# Patient Record
Sex: FEMALE | Race: BLACK OR AFRICAN AMERICAN | NOT HISPANIC OR LATINO | Employment: FULL TIME | URBAN - METROPOLITAN AREA
[De-identification: names, ages, dates, MRNs, and addresses within clinical notes are randomized per-mention and may not be internally consistent; named-entity substitution may affect disease eponyms.]

---

## 2018-03-15 ENCOUNTER — OFFICE VISIT (OUTPATIENT)
Dept: UROLOGY | Facility: CLINIC | Age: 38
End: 2018-03-15
Payer: COMMERCIAL

## 2018-03-15 VITALS
WEIGHT: 210 LBS | DIASTOLIC BLOOD PRESSURE: 76 MMHG | BODY MASS INDEX: 31.83 KG/M2 | SYSTOLIC BLOOD PRESSURE: 117 MMHG | HEART RATE: 69 BPM | HEIGHT: 68 IN

## 2018-03-15 DIAGNOSIS — N39.0 URINARY TRACT INFECTION WITHOUT HEMATURIA, SITE UNSPECIFIED: Primary | ICD-10-CM

## 2018-03-15 LAB
BACTERIA #/AREA URNS AUTO: ABNORMAL /HPF
BILIRUB UR QL STRIP: NEGATIVE
CLARITY UR REFRACT.AUTO: ABNORMAL
COLOR UR AUTO: YELLOW
GLUCOSE UR QL STRIP: NEGATIVE
HGB UR QL STRIP: ABNORMAL
KETONES UR QL STRIP: NEGATIVE
LEUKOCYTE ESTERASE UR QL STRIP: ABNORMAL
MICROSCOPIC COMMENT: ABNORMAL
NITRITE UR QL STRIP: POSITIVE
PH UR STRIP: 5 [PH] (ref 5–8)
PROT UR QL STRIP: NEGATIVE
RBC #/AREA URNS AUTO: 8 /HPF (ref 0–4)
SP GR UR STRIP: 1.02 (ref 1–1.03)
SQUAMOUS #/AREA URNS AUTO: 7 /HPF
URN SPEC COLLECT METH UR: ABNORMAL
UROBILINOGEN UR STRIP-ACNC: NEGATIVE EU/DL
WBC #/AREA URNS AUTO: 25 /HPF (ref 0–5)

## 2018-03-15 PROCEDURE — 81001 URINALYSIS AUTO W/SCOPE: CPT

## 2018-03-15 PROCEDURE — 99999 PR PBB SHADOW E&M-EST. PATIENT-LVL III: CPT | Mod: PBBFAC,,, | Performed by: STUDENT IN AN ORGANIZED HEALTH CARE EDUCATION/TRAINING PROGRAM

## 2018-03-15 PROCEDURE — 87088 URINE BACTERIA CULTURE: CPT

## 2018-03-15 PROCEDURE — 87186 SC STD MICRODIL/AGAR DIL: CPT

## 2018-03-15 PROCEDURE — 87086 URINE CULTURE/COLONY COUNT: CPT

## 2018-03-15 PROCEDURE — 99204 OFFICE O/P NEW MOD 45 MIN: CPT | Mod: S$GLB,,, | Performed by: STUDENT IN AN ORGANIZED HEALTH CARE EDUCATION/TRAINING PROGRAM

## 2018-03-15 PROCEDURE — 87077 CULTURE AEROBIC IDENTIFY: CPT

## 2018-03-15 RX ORDER — NITROFURANTOIN 25; 75 MG/1; MG/1
100 CAPSULE ORAL 2 TIMES DAILY
Qty: 14 CAPSULE | Refills: 0 | Status: SHIPPED | OUTPATIENT
Start: 2018-03-15 | End: 2018-03-22

## 2018-03-15 NOTE — PROGRESS NOTES
"Subjective:       Patient ID: Desire Kilpatrick is a 37 y.o. female.    Chief Complaint: Other (Urine has strong odor.)  This is a 37 y.o.  female patient that is new to me.  The patient is self referred to me for a UTI. She has UTIs about 2x/year since she was about 16 year old per her history. She stated she was given antibiotics twice once in 3/2017 and 8/2017 with her ob/gyn and her urgent care. She is again developing symptoms that make her believe she has another UTI. She is feeling urgency with false alarms, she is going to the bathroom and having small voided volumes only despite strong a urge, she feels like the odor of her urine is "strong". She feels discomfort in her vaginal area and does feel discomfort during sexual activity currently. Denies suprapubic pain, flank pain bilaterally.  She is  to her  for 10 years. She had 3 children, 16, 9, and 12 years old.   Works as an [employer] .      No results found for: CREATININE    ---  Past Medical History:   Diagnosis Date    Migraine headache     STD (sexually transmitted disease)     Urinary tract infection        Past Surgical History:   Procedure Laterality Date    Breast Reduction      TUBAL LIGATION      tummy tuck         No family history on file.    Social History   Substance Use Topics    Smoking status: Never Smoker    Smokeless tobacco: Not on file    Alcohol use No       Current Outpatient Prescriptions on File Prior to Visit   Medication Sig Dispense Refill    [DISCONTINUED] propranolol (INDERAL) 10 MG tablet Take 10 mg by mouth 2 (two) times daily.       No current facility-administered medications on file prior to visit.        Review of patient's allergies indicates:  No Known Allergies    Review of Systems   Constitutional: Negative for activity change.   HENT: Negative for congestion.    Eyes: Negative for visual disturbance.   Respiratory: Negative for shortness of breath.    Cardiovascular: Negative for chest pain. "   Gastrointestinal: Negative for abdominal distention.   Musculoskeletal: Negative for gait problem.   Skin: Negative for color change.   Neurological: Negative for dizziness.   Psychiatric/Behavioral: Negative for agitation.       Objective:      Physical Exam   Constitutional: She is oriented to person, place, and time. She appears well-developed.   HENT:   Head: Normocephalic and atraumatic.   Eyes: EOM are normal.   Neck: Normal range of motion.   Cardiovascular: Intact distal pulses.    Pulmonary/Chest: Effort normal.   Abdominal: Soft. She exhibits no distension. There is no tenderness (no flank pain bilaterally).   Musculoskeletal: Normal range of motion.   Neurological: She is alert and oriented to person, place, and time.   Skin: Skin is warm and dry.   Psychiatric: She has a normal mood and affect.       Assessment:       1. Urinary tract infection without hematuria, site unspecified        Plan:       1. For her UTI will preemptively treat with Macrobid x 7 days while we wait for culture results.  2. Will order urine culture and urinalysis.  3. Since she has been having UTIs twice a year since she was 16, I believe an ultrasound of the kidneys is reasonable to rule out a nidus of infection. I will call her with these results.  4. In the future if she develops symptoms concerning for a UTI, she can call our office and I can order a urine culture and call in antibiotics for her.    Urinary tract infection without hematuria, site unspecified  -     Urine culture  -     Urinalysis  -     Urinalysis Microscopic  -     US Retroperitoneal Complete (Kidney and; Future; Expected date: 03/15/2018    Other orders  -     nitrofurantoin, macrocrystal-monohydrate, (MACROBID) 100 MG capsule; Take 1 capsule (100 mg total) by mouth 2 (two) times daily.  Dispense: 14 capsule; Refill: 0

## 2018-03-17 LAB — BACTERIA UR CULT: NORMAL

## 2018-03-19 ENCOUNTER — TELEPHONE (OUTPATIENT)
Dept: UROLOGY | Facility: CLINIC | Age: 38
End: 2018-03-19

## 2018-03-19 NOTE — TELEPHONE ENCOUNTER
----- Message from Ivory Pinto MD sent at 3/19/2018  7:53 AM CDT -----  Please call patient and notify of positive culture and antibiotics. The urine culture grew out bacteria concerning for a urinary tract infection. The bacteria is sensitive to the Macrobid that I prescribed to her when she came to clinic. She should finish these antibiotics.

## 2018-08-22 ENCOUNTER — ANESTHESIA EVENT (OUTPATIENT)
Dept: SURGERY | Facility: OTHER | Age: 38
End: 2018-08-22
Payer: COMMERCIAL

## 2018-08-22 ENCOUNTER — HOSPITAL ENCOUNTER (OUTPATIENT)
Dept: PREADMISSION TESTING | Facility: OTHER | Age: 38
Discharge: HOME | End: 2018-08-22
Attending: ORTHOPAEDIC SURGERY
Payer: COMMERCIAL

## 2018-08-22 VITALS
HEART RATE: 77 BPM | WEIGHT: 210 LBS | SYSTOLIC BLOOD PRESSURE: 138 MMHG | HEIGHT: 69 IN | OXYGEN SATURATION: 98 % | BODY MASS INDEX: 31.1 KG/M2 | DIASTOLIC BLOOD PRESSURE: 82 MMHG | TEMPERATURE: 98 F

## 2018-08-22 DIAGNOSIS — M22.42 CHONDROMALACIA OF LEFT PATELLA: Primary | ICD-10-CM

## 2018-08-22 RX ORDER — PREGABALIN 75 MG/1
150 CAPSULE ORAL ONCE
Status: DISCONTINUED | OUTPATIENT
Start: 2018-08-22 | End: 2018-08-23 | Stop reason: HOSPADM

## 2018-08-22 RX ORDER — SCOLOPAMINE TRANSDERMAL SYSTEM 1 MG/1
1 PATCH, EXTENDED RELEASE TRANSDERMAL
Status: CANCELLED | OUTPATIENT
Start: 2018-08-22

## 2018-08-22 RX ORDER — ONDANSETRON 2 MG/ML
4 INJECTION INTRAMUSCULAR; INTRAVENOUS DAILY PRN
Status: CANCELLED | OUTPATIENT
Start: 2018-08-22

## 2018-08-22 RX ORDER — MEPERIDINE HYDROCHLORIDE 50 MG/ML
12.5 INJECTION INTRAMUSCULAR; INTRAVENOUS; SUBCUTANEOUS ONCE AS NEEDED
Status: CANCELLED | OUTPATIENT
Start: 2018-08-22 | End: 2018-08-22

## 2018-08-22 RX ORDER — OXYCODONE HYDROCHLORIDE 5 MG/1
5 TABLET ORAL
Status: CANCELLED | OUTPATIENT
Start: 2018-08-22

## 2018-08-22 RX ORDER — SODIUM CHLORIDE 0.9 % (FLUSH) 0.9 %
3 SYRINGE (ML) INJECTION
Status: DISCONTINUED | OUTPATIENT
Start: 2018-08-22 | End: 2018-08-23 | Stop reason: HOSPADM

## 2018-08-22 RX ORDER — FENTANYL CITRATE 50 UG/ML
25 INJECTION, SOLUTION INTRAMUSCULAR; INTRAVENOUS EVERY 5 MIN PRN
Status: CANCELLED | OUTPATIENT
Start: 2018-08-22

## 2018-08-22 RX ORDER — HYDROMORPHONE HYDROCHLORIDE 2 MG/ML
0.4 INJECTION, SOLUTION INTRAMUSCULAR; INTRAVENOUS; SUBCUTANEOUS EVERY 5 MIN PRN
Status: CANCELLED | OUTPATIENT
Start: 2018-08-22

## 2018-08-22 RX ORDER — SODIUM CHLORIDE, SODIUM LACTATE, POTASSIUM CHLORIDE, CALCIUM CHLORIDE 600; 310; 30; 20 MG/100ML; MG/100ML; MG/100ML; MG/100ML
INJECTION, SOLUTION INTRAVENOUS CONTINUOUS
Status: CANCELLED | OUTPATIENT
Start: 2018-08-22

## 2018-08-22 NOTE — ANESTHESIA PREPROCEDURE EVALUATION
08/22/2018  Desire Kilpatrick is a 38 y.o., female.    Anesthesia Evaluation    I have reviewed the Patient Summary Reports.    I have reviewed the Nursing Notes.   I have reviewed the Medications.     Review of Systems  Anesthesia Hx:  No problems with previous Anesthesia  Denies Family Hx of Anesthesia complications.  Personal Hx of Anesthesia complications, Post-Operative Nausea/Vomiting   Social:  Non-Smoker    Hematology/Oncology:  Hematology Normal   Oncology Normal     EENT/Dental:EENT/Dental Normal   Cardiovascular:  Cardiovascular Normal Exercise tolerance: good     Pulmonary:  Pulmonary Normal    Renal/:  Renal/ Normal     Musculoskeletal:  Musculoskeletal Normal    Neurological:   Headaches    Endocrine:  Endocrine Normal    Dermatological:  Skin Normal    Psych:  Psychiatric Normal           Physical Exam  General:  Well nourished    Airway/Jaw/Neck:  Airway Findings: Mouth Opening: Normal Tongue: Normal  General Airway Assessment: Adult  Mallampati: I  TM Distance: Normal, at least 6 cm  Jaw/Neck Findings:  Neck ROM: Normal ROM      Dental:  Dental Findings: In tact             Anesthesia Plan  Type of Anesthesia, risks & benefits discussed:  Anesthesia Type:  general  Patient's Preference:   Intra-op Monitoring Plan:   Intra-op Monitoring Plan Comments:   Post Op Pain Control Plan: per primary service following discharge from PACU  Post Op Pain Control Plan Comments:   Induction:   IV  Beta Blocker:         Informed Consent: Patient understands risks and agrees with Anesthesia plan.  Questions answered.   ASA Score: 2     Day of Surgery Review of History & Physical:    H&P update referred to the surgeon.         Ready For Surgery From Anesthesia Perspective.

## 2018-08-22 NOTE — DISCHARGE INSTRUCTIONS
PRE-ADMIT TESTING -  [phone]    [address]  [place] [facility]          Your surgery has been scheduled at [facility]. We are pleased to have the opportunity to serve you. For Further Information please call [phone].    On the day of surgery please report to the Information Desk on the 1st floor.    · CONTACT YOUR PHYSICIAN'S OFFICE THE DAY PRIOR TO YOUR SURGERY TO OBTAIN YOUR ARRIVAL TIME.     · The evening before surgery do not eat anything after 9 p.m. ( this includes hard candy, chewing gum and mints).  You may only have GATORADE, POWERADE AND WATER  from 9 p.m. until you leave your home.   DO NOT DRINK ANY LIQUIDS ON THE WAY TO THE HOSPITAL.      SPECIAL MEDICATION INSTRUCTIONS: TAKE medications checked off by the Anesthesiologist on your Medication List.    Angiogram Patients: Take medications as instructed by your physician, including aspirin.     Surgery Patients:    If you take ASPIRIN - Your PHYSICIAN/SURGEON will need to inform you IF/OR when you need to stop taking aspirin prior to your surgery.     Do Not take any medications containing IBUPROFEN.  Do Not Wear any make-up or dark nail polish   (especially eye make-up) to surgery. If you come to surgery with makeup on you will be required to remove the makeup or nail polish.    Do not shave your surgical area at least 5 days prior to your surgery. The surgical prep will be performed at the hospital according to Infection Control regulations.    Leave all valuables at home.   Do Not wear any jewelry or watches, including any metal in body piercings.  Contact Lens must be removed before surgery. Either do not wear the contact lens or bring a case and solution for storage.  Please bring a container for eyeglasses or dentures as required.  Bring any paperwork your physician has provided, such as consent forms,  history and physicals, doctor's orders, etc.   Bring comfortable clothes that are loose fitting to wear upon  discharge. Take into consideration the type of surgery being performed.  Maintain your diet as advised per your physician the day prior to surgery.      Adequate rest the night before surgery is advised.   Park in the Parking lot behind the hospital or in the [facility] across the street from the parking lot. Parking is complimentary.  If you will be discharged the same day as your procedure, please arrange for a responsible adult to drive you home or to accompany you if traveling by taxi.   YOU WILL NOT BE PERMITTED TO DRIVE OR TO LEAVE THE HOSPITAL ALONE AFTER SURGERY.   It is strongly recommended that you arrange for someone to remain with you for the first 24 hrs following your surgery.       Thank you for your cooperation.  The Staff of [facility].        Bathing Instructions                                                                 Please shower the evening before and morning of your procedure with    ANTIBACTERIAL SOAP. ( DIAL, etc )  Concentrate on the surgical area   for at least 3 minutes and rinse completely. Dry off as usual.   Do not use any deodorant, powder, body lotions, perfume, after shave or    cologne.

## 2018-08-24 ENCOUNTER — HOSPITAL ENCOUNTER (OUTPATIENT)
Facility: OTHER | Age: 38
Discharge: HOME | End: 2018-08-24
Attending: ORTHOPAEDIC SURGERY | Admitting: ORTHOPAEDIC SURGERY
Payer: COMMERCIAL

## 2018-08-24 ENCOUNTER — ANESTHESIA (OUTPATIENT)
Dept: SURGERY | Facility: OTHER | Age: 38
End: 2018-08-24
Payer: COMMERCIAL

## 2018-08-24 VITALS
SYSTOLIC BLOOD PRESSURE: 136 MMHG | OXYGEN SATURATION: 99 % | WEIGHT: 210 LBS | HEART RATE: 94 BPM | RESPIRATION RATE: 16 BRPM | HEIGHT: 69 IN | TEMPERATURE: 98 F | BODY MASS INDEX: 31.1 KG/M2 | DIASTOLIC BLOOD PRESSURE: 82 MMHG

## 2018-08-24 DIAGNOSIS — M22.42 CHONDROMALACIA OF LEFT PATELLA: ICD-10-CM

## 2018-08-24 LAB
B-HCG UR QL: NEGATIVE
CTP QC/QA: YES

## 2018-08-24 PROCEDURE — C1729 CATH, DRAINAGE: HCPCS | Performed by: ORTHOPAEDIC SURGERY

## 2018-08-24 PROCEDURE — 25000003 PHARM REV CODE 250: Performed by: ANESTHESIOLOGY

## 2018-08-24 PROCEDURE — 71000015 HC POSTOP RECOV 1ST HR: Performed by: ORTHOPAEDIC SURGERY

## 2018-08-24 PROCEDURE — 63600175 PHARM REV CODE 636 W HCPCS: Performed by: NURSE ANESTHETIST, CERTIFIED REGISTERED

## 2018-08-24 PROCEDURE — 37000009 HC ANESTHESIA EA ADD 15 MINS: Performed by: ORTHOPAEDIC SURGERY

## 2018-08-24 PROCEDURE — 27201423 OPTIME MED/SURG SUP & DEVICES STERILE SUPPLY: Performed by: ORTHOPAEDIC SURGERY

## 2018-08-24 PROCEDURE — 71000016 HC POSTOP RECOV ADDL HR: Performed by: ORTHOPAEDIC SURGERY

## 2018-08-24 PROCEDURE — 36000710: Performed by: ORTHOPAEDIC SURGERY

## 2018-08-24 PROCEDURE — 25000003 PHARM REV CODE 250: Performed by: NURSE ANESTHETIST, CERTIFIED REGISTERED

## 2018-08-24 PROCEDURE — 63600175 PHARM REV CODE 636 W HCPCS: Performed by: ORTHOPAEDIC SURGERY

## 2018-08-24 PROCEDURE — 25000003 PHARM REV CODE 250: Performed by: ORTHOPAEDIC SURGERY

## 2018-08-24 PROCEDURE — 81025 URINE PREGNANCY TEST: CPT | Performed by: ANESTHESIOLOGY

## 2018-08-24 PROCEDURE — 25000003 PHARM REV CODE 250: Performed by: SPECIALIST

## 2018-08-24 PROCEDURE — 37000008 HC ANESTHESIA 1ST 15 MINUTES: Performed by: ORTHOPAEDIC SURGERY

## 2018-08-24 PROCEDURE — 36000711: Performed by: ORTHOPAEDIC SURGERY

## 2018-08-24 PROCEDURE — 63600175 PHARM REV CODE 636 W HCPCS: Performed by: SPECIALIST

## 2018-08-24 PROCEDURE — 71000033 HC RECOVERY, INTIAL HOUR: Performed by: ORTHOPAEDIC SURGERY

## 2018-08-24 RX ORDER — MEPERIDINE HYDROCHLORIDE 50 MG/ML
12.5 INJECTION INTRAMUSCULAR; INTRAVENOUS; SUBCUTANEOUS ONCE AS NEEDED
Status: COMPLETED | OUTPATIENT
Start: 2018-08-24 | End: 2018-08-24

## 2018-08-24 RX ORDER — ONDANSETRON 4 MG/1
8 TABLET, ORALLY DISINTEGRATING ORAL EVERY 8 HOURS PRN
Qty: 10 TABLET | Refills: 1 | Status: SHIPPED | OUTPATIENT
Start: 2018-08-24 | End: 2019-03-22

## 2018-08-24 RX ORDER — FENTANYL CITRATE 50 UG/ML
25 INJECTION, SOLUTION INTRAMUSCULAR; INTRAVENOUS EVERY 5 MIN PRN
Status: DISCONTINUED | OUTPATIENT
Start: 2018-08-24 | End: 2018-08-24 | Stop reason: HOSPADM

## 2018-08-24 RX ORDER — OXYCODONE HYDROCHLORIDE 5 MG/1
5 TABLET ORAL ONCE
Status: COMPLETED | OUTPATIENT
Start: 2018-08-24 | End: 2018-08-24

## 2018-08-24 RX ORDER — HYDROCODONE BITARTRATE AND ACETAMINOPHEN 5; 325 MG/1; MG/1
1 TABLET ORAL EVERY 4 HOURS PRN
Status: DISCONTINUED | OUTPATIENT
Start: 2018-08-24 | End: 2018-08-24 | Stop reason: HOSPADM

## 2018-08-24 RX ORDER — MEPERIDINE HYDROCHLORIDE 50 MG/ML
12.5 INJECTION INTRAMUSCULAR; INTRAVENOUS; SUBCUTANEOUS ONCE AS NEEDED
Status: DISCONTINUED | OUTPATIENT
Start: 2018-08-24 | End: 2018-08-24 | Stop reason: HOSPADM

## 2018-08-24 RX ORDER — HYDROMORPHONE HYDROCHLORIDE 2 MG/ML
0.4 INJECTION, SOLUTION INTRAMUSCULAR; INTRAVENOUS; SUBCUTANEOUS EVERY 5 MIN PRN
Status: DISCONTINUED | OUTPATIENT
Start: 2018-08-24 | End: 2018-08-24 | Stop reason: HOSPADM

## 2018-08-24 RX ORDER — ACETAMINOPHEN 10 MG/ML
INJECTION, SOLUTION INTRAVENOUS
Status: DISCONTINUED | OUTPATIENT
Start: 2018-08-24 | End: 2018-08-24

## 2018-08-24 RX ORDER — GLYCOPYRROLATE 0.2 MG/ML
INJECTION INTRAMUSCULAR; INTRAVENOUS
Status: DISCONTINUED | OUTPATIENT
Start: 2018-08-24 | End: 2018-08-24

## 2018-08-24 RX ORDER — SODIUM CHLORIDE, SODIUM LACTATE, POTASSIUM CHLORIDE, CALCIUM CHLORIDE 600; 310; 30; 20 MG/100ML; MG/100ML; MG/100ML; MG/100ML
INJECTION, SOLUTION INTRAVENOUS CONTINUOUS
Status: DISCONTINUED | OUTPATIENT
Start: 2018-08-24 | End: 2018-08-24 | Stop reason: HOSPADM

## 2018-08-24 RX ORDER — OXYCODONE HYDROCHLORIDE 5 MG/1
10 TABLET ORAL EVERY 4 HOURS PRN
Status: DISCONTINUED | OUTPATIENT
Start: 2018-08-24 | End: 2018-08-24 | Stop reason: HOSPADM

## 2018-08-24 RX ORDER — DIPHENHYDRAMINE HYDROCHLORIDE 50 MG/ML
25 INJECTION INTRAMUSCULAR; INTRAVENOUS EVERY 6 HOURS PRN
Status: DISCONTINUED | OUTPATIENT
Start: 2018-08-24 | End: 2018-08-24 | Stop reason: HOSPADM

## 2018-08-24 RX ORDER — BUPIVACAINE HYDROCHLORIDE 2.5 MG/ML
INJECTION, SOLUTION EPIDURAL; INFILTRATION; INTRACAUDAL
Status: DISCONTINUED | OUTPATIENT
Start: 2018-08-24 | End: 2018-08-24 | Stop reason: HOSPADM

## 2018-08-24 RX ORDER — HYDROCODONE BITARTRATE AND ACETAMINOPHEN 10; 325 MG/1; MG/1
1 TABLET ORAL
Qty: 20 TABLET | Refills: 0 | Status: SHIPPED | OUTPATIENT
Start: 2018-08-24 | End: 2019-03-22

## 2018-08-24 RX ORDER — ONDANSETRON 2 MG/ML
4 INJECTION INTRAMUSCULAR; INTRAVENOUS DAILY PRN
Status: DISCONTINUED | OUTPATIENT
Start: 2018-08-24 | End: 2018-08-24 | Stop reason: HOSPADM

## 2018-08-24 RX ORDER — FENTANYL CITRATE 50 UG/ML
25 INJECTION, SOLUTION INTRAMUSCULAR; INTRAVENOUS EVERY 5 MIN PRN
Status: COMPLETED | OUTPATIENT
Start: 2018-08-24 | End: 2018-08-24

## 2018-08-24 RX ORDER — EPINEPHRINE 1 MG/ML
INJECTION, SOLUTION INTRACARDIAC; INTRAMUSCULAR; INTRAVENOUS; SUBCUTANEOUS
Status: DISCONTINUED | OUTPATIENT
Start: 2018-08-24 | End: 2018-08-24 | Stop reason: HOSPADM

## 2018-08-24 RX ORDER — SODIUM CHLORIDE 0.9 % (FLUSH) 0.9 %
3 SYRINGE (ML) INJECTION
Status: DISCONTINUED | OUTPATIENT
Start: 2018-08-24 | End: 2018-08-24 | Stop reason: HOSPADM

## 2018-08-24 RX ORDER — OXYCODONE HYDROCHLORIDE 5 MG/1
5 TABLET ORAL
Status: DISCONTINUED | OUTPATIENT
Start: 2018-08-24 | End: 2018-08-24 | Stop reason: HOSPADM

## 2018-08-24 RX ORDER — SCOLOPAMINE TRANSDERMAL SYSTEM 1 MG/1
1 PATCH, EXTENDED RELEASE TRANSDERMAL
Status: DISCONTINUED | OUTPATIENT
Start: 2018-08-24 | End: 2018-08-24 | Stop reason: HOSPADM

## 2018-08-24 RX ORDER — PROPOFOL 10 MG/ML
VIAL (ML) INTRAVENOUS
Status: DISCONTINUED | OUTPATIENT
Start: 2018-08-24 | End: 2018-08-24

## 2018-08-24 RX ORDER — DEXAMETHASONE SODIUM PHOSPHATE 4 MG/ML
INJECTION, SOLUTION INTRA-ARTICULAR; INTRALESIONAL; INTRAMUSCULAR; INTRAVENOUS; SOFT TISSUE
Status: DISCONTINUED | OUTPATIENT
Start: 2018-08-24 | End: 2018-08-24

## 2018-08-24 RX ORDER — ONDANSETRON 2 MG/ML
4 INJECTION INTRAMUSCULAR; INTRAVENOUS EVERY 12 HOURS PRN
Status: DISCONTINUED | OUTPATIENT
Start: 2018-08-24 | End: 2018-08-24 | Stop reason: HOSPADM

## 2018-08-24 RX ORDER — LIDOCAINE HCL/PF 100 MG/5ML
SYRINGE (ML) INTRAVENOUS
Status: DISCONTINUED | OUTPATIENT
Start: 2018-08-24 | End: 2018-08-24

## 2018-08-24 RX ORDER — FENTANYL CITRATE 50 UG/ML
INJECTION, SOLUTION INTRAMUSCULAR; INTRAVENOUS
Status: DISCONTINUED | OUTPATIENT
Start: 2018-08-24 | End: 2018-08-24

## 2018-08-24 RX ORDER — CEFAZOLIN SODIUM 2 G/50ML
2 SOLUTION INTRAVENOUS
Status: COMPLETED | OUTPATIENT
Start: 2018-08-24 | End: 2018-08-24

## 2018-08-24 RX ADMIN — PROPOFOL 200 MG: 10 INJECTION, EMULSION INTRAVENOUS at 06:08

## 2018-08-24 RX ADMIN — OXYCODONE HYDROCHLORIDE 5 MG: 5 TABLET ORAL at 09:08

## 2018-08-24 RX ADMIN — ACETAMINOPHEN 1000 MG: 10 INJECTION, SOLUTION INTRAVENOUS at 07:08

## 2018-08-24 RX ADMIN — DEXAMETHASONE SODIUM PHOSPHATE 8 MG: 4 INJECTION, SOLUTION INTRAMUSCULAR; INTRAVENOUS at 07:08

## 2018-08-24 RX ADMIN — OXYCODONE HYDROCHLORIDE 5 MG: 5 TABLET ORAL at 08:08

## 2018-08-24 RX ADMIN — CARBOXYMETHYLCELLULOSE SODIUM 2 DROP: 2.5 SOLUTION/ DROPS OPHTHALMIC at 07:08

## 2018-08-24 RX ADMIN — FENTANYL CITRATE 100 MCG: 50 INJECTION, SOLUTION INTRAMUSCULAR; INTRAVENOUS at 06:08

## 2018-08-24 RX ADMIN — GLYCOPYRROLATE 0.4 MG: 0.2 INJECTION, SOLUTION INTRAMUSCULAR; INTRAVENOUS at 07:08

## 2018-08-24 RX ADMIN — FENTANYL CITRATE 25 MCG: 50 INJECTION, SOLUTION INTRAMUSCULAR; INTRAVENOUS at 08:08

## 2018-08-24 RX ADMIN — FENTANYL CITRATE 25 MCG: 50 INJECTION, SOLUTION INTRAMUSCULAR; INTRAVENOUS at 07:08

## 2018-08-24 RX ADMIN — SODIUM CHLORIDE, SODIUM LACTATE, POTASSIUM CHLORIDE, AND CALCIUM CHLORIDE: 600; 310; 30; 20 INJECTION, SOLUTION INTRAVENOUS at 06:08

## 2018-08-24 RX ADMIN — MEPERIDINE HYDROCHLORIDE 12.5 MG: 50 INJECTION INTRAMUSCULAR; INTRAVENOUS; SUBCUTANEOUS at 07:08

## 2018-08-24 RX ADMIN — LIDOCAINE HYDROCHLORIDE 50 MG: 20 INJECTION, SOLUTION INTRAVENOUS at 06:08

## 2018-08-24 RX ADMIN — CEFAZOLIN SODIUM 2 G: 2 SOLUTION INTRAVENOUS at 07:08

## 2018-08-24 RX ADMIN — SCOPOLAMINE 1 PATCH: 1 PATCH, EXTENDED RELEASE TRANSDERMAL at 06:08

## 2018-08-24 NOTE — OP NOTE
DATE OF PROCEDURE:  08/24/2018.    PREOPERATIVE DIAGNOSES:  1.  Left knee patellofemoral chondromalacia.  2.  Left knee patellofemoral maltracking.    POSTOPERATIVE DIAGNOSES:  1.  Left knee patellofemoral chondromalacia.  2.  Left knee patellofemoral maltracking.    PROCEDURES PERFORMED:  1.  Left knee arthroscopic lateral release.  2.  Left knee arthroscopic patellofemoral chondroplasty.  3.  Left knee arthroscopic partial synovectomy/hypertrophic fat pad recession.    SURGEON:  Konrad Mckeon M.D.    ASSISTANT:  Myriam Dove CST.    COMPLICATIONS:  None.    SPECIMENS:  None.    ESTIMATED BLOOD LOSS:  3 mL    TOURNIQUET TIME:  Approximately 25 minutes at 300 mmHg.    POSTOPERATIVE PLAN:  The patient will follow up arthroscopic lateral release   protocol.    ANESTHESIA:  General endotracheal anesthesia plus local.    HISTORY:  Desire Kilpatrick is a 38-year-old female with chronic bilateral anterior   knee/patellofemoral pain.  Preoperative workup revealed lateral tracking with   lateral tilt as well as some degenerative changes predominantly involving the   lateral facet of the patella as well as the trochlear groove.  I had long   discussion with her about options including osteotomy with distal realignment   etc.  After a long discussion, she opted for the above treatment.  All risks and   benefits were discussed at length and informed consent was obtained for the   above procedure.    PROCEDURE IN DETAIL:  Desire Kilpatrick was taken to the Operating Room on   08/24/2018 for planned left knee arthroscopy.  She was given 2 g of Ancef   preoperatively and taken to the Operating Room and placed in the supine   position.  General endotracheal anesthesia was administered.  A nonsterile   tourniquet was placed and the foot of the bed was dropped.  The left knee was   prepped and draped in the usual sterile fashion.  A time-out procedure was   performed identifying the left knee as the surgical site.  An Esmarch  was then   used to exsanguinate the limb and after the timeout was performed, a standard   vertical anterolateral portal was established.  This was then followed by an   anteromedial portal established under direct visualization with spinal needle   localization.  Diagnostic arthroscopy then proceeded.  The medial compartment   was unremarkable for meniscal or cartilage injury.  Lateral compartment was also   unremarkable for meniscal or chondral injury.  Notch view revealed intact ACL   and PCL.  The ligamentum mucosum was excised.  The medial and lateral gutters   and the suprapatellar pouch were also unremarkable.  Patellofemoral articulation   revealed lateral tilt of the patella with a mild lateral patellar subluxation.    There was also hypertrophic fat pad noted and there was diffuse grade III   chondral damage to the lateral facet with lesser grade II changes to the median   ridge.  The medial facet was unremarkable.  The trochlear groove had diffuse   chondral damage to the groove as well as the lateral and medial aspects.  This   was a grade II/III.  There were some unstable cartilage edges to the patella as   well as the trochlear groove and a chondroplasty was performed just to remove   any unstable edges, but no significant or aggressive cartilage debridement was   performed.  Next viewing from the anteromedial portal, a lateral release was   performed with the hook-shaped Bovie from the tip of the vastus lateralis down   along the lateral retinaculum.  Dynamic examination was performed throughout   confirming central tracking, which after the release was performed.  The excess   fluid was then removed from the knee and the tracking was assessed and was found   to be much more central.  There was a significantly hypertrophic fat pad, which   was impinging within the patellofemoral articulation, so a combination of the   oscillating shaver as well as electrocautery device were used to recess this   back,  so there was no visible impingement.  Next, the fluid pressure was reduced   and any bleeding was controlled with electrocautery device.  A drain was then   placed through the anterolateral portal out the lateral thigh.  The portals were   then closed with 3-0 Prolene suture.  The portal sites were injected with a   total of 10 mL of 0.25% Marcaine and a soft dressing was applied followed by a   PolarCare unit.  The patient was then extubated in the Operating Room and taken   to the PACU without complication.      SCOTT  dd: 08/24/2018 07:52:05 (CDT)  td: 08/24/2018 08:08:09 (CDT)  Doc ID   #[id number]  Job ID #[id number]    CC:

## 2018-08-24 NOTE — DISCHARGE INSTRUCTIONS
Anesthesia: After Your Surgery  Youve just had surgery. During surgery, you received medication called anesthesia to keep you comfortable and pain-free. After surgery, you may experience some pain or nausea. This is common. Here are some tips for feeling better and recovering after surgery.    Going home  Your doctor or nurse will show you how to take care of yourself when you go home. He or she will also answer your questions. Have an adult family member or friend drive you home. For the first 24 hours after your surgery:  · Do not drive or use heavy equipment.  · Do not make important decisions or sign legal documents.  · Avoid alcohol.  · Have someone stay with you, if needed. He or she can watch for problems and help keep you safe.  Be sure to keep all follow-up appointments with your doctor. And rest after your procedure for as long as your doctor tells you to.    Please follow any additional instructions given by Dr. Mckeon.  Coping with pain  If you have pain after surgery, pain medication will help you feel better. Take it as directed, before pain becomes severe. Also, ask your doctor or pharmacist about other ways to control pain, such as with heat, ice, and relaxation. And follow any other instructions your surgeon or nurse gives you.    Tips for taking pain medication  To get the best relief possible, remember these points:  · Pain medications can upset your stomach. Taking them with a little food may help.  · Most pain relievers taken by mouth need at least 20 to 30 minutes to take effect.  · Taking medication on a schedule can help you remember to take it. Try to time your medication so that you can take it before beginning an activity, such as dressing, walking, or sitting down for dinner.  · Constipation is a common side effect of pain medications. Contact your doctor before taking any medications like laxatives or stool softeners to help relieve constipation. Also ask about any dietary  restrictions, because drinking lots of fluids and eating foods like fruits and vegetables that are high in fiber can also help. Remember, dont take laxatives unless your surgeon has prescribed them.  · Mixing alcohol and pain medication can cause dizziness and slow your breathing. It can even be fatal. Dont drink alcohol while taking pain medication.  · Pain medication can slow your reflexes. Dont drive or operate machinery while taking pain medication.  If your health care provider tells you to take acetaminophen to help relieve your pain, ask him or her how much you are supposed to take each day. (Acetaminophen is the generic name for Tylenol and other brand-name pain relievers.) Acetaminophen or other pain relievers may interact with your prescription medicines or other over-the-counter (OTC) drugs. Some prescription medications contain acetaminophen along with other active ingredients. Using both prescription and OTC acetaminophen for pain can cause you to overdose. The FDA recommends that you read the labels on your OTC medications carefully. This will help you to clearly understand the list of active ingredients, dosing instructions, and any warnings. It may also help you avoid taking too much acetaminophen. If you have questions or don't understand the information, ask your pharmacist or health care provider to explain it to you before you take the OTC medication.    Managing nausea  Some people have an upset stomach after surgery. This is often due to anesthesia, pain, pain medications, or the stress of surgery. The following tips will help you manage nausea and get good nutrition as you recover. If you were on a special diet before surgery, ask your doctor if you should follow it during recovery. These tips may help:  · Dont push yourself to eat. Your body will tell you when to eat and how much.  · Start off with clear liquids and soup. They are easier to digest.  · Progress to semi-solid foods (mashed  potatoes, applesauce, and gelatin) as you feel ready.  · Slowly move to solid foods. Dont eat fatty, rich, or spicy foods at first.  · Dont force yourself to have three large meals a day. Instead, eat smaller amounts more often.  · Take pain medications with a small amount of solid food, such as crackers or toast to avoid nausea.      Call your surgeon if  · You still have pain an hour after taking medication (it may not be strong enough).  · You feel too sleepy, dizzy, or groggy (medication may be too strong).  · You have side effects like nausea, vomiting, or skin changes (rash, itching, or hives).   © 4078-1760 [employer]. [address] [place] [address], [place] [address]. All rights reserved. This information is not intended as a substitute for professional medical care. Always follow your healthcare professional's instructions.                      KNEE ARTHROSCOPY       POST OPERATIVE INSTRUCTIONS        DR. Paulino    1. ICE - apply ice to the affected knee for thirty minutes, 4 or 5 times for the first few days, and then as needed to control swelling.     2.  EXERCISES -  Perform straight leg raise exercises to strengthen the quad by holding the knee out straight and lifting the leg up to a 45 degree angle and holding for a count of five. Do forty straight leg raise exercises twice a day, starting as soon as possible after surgery. It is ok and advisable to start moving the knee through range of motion as soon as possible.      3.  CRUTCHES - walk with crutches, weight bearing as tolerated. The crutches can be discontinued when pain allows full weight bearing. Normally, this takes 2-5 days.     4.  DRESSING - remove the ace bandage, padding and Band-aids 3-4 days following surgery. Reapply new Band-aids and re-wrap with a new ace wrap.      5.  BATHING - Do not get the knee wet until seen at your post-op visit.     6.  APPOINTMENT - call [phone] to make an appointment for suture removal 10-14 days  after surgery.

## 2018-08-24 NOTE — PLAN OF CARE
Desire Kilpatrick has met all discharge criteria from Phase II. Vital Signs are stable, ambulating  without difficulty.Pain is now under control and tolerable for the pt. Pain score 4 at this time.  Discharge instructions given, patient verbalized understanding. Discharged from facility via wheelchair in stable condition.

## 2018-08-24 NOTE — OR NURSING
Pt measured and given crutches. Pt education on the use of crutches for relief of weight bearing mgmt, acknowledged and return demonstrated all education received.

## 2018-08-24 NOTE — TRANSFER OF CARE
"Anesthesia Transfer of Care Note    Patient: Desire Kilpatrick    Procedure(s) Performed: Procedure(s) (LRB):  ARTHROSCOPY, KNEE (Left)  ARTHROSCOPY, KNEE, WITH CHONDROPLASTY (Left)  RELEASE, KNEE, LATERAL RETINACULA (Left)    Patient location: PACU    Anesthesia Type: general    Transport from OR: Transported from OR on 2-3 L/min O2 by NC with adequate spontaneous ventilation    Post pain: adequate analgesia    Post assessment: no apparent anesthetic complications    Post vital signs: stable    Level of consciousness: awake, alert and oriented    Nausea/Vomiting: no nausea/vomiting    Complications: none    Transfer of care protocol was followed      Last vitals:   Visit Vitals  /81 (BP Location: Left arm, Patient Position: Sitting)   Pulse 79   Temp 37.1 °C (98.7 °F) (Oral)   Resp 16   Ht 5' 8.5" (1.74 m)   Wt 95.3 kg (209 lb 16 oz)   LMP 07/24/2018   SpO2 97%   Breastfeeding? No   BMI 31.47 kg/m²     "

## 2018-08-24 NOTE — ANESTHESIA POSTPROCEDURE EVALUATION
"Anesthesia Post Evaluation    Patient: Desire Kilpatrick    Procedure(s) Performed: Procedure(s) (LRB):  ARTHROSCOPY, KNEE (Left)  ARTHROSCOPY, KNEE, WITH CHONDROPLASTY (Left)  RELEASE, KNEE, LATERAL RETINACULA (Left)    Final Anesthesia Type: general  Patient location during evaluation: PACU  Patient participation: Yes- Able to Participate  Level of consciousness: awake and alert  Post-procedure vital signs: reviewed and stable  Pain management: adequate  Airway patency: patent  PONV status at discharge: No PONV  Anesthetic complications: no      Cardiovascular status: blood pressure returned to baseline  Respiratory status: unassisted, spontaneous ventilation and room air  Hydration status: euvolemic  Follow-up not needed.        Visit Vitals  /83 (BP Location: Left arm, Patient Position: Lying)   Pulse 78   Temp 36.7 °C (98 °F) (Oral)   Resp 16   Ht 5' 8.5" (1.74 m)   Wt 95.3 kg (209 lb 16 oz)   LMP 07/24/2018   SpO2 98%   Breastfeeding? No   BMI 31.47 kg/m²       Pain/Kinjal Score: Pain Assessment Performed: Yes (8/24/2018  9:15 AM)  Presence of Pain: complains of pain/discomfort (8/24/2018  9:15 AM)  Pain Rating Prior to Med Admin: 6 (8/24/2018  9:37 AM)  Pain Rating Post Med Admin: 3 (8/24/2018  8:29 AM)  Kinjal Score: 10 (8/24/2018  9:15 AM)        "

## 2018-08-24 NOTE — BRIEF OP NOTE
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]     * Closed traumatic lateral subluxation of left patellofemoral joint, initial encounter [S83.012A]    Procedure(s) (LRB):  ARTHROSCOPY, KNEE (Left)  ARTHROSCOPY, KNEE, WITH CHONDROPLASTY (Left)  RELEASE, KNEE, LATERAL RETINACULA (Left)    Anesthesia: General + Local    Description of the findings of the procedure: PF OA, lateral tilt/tracking    Findings/Key Components: see above    Estimated Blood Loss: 3cc         Specimens:   Specimen (12h ago, onward)    None          Discharge Note    SUMMARY     Admit Date: 8/24/2018    Discharge Date and Time: 8/24/18    Hospital Course (synopsis of major diagnoses, care, treatment, and services provided during the course of the hospital stay): outpt    Final Diagnosis: Post-Op Diagnosis Codes:     * 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     ondansetron 4 MG Tbdl  Commonly known as:  ZOFRAN-ODT  Take 2 tablets (8 mg total) by mouth every 8 (eight) hours as needed.          Discharge Procedure Orders   CRUTCHES FOR HOME USE     Order Specific Question Answer Comments   Type: Axillary    Height: 5' 8.5" (1.74 m)    Weight: 95.3 kg (209 lb 16 oz)  "   Length of need (1-99 months): 2      Diet general     Call MD for:  temperature >100.4     Call MD for:  persistent nausea and vomiting     Call MD for:  severe uncontrolled pain     Call MD for:  difficulty breathing, headache or visual disturbances     Call MD for:  redness, tenderness, or signs of infection (pain, swelling, redness, odor or green/yellow discharge around incision site)     Call MD for:  hives     Call MD for:  persistent dizziness or light-headedness     Call MD for:  extreme fatigue     Ice to affected area     Remove dressing in 48 hours   Order Comments: Then change daily. Keep clean, dry and covered     Weight bearing restrictions (specify)   Order Comments: Partial WB with crutches until pain subsides.     Follow-up Information     Konrad Kumar MD. Schedule an appointment as soon as possible for a visit in 10 days.    Specialty:  Orthopedic Surgery  Why:  For suture removal, For wound re-check  Contact information:  [address] [facility] [address] [phone]

## 2019-03-22 ENCOUNTER — OFFICE VISIT (OUTPATIENT)
Dept: OBSTETRICS AND GYNECOLOGY | Facility: CLINIC | Age: 39
End: 2019-03-22
Payer: COMMERCIAL

## 2019-03-22 VITALS
DIASTOLIC BLOOD PRESSURE: 80 MMHG | WEIGHT: 225.5 LBS | HEIGHT: 69 IN | BODY MASS INDEX: 33.4 KG/M2 | SYSTOLIC BLOOD PRESSURE: 136 MMHG

## 2019-03-22 DIAGNOSIS — Z12.4 SCREENING FOR CERVICAL CANCER: ICD-10-CM

## 2019-03-22 DIAGNOSIS — Z11.3 SCREENING FOR STD (SEXUALLY TRANSMITTED DISEASE): ICD-10-CM

## 2019-03-22 DIAGNOSIS — R82.90 ABNORMAL URINE ODOR: ICD-10-CM

## 2019-03-22 DIAGNOSIS — R10.2 PELVIC PAIN IN FEMALE: ICD-10-CM

## 2019-03-22 DIAGNOSIS — N89.8 VAGINAL DISCHARGE: ICD-10-CM

## 2019-03-22 DIAGNOSIS — Z01.419 WELL WOMAN EXAM WITH ROUTINE GYNECOLOGICAL EXAM: Primary | ICD-10-CM

## 2019-03-22 PROCEDURE — 99999 PR PBB SHADOW E&M-EST. PATIENT-LVL III: ICD-10-PCS | Mod: PBBFAC,,, | Performed by: OBSTETRICS & GYNECOLOGY

## 2019-03-22 PROCEDURE — 87088 URINE BACTERIA CULTURE: CPT

## 2019-03-22 PROCEDURE — 99999 PR PBB SHADOW E&M-EST. PATIENT-LVL III: CPT | Mod: PBBFAC,,, | Performed by: OBSTETRICS & GYNECOLOGY

## 2019-03-22 PROCEDURE — 87186 SC STD MICRODIL/AGAR DIL: CPT

## 2019-03-22 PROCEDURE — 87480 CANDIDA DNA DIR PROBE: CPT

## 2019-03-22 PROCEDURE — 87077 CULTURE AEROBIC IDENTIFY: CPT

## 2019-03-22 PROCEDURE — 88175 CYTOPATH C/V AUTO FLUID REDO: CPT

## 2019-03-22 PROCEDURE — 99385 PR PREVENTIVE VISIT,NEW,18-39: ICD-10-PCS | Mod: S$GLB,,, | Performed by: OBSTETRICS & GYNECOLOGY

## 2019-03-22 PROCEDURE — 87624 HPV HI-RISK TYP POOLED RSLT: CPT

## 2019-03-22 PROCEDURE — 99385 PREV VISIT NEW AGE 18-39: CPT | Mod: S$GLB,,, | Performed by: OBSTETRICS & GYNECOLOGY

## 2019-03-22 PROCEDURE — 87086 URINE CULTURE/COLONY COUNT: CPT

## 2019-03-22 PROCEDURE — 87491 CHLMYD TRACH DNA AMP PROBE: CPT

## 2019-03-22 RX ORDER — NITROFURANTOIN 25; 75 MG/1; MG/1
100 CAPSULE ORAL 2 TIMES DAILY
Qty: 7 CAPSULE | Refills: 14 | Status: SHIPPED | OUTPATIENT
Start: 2019-03-22 | End: 2019-11-25 | Stop reason: CLARIF

## 2019-03-22 NOTE — PROGRESS NOTES
GYNECOLOGY OFFICE NOTE    Reason for visit: annual    HPI: Pt is a 38 y.o.  female  who presents for  Annual and evaluation of pelvic pain and pain with intercourse x 6 months. Cycle: menarche- 14, Interval-  Q 1.5 month (5-6 weeks), Duration- 3 days, Flow- normal, denies dysmenorrhea. More regular than prior to tubal in 2016. She is sexually active- discomfort with any position.  She uses bilateral tubal ligation for contraception- since 2016.  She does desire STI screening. She reports vaginal discharge x 6 month- no odor.  Last pap: unsure.    Past Medical History:   Diagnosis Date    Migraine headache     PONV (postoperative nausea and vomiting)     STD (sexually transmitted disease)     Urinary tract infection        Past Surgical History:   Procedure Laterality Date    ARTHROSCOPY, KNEE Left 2018    [facility]    ARTHROSCOPY, KNEE, WITH CHONDROPLASTY Left 2018    Performed [facility] at [facility]    Breast Reduction      RELEASE, KNEE, LATERAL RETINACULA Left 2018    Per[facility] by [facility] at [facility] OR    TUBAL LIGATION      tummy aurora         Family History   Problem Relation Age of Onset    Cancer Paternal Grandmother     Lupus Maternal Grandmother     Hypertension Mother        Social History     Tobacco Use    Smoking status: Never Smoker    Smokeless tobacco: Never Used   Substance Use Topics    Alcohol use: Yes     Comment: occasional    Drug use: No       OB History    Para Term  AB Living   4 3     1     SAB TAB Ectopic Multiple Live Births     1            # Outcome Date GA Lbr Vito/2nd Weight Sex Delivery Anes PTL Lv   4 TAB            3 Para            2 Para            1 Para                   Current Outpatient Medications   Medication Sig    nitrofurantoin, macrocrystal-monohydrate, (MACROBID) 100 MG capsule Take 1 capsule (100 mg total) by mouth 2 (two) times daily.     No current  "facility-administered medications for this visit.        Allergies: Patient has no known allergies.     /80   Ht 5' 8.5" (1.74 m)   Wt 102.3 kg (225 lb 8.5 oz)   LMP 02/07/2019   BMI 33.79 kg/m²     ROS:  GENERAL: Denies fever or chills.   SKIN: Denies rash or lesions.   HEAD: Denies head injury or headache.   CHEST: Denies chest pain or shortness of breath.   CARDIOVASCULAR: Denies palpitations or chest pain.   ABDOMEN: No abdominal pain, constipation, diarrhea, nausea, vomiting or rectal bleeding.   URINARY: No dysuria, hematuria, or burning on urination.  REPRODUCTIVE: See HPI.   BREASTS: see HPI  NEUROLOGIC: Denies syncope or weakness.     Physical Exam:  GENERAL: alert, appears stated age and cooperative  NEUROLOGIC: orientated to person, place and time, normal mood and affect   CHEST: Normal respiratory effort  NECK: normal appearance, no thyromegaly masses or tenderness  SKIN: no acne, striae, hirsutism  BREAST EXAM: breasts appear normal, no suspicious masses, no skin or nipple changes or axillary nodes  ABDOMEN: abdomen is soft without significant tenderness, masses, organomegaly or guarding, no hernias noted  EXTERNAL GENITALIA:  normal general appearance  URETHRA: normal urethra, normal urethral meatus  VAGINA:  normal mucosa without prolapse or lesions  CERVIX:  Normal  UTERUS:  mobile, non-tender  ADNEXA:  Right adnexal fullness    Diagnosis:  1. Well woman exam with routine gynecological exam    2. Pelvic pain in female    3. Vaginal discharge    4. Screening for cervical cancer    5. Screening for STD (sexually transmitted disease)    6. Abnormal urine odor        Plan:  1. Annual  2. U/S ordered and f/u afterwards to discuss results  3. affirm  4. Pap/hpv today  5. Gc/ct  6. udip + nitrate - rx macrobid sent while awaiting urine culture results    Orders Placed This Encounter    Vaginosis Screen by DNA Probe    C. trachomatis/N. gonorrhoeae by AMP DNA    HPV High Risk Genotypes, PCR    " Urine culture    US Pelvis Comp with Transvag NON-OB (xpd    POCT URINE DIPSTICK WITHOUT MICROSCOPE    Liquid-based pap smear, screening    nitrofurantoin, macrocrystal-monohydrate, (MACROBID) 100 MG capsule    US OB/GYN Procedure ([facility])       Patient was counseled today on the new ACS guidelines for cervical cytology screening as well as the current recommendations for breast cancer screening. She was counseled to follow up with her PCP for other routine health maintenance.     No Follow-up on file.      Puja Hearn MD  OB/GYN  Pager: [phone]

## 2019-03-25 ENCOUNTER — PATIENT MESSAGE (OUTPATIENT)
Dept: OBSTETRICS AND GYNECOLOGY | Facility: CLINIC | Age: 39
End: 2019-03-25

## 2019-03-25 LAB
BACTERIA UR CULT: NORMAL
C TRACH DNA SPEC QL NAA+PROBE: NOT DETECTED
N GONORRHOEA DNA SPEC QL NAA+PROBE: NOT DETECTED

## 2019-03-26 LAB
BACTERIAL VAGINOSIS DNA: NEGATIVE
CANDIDA GLABRATA DNA: NEGATIVE
CANDIDA KRUSEI DNA: NEGATIVE
CANDIDA RRNA VAG QL PROBE: NEGATIVE
T VAGINALIS RRNA GENITAL QL PROBE: NEGATIVE

## 2019-03-26 NOTE — TELEPHONE ENCOUNTER
Thanks. Macrobid twice daily for 7 days- 14 pills. No refill.s     Puja Hearn MD, FACOG  OB/GYN  Pager: [phone]

## 2019-03-26 NOTE — TELEPHONE ENCOUNTER
Contacted pharmacy to correct Rx for Macrobid. Informed pharmacist the rx should be dispense 14 capsules with 0 refills. Pt already picked up the rx with 7 capsules so I told the pharmacy to process one of the refills for 7 more capsules to =14 and discard the remaining 13 refills.     --Contacted pt and explained to her she should be taking 2 capsules a day for 7 days and to  other 7 capsules from pharmacy, Patient verbalized understanding.

## 2019-03-28 LAB
HPV HR 12 DNA CVX QL NAA+PROBE: NEGATIVE
HPV16 AG SPEC QL: NEGATIVE
HPV18 DNA SPEC QL NAA+PROBE: NEGATIVE

## 2019-11-25 ENCOUNTER — HOSPITAL ENCOUNTER (EMERGENCY)
Facility: HOSPITAL | Age: 39
Discharge: HOME | End: 2019-11-25
Attending: EMERGENCY MEDICINE
Payer: COMMERCIAL

## 2019-11-25 VITALS
TEMPERATURE: 98 F | DIASTOLIC BLOOD PRESSURE: 89 MMHG | BODY MASS INDEX: 32.58 KG/M2 | SYSTOLIC BLOOD PRESSURE: 141 MMHG | OXYGEN SATURATION: 97 % | HEIGHT: 69 IN | WEIGHT: 220 LBS | RESPIRATION RATE: 20 BRPM | HEART RATE: 80 BPM

## 2019-11-25 DIAGNOSIS — M79.602 LEFT ARM PAIN: Primary | ICD-10-CM

## 2019-11-25 LAB
B-HCG UR QL: NEGATIVE
CTP QC/QA: YES

## 2019-11-25 PROCEDURE — 99284 EMERGENCY DEPT VISIT MOD MDM: CPT | Mod: 25

## 2019-11-25 PROCEDURE — 96372 THER/PROPH/DIAG INJ SC/IM: CPT

## 2019-11-25 PROCEDURE — 81025 URINE PREGNANCY TEST: CPT | Performed by: NURSE PRACTITIONER

## 2019-11-25 PROCEDURE — 63600175 PHARM REV CODE 636 W HCPCS: Performed by: NURSE PRACTITIONER

## 2019-11-25 RX ORDER — METHOCARBAMOL 500 MG/1
1000 TABLET, FILM COATED ORAL 3 TIMES DAILY
Qty: 30 TABLET | Refills: 0 | Status: SHIPPED | OUTPATIENT
Start: 2019-11-25 | End: 2019-11-30

## 2019-11-25 RX ORDER — KETOROLAC TROMETHAMINE 30 MG/ML
30 INJECTION, SOLUTION INTRAMUSCULAR; INTRAVENOUS
Status: COMPLETED | OUTPATIENT
Start: 2019-11-25 | End: 2019-11-25

## 2019-11-25 RX ORDER — KETOROLAC TROMETHAMINE 10 MG/1
10 TABLET, FILM COATED ORAL
Qty: 14 TABLET | Refills: 0 | Status: SHIPPED | OUTPATIENT
Start: 2019-11-25 | End: 2020-01-31

## 2019-11-25 RX ADMIN — KETOROLAC TROMETHAMINE 30 MG: 30 INJECTION, SOLUTION INTRAMUSCULAR at 08:11

## 2019-11-26 NOTE — ED NOTES
Pt. Reports pain to lt. Bicep area, onset several hours ago. Initial pain started after she was reaching back to  something. Shortly after she was unable to lift her arm up to chest level without severe pain. She took ibuprofen at 1200 with minimal relief. The pain does not radiate or extend past her elbow. Denies trauma or injury. Pain is 0 at rest, 8 with rom.

## 2019-11-26 NOTE — ED PROVIDER NOTES
Encounter Date: 11/25/2019    SCRIBE #1 NOTE: I, Laverne Hernandez , am scribing for, and in the presence of,  Raman Sneed. I have scribed the entire note.       History     Chief Complaint   Patient presents with    Arm Pain     Complaining of left upper arm since this morning when she tries to raise arm.     Desire Kilpatrick is a 39 y.o. female who  has a past medical history of Migraine headache, PONV (postoperative nausea and vomiting), STD (sexually transmitted disease), and Urinary tract infection.    The patient presents to the ED due to pain in left upper arm pain since this morning. Patient reports she was reaching to the back of her truck when pain began at approximately 11 AM. Pain is located to bicep area of arm that worsens when lifting arm upward. Patient denies any heavy lifting or injury to arm. Over the counter medications include Tylenol with no relief.  Patient states that she was initially concern for DVT due to her  having similar symptoms and diagnosed with DVT.  Patient denies any chest pain, shortness of breath, weakness, numbness/tingling or recent surgeries. Patient denies any cardiac history.  Denies history of blood clots.  Patient has no other complaints at this time.     The history is provided by the patient.     Review of patient's allergies indicates:  No Known Allergies  Past Medical History:   Diagnosis Date    Migraine headache     PONV (postoperative nausea and vomiting)     STD (sexually transmitted disease)     Urinary tract infection      Past Surgical History:   Procedure Laterality Date    ARTHROSCOPIC CHONDROPLASTY OF KNEE JOINT Left 8/24/2018    Procedure: ARTHROSCOPY, KNEE, WITH CHONDROPLASTY;  Surgeon: Konrad Mckeon MD;  Location: [facility] [place] OR;  Service: Orthopedics;  Laterality: Left;    ARTHROSCOPY OF KNEE Left 8/24/2018    Procedure: ARTHROSCOPY, KNEE;  Surgeon: Konrad Mckeon MD;  Location: [facility] [place] OR;  Service: Orthopedics;  Laterality: Left;     Breast Reduction      LATERAL RETINACULA RELEASE OF KNEE Left 8/24/2018    Procedure: RELEASE, KNEE, LATERAL RETINACULA;  Surgeon: Konrad Mckeon MD;  [facility];  Service: Orthopedics;  Laterality: Left;    TUBAL LIGATION      [facility]       Family History   Problem Relation Age of Onset    Cancer Paternal Grandmother     Lupus Maternal Grandmother     Hypertension Mother      Social History     Tobacco Use    Smoking status: Never Smoker    Smokeless tobacco: Never Used   Substance Use Topics    Alcohol use: Yes     Comment: occasional    Drug use: No     Review of Systems   Constitutional: Negative for fever.   Respiratory: Negative for shortness of breath.    Musculoskeletal: Positive for arthralgias. Negative for joint swelling.        + arm pain.   Neurological: Negative for weakness and numbness.   All other systems reviewed and are negative.      Physical Exam     Initial Vitals [11/25/19 1924]   BP Pulse Resp Temp SpO2   (!) 146/79 84 18 98.9 °F (37.2 °C) 99 %      MAP       --         Physical Exam    Vitals reviewed.  Constitutional: She appears well-developed and well-nourished.  Non-toxic appearance. She does not have a sickly appearance.   HENT:   Head: Atraumatic.   Mouth/Throat: Oropharynx is clear and moist.   Eyes: Pupils are equal, round, and reactive to light.   Neck: Normal range of motion and phonation normal. Neck supple.   Cardiovascular: Regular rhythm.   Pulses:       Radial pulses are 2+ on the right side, and 2+ on the left side.   Radial pulses equal.   Pulmonary/Chest: No respiratory distress.   Musculoskeletal: Normal range of motion. She exhibits tenderness. She exhibits no edema.   Sensation and strength intact in BUE.  No overlying warmth or surrounding erythema.  No signs of injury or trauma.  Reproducible left arm pain noted.   Decreased range of motion secondary to pain.   Neurological: She is alert and oriented to person, place, and time. She has normal  strength. No sensory deficit.   Skin: Skin is warm. No rash noted.   Psychiatric: She has a normal mood and affect.         ED Course   Procedures  Labs Reviewed   POCT URINE PREGNANCY             Medical Decision Making:   History:   Old Medical Records: I decided to obtain old medical records.  Initial Assessment:   Desire Kilpatrick is a 39 y.o. female who  has a past medical history of Migraine headache, PONV (postoperative nausea and vomiting), STD (sexually The patient presents to the ED due to pain in left upper arm pain since this morning. Patient reports she was reaching to the back of her truck when pain began at approximately 11 AM. Pain is located to bicep area of arm that worsens when lifting arm upward. Patient denies any heavy lifting or injury to arm. Over the counter medications include Tylenol with no relief.  Patient states that she was initially concern for DVT due to her  having similar symptoms and diagnosed with DVT.  Patient denies any chest pain, shortness of breath, weakness, numbness/tingling or recent surgeries. Patient denies any cardiac history.  Denies history of blood clots.  Patient has no other complaints at this time.         Clinical Tests:   Lab Tests: Ordered and Reviewed  ED Management:  UPT, IM toradol  UPT negative.  No imaging or labs needed at this time. PERC negative and I do not suspect DVT.  Patient reports improvement in pain after medication given in ED.  No signs of septic joint or cellulitis.  Patient's signs and symptoms most likely due to musculoskeletal pain. Patient is hemodynamically stable and will be discharged home with prescription for Toradol and Robaxin.  No contraindications to NSAIDs.  Patient instructed not to drive, drink alcohol, operate machinery while taking Robaxin.  Instructed follow up with PCP or [facility] in 2-3 days and to return to ED for any concerns or worsening symptoms.  Patient verbalized understanding,  compliance, and agreement with treatment plan.  Other:   I discussed test(s) with the performing physician.       <> Summary of the Findings: Care of this patient discussed with Dr. Choe who agrees with ED course and disposition.    Additional MDM:   PERC Rule:   Age is greater than or equal to 50 = 0.0  Heart Rate is greater than or equal to 100 = 0.0  SaO2 on room air < 95% = 0.0  Unilateral leg swelling = 0.0  Hemoptysis = 0.0  Recent surgery or trauma = 0.0  Prior PE or DVT =  0.0  Hormone use = 0.00  PERC Score = 0           Attending Attestation:     Physician Attestation Statement for NP/PA:   I have conducted a face to face encounter with this patient in addition to the NP/PA, due to NP/PA Request    Other NP/PA Attestation Additions:      Medical Decision Making: I independently examined and interviewed agree with assessment and plan.  31-year-old female, no real medical problems, presents the ER for evaluation with appears to be musculoskeletal pain.  Onset earlier today while reaching for an object, reproducible left arm pain noted. Patient reportedly was concern for DVT his family member has had that, however no risk factors, no clinical signs of DVT.  Patient reveals better after medication, patient will be discharged, muscle relaxant, anti-inflammatories, return precautions and primary care follow-up.  Patient understood agreed with plan.                               Clinical Impression:       ICD-10-CM ICD-9-CM   1. Left arm pain M79.602 729.5         I, Raman Keller, personally performed the services described in this documentation. All medical record entries made by the scribe were at my direction and in my presence.  I have reviewed the chart and agree that the record reflects my personal performance and is accurate and complete. JOHNATHAN Owens.  10:34 PM 11/25/2019                 Raman Keller NP  11/25/19 2339

## 2020-01-26 NOTE — PROGRESS NOTES
"CHIEF COMPLAINT:    Mrs. Kilpatrick is a 39 y.o. female presenting for a urinary tract infection     PRESENTING ILLNESS:    Desire Kilpatrick is a 39 y.o. female who presents stating that when she goes into the doctor and has her urine checked she is told there is bacteria and is treated for a "UTI" though she is completely asymptomatic.  Today, however, she has lower back pain, suprapubic discomfort and she had a vaginal discharge before she started her menses.  No fevers, chills or flank tenderness.  We reviewed her history in depth and normally, in the past she is in fact, asymptomatic.    , uterus in place, status post tubal ligation, sexually active, no pain, bowels are normal.    REVIEW OF SYSTEMS:    Review of Systems   Constitutional: Negative.    HENT: Negative.    Eyes: Negative.    Respiratory: Negative.    Cardiovascular: Negative.    Gastrointestinal: Negative.    Genitourinary:        Suprapubic tenderness   Musculoskeletal: Positive for back pain.   Skin: Negative.    Neurological: Negative.    Endo/Heme/Allergies: Negative.    Psychiatric/Behavioral: Negative.        PATIENT HISTORY:    Past Medical History:   Diagnosis Date    Migraine headache     PONV (postoperative nausea and vomiting)     STD (sexually transmitted disease)     Urinary tract infection        Past Surgical History:   Procedure Laterality Date    ARTHROSCOPIC CHONDROPLASTY OF KNEE JOINT Left 2018    Procedure: ARTHROSCOPY, KNEE, WITH CHONDROPLASTY;  Surgeon: Konrad Mckeon MD;  Location: [facility];  Service: Orthopedics;  Laterality: Left;    ARTHROSCOPY OF KNEE Left 2018    Procedure: ARTHROSCOPY, KNEE;  Surgeon: Konrad Mckeon MD;  Location: [facility] OR;  Service: Orthopedics;  Laterality: Left;    Breast Reduction      LATERAL RETINACULA RELEASE OF KNEE Left 2018    Procedure: RELEASE, KNEE, LATERAL RETINACULA;  Surgeon: Konrad Mckeon MD;  Location: [facility];  Service: Orthopedics;  Laterality: Left;    " TUBAL LIGATION      tummy tuck         Family History   Problem Relation Age of Onset    Cancer Paternal Grandmother     Lupus Maternal Grandmother     Hypertension Mother      Socioeconomic History    Marital status:    Tobacco Use    Smoking status: Never Smoker    Smokeless tobacco: Never Used   Substance and Sexual Activity    Alcohol use: Yes     Comment: occasional    Drug use: No    Sexual activity: Yes     Partners: Male       Allergies:  Patient has no known allergies.    Medications:  Outpatient Encounter Medications as of 1/31/2020   Medication Sig Dispense Refill    [DISCONTINUED] ketorolac (TORADOL) 10 mg tablet Take 1 tablet (10 mg total) by mouth 3 (three) times daily after meals. 14 tablet 0     No facility-administered encounter medications on file as of 1/31/2020.          PHYSICAL EXAMINATION:    The patient generally appears in good health, is appropriately interactive, and is in no apparent distress.    Skin: No lesions.    Mental: Cooperative with normal affect.    Neuro: Grossly intact.    HEENT: Normal. No evidence of lymphadenopathy.    Chest:  normal inspiratory effort.    Abdomen:  Soft, non-tender. No masses or organomegaly. Bladder is not palpable. No evidence of flank discomfort. No evidence of inguinal hernia.    Extremities: No clubbing, cyanosis, or edema    Normal external female genitalia  Urethral meatus is normal  Urethra and bladder are nontender to bimanual exam  Well supported anteriorly and posteriorly   Uterus and cervix are normal  No adnexal masses  PVR by catheterization was 5 ml  Bloody discharge from menses present    LABS:    No results found for: BUN, CREATININE    UA 1.015, pH 6, tr protein, 250 blood, otherwise, negative    IMPRESSION:    Encounter Diagnoses   Name Primary?    Acute cystitis without hematuria Yes       PLAN:    1.  BMP to have a baseline value  2.  The catheterized specimen was sent for culture  3.  Augmentin sent to preferred  pharmacy  4.  Discussed the indication for workup for recurrent UTI and she does not meet criteria.  Return as needed.        Complex Repair And Ftsg Text: The defect edges were debeveled with a #15 scalpel blade.  The primary defect was closed partially with a complex linear closure.  Given the location of the defect, shape of the defect and the proximity to free margins a full thickness skin graft was deemed most appropriate to repair the remaining defect.  The graft was trimmed to fit the size of the remaining defect.  The graft was then placed in the primary defect, oriented appropriately, and sutured into place.

## 2020-01-31 ENCOUNTER — OFFICE VISIT (OUTPATIENT)
Dept: UROLOGY | Facility: CLINIC | Age: 40
End: 2020-01-31
Payer: COMMERCIAL

## 2020-01-31 VITALS
BODY MASS INDEX: 33.54 KG/M2 | HEIGHT: 69 IN | DIASTOLIC BLOOD PRESSURE: 76 MMHG | SYSTOLIC BLOOD PRESSURE: 121 MMHG | WEIGHT: 226.44 LBS | HEART RATE: 78 BPM

## 2020-01-31 DIAGNOSIS — N30.00 ACUTE CYSTITIS WITHOUT HEMATURIA: Primary | ICD-10-CM

## 2020-01-31 PROCEDURE — 99215 PR OFFICE/OUTPT VISIT, EST, LEVL V, 40-54 MIN: ICD-10-PCS | Mod: 25,S$GLB,, | Performed by: UROLOGY

## 2020-01-31 PROCEDURE — 99215 OFFICE O/P EST HI 40 MIN: CPT | Mod: 25,S$GLB,, | Performed by: UROLOGY

## 2020-01-31 PROCEDURE — 51701 INSERT BLADDER CATHETER: CPT | Mod: S$GLB,,, | Performed by: UROLOGY

## 2020-01-31 PROCEDURE — 51701 PR INSERTION OF NON-INDWELLING BLADDER CATHETERIZATION FOR RESIDUAL UR: ICD-10-PCS | Mod: S$GLB,,, | Performed by: UROLOGY

## 2020-01-31 PROCEDURE — 3008F PR BODY MASS INDEX (BMI) DOCUMENTED: ICD-10-PCS | Mod: CPTII,S$GLB,, | Performed by: UROLOGY

## 2020-01-31 PROCEDURE — 99999 PR PBB SHADOW E&M-EST. PATIENT-LVL III: ICD-10-PCS | Mod: PBBFAC,,, | Performed by: UROLOGY

## 2020-01-31 PROCEDURE — 99999 PR PBB SHADOW E&M-EST. PATIENT-LVL III: CPT | Mod: PBBFAC,,, | Performed by: UROLOGY

## 2020-01-31 PROCEDURE — 87086 URINE CULTURE/COLONY COUNT: CPT

## 2020-01-31 PROCEDURE — 3008F BODY MASS INDEX DOCD: CPT | Mod: CPTII,S$GLB,, | Performed by: UROLOGY

## 2020-01-31 RX ORDER — AMOXICILLIN AND CLAVULANATE POTASSIUM 500; 125 MG/1; MG/1
1 TABLET, FILM COATED ORAL 3 TIMES DAILY
Qty: 21 TABLET | Refills: 0 | Status: SHIPPED | OUTPATIENT
Start: 2020-01-31 | End: 2020-02-07

## 2020-02-01 LAB — BACTERIA UR CULT: NO GROWTH

## 2020-04-24 ENCOUNTER — PATIENT MESSAGE (OUTPATIENT)
Dept: UROLOGY | Facility: CLINIC | Age: 40
End: 2020-04-24

## 2020-04-24 ENCOUNTER — OFFICE VISIT (OUTPATIENT)
Dept: UROLOGY | Facility: CLINIC | Age: 40
End: 2020-04-24
Payer: COMMERCIAL

## 2020-04-24 DIAGNOSIS — M62.89 HIGH-TONE PELVIC FLOOR DYSFUNCTION: Primary | ICD-10-CM

## 2020-04-24 PROCEDURE — 99213 OFFICE O/P EST LOW 20 MIN: CPT | Mod: 95,,, | Performed by: UROLOGY

## 2020-04-24 PROCEDURE — 99213 PR OFFICE/OUTPT VISIT, EST, LEVL III, 20-29 MIN: ICD-10-PCS | Mod: 95,,, | Performed by: UROLOGY

## 2020-04-24 NOTE — PROGRESS NOTES
The patient location is:  home  The chief complaint leading to consultation is:  Pelvic discomfort  Visit type: audiovisual  Total time spent with patient: 13 minutes  Each patient to whom he or she provides medical services by telemedicine is:  (1) informed of the relationship between the physician and patient and the respective role of any other health care provider with respect to management of the patient; and (2) notified that he or she may decline to receive medical services by telemedicine and may withdraw from such care at any time.    This patient was evaluated during the COVID-19 pandemic. Work up/disposition may be affected by resource limitations and concerns regarding nosocomial infections.     CHIEF COMPLAINT:    Mrs. Kilpatrick is a 39 y.o. female presenting for an urgent appointment for pelvic floor dysfunction     PRESENTING ILLNESS:    Desire Kilpatrick is a 39 y.o. female who sent a message through [facility] stating that she had discomfort in the vagina, feels like a tightness.  She also feels like she needs to strain to urinate. She is on iron supplementation but denies having constipation.  She has dyspareunia with deep penetration not initial. She states as she is sitting down, she has an awareness of the perineum and vagina whereas, she normally does not think about having any sensation in that area.      She had blood on the toilet paper and was not sure if it originated from the urethra, vagina or anus. Was one time.  I last saw her in January for a UTI, but her frequency of UTI was not enough to trigger a workup at that time.     REVIEW OF SYSTEMS:    Review of Systems   Constitutional: Negative.    HENT: Negative.    Eyes: Negative.    Respiratory: Negative.    Cardiovascular: Negative.    Gastrointestinal: Negative for constipation.   Genitourinary:        Vaginal discomfort, hesitancy   Musculoskeletal: Positive for myalgias.   Skin: Negative.    Neurological: Negative.     Endo/Heme/Allergies: Negative.    Psychiatric/Behavioral: Negative.        PATIENT HISTORY:    Past Medical History:   Diagnosis Date    Migraine headache     PONV (postoperative nausea and vomiting)     STD (sexually transmitted disease)     Urinary tract infection        Past Surgical History:   Procedure Laterality Date    ARTHROSCOPIC CHONDROPLASTY OF KNEE JOINT Left 8/24/2018    Procedure: ARTHROSCOPY, KNEE, WITH CHONDROPLASTY;  Surgeon: Konrad Mckeon MD;  Location: [facility];  Service: Orthopedics;  Laterality: Left;    ARTHROSCOPY OF KNEE Left 8/24/2018    Procedure: ARTHROSCOPY, KNEE;  Surgeon: Konrda Mckeon MD;  Location: [facility];  Service: Orthopedics;  Laterality: Left;    Breast Reduction      LATERAL RETINACULA RELEASE OF KNEE Left 8/24/2018    Procedure: RELEASE, KNEE, LATERAL RETINACULA;  Surgeon: Konrad Mckeon MD;  Location: [facility];  Service: Orthopedics;  Laterality: Left;    TUBAL LIGATION      tummy tuck         Family History   Problem Relation Age of Onset    Cancer Paternal Grandmother     Lupus Maternal Grandmother     Hypertension Mother      Socioeconomic History    Marital status:    Tobacco Use    Smoking status: Never Smoker    Smokeless tobacco: Never Used   Substance and Sexual Activity    Alcohol use: Yes     Comment: occasional    Drug use: No    Sexual activity: Yes     Partners: Male     Allergies:  Patient has no known allergies.    Medications:  No outpatient encounter medications on file as of 4/24/2020.     No facility-administered encounter medications on file as of 4/24/2020.          PHYSICAL EXAMINATION:    The patient generally appears in good health, is appropriately interactive, and is in no apparent distress.    Skin: No facial lesions.    Mental: Cooperative with normal affect.    Neuro: Grossly intact.    HEENT: Normal. No visual evidence of lymphadenopathy.    Chest:  normal inspiratory effort.  Breathing is unlabored, no obvious  abnormal breath sounds    LABS:    No results found for: BUN, CREATININE        IMPRESSION:    Encounter Diagnoses   Name Primary?    High-tone pelvic floor dysfunction Yes       PLAN:    1.  Discussed the diagnosis of high tone PFD.  I had given her the website information  2.  Would like to have her come in for an exam to confirm  3.  Will also check her for prolapse  4.  Will likely be able to start seeing patients the beginning of May.

## 2020-05-01 ENCOUNTER — TELEPHONE (OUTPATIENT)
Dept: UROLOGY | Facility: CLINIC | Age: 40
End: 2020-05-01

## 2020-05-22 ENCOUNTER — OFFICE VISIT (OUTPATIENT)
Dept: UROLOGY | Facility: CLINIC | Age: 40
End: 2020-05-22
Payer: COMMERCIAL

## 2020-05-22 VITALS
BODY MASS INDEX: 33.34 KG/M2 | HEIGHT: 69 IN | WEIGHT: 225.06 LBS | SYSTOLIC BLOOD PRESSURE: 128 MMHG | HEART RATE: 80 BPM | DIASTOLIC BLOOD PRESSURE: 78 MMHG

## 2020-05-22 DIAGNOSIS — M62.89 HIGH-TONE PELVIC FLOOR DYSFUNCTION: Primary | ICD-10-CM

## 2020-05-22 PROCEDURE — 81002 PR URINALYSIS NONAUTO W/O SCOPE: ICD-10-PCS | Mod: S$GLB,,, | Performed by: UROLOGY

## 2020-05-22 PROCEDURE — 99999 PR PBB SHADOW E&M-EST. PATIENT-LVL III: CPT | Mod: PBBFAC,,, | Performed by: UROLOGY

## 2020-05-22 PROCEDURE — 99213 OFFICE O/P EST LOW 20 MIN: CPT | Mod: 25,S$GLB,, | Performed by: UROLOGY

## 2020-05-22 PROCEDURE — 3008F BODY MASS INDEX DOCD: CPT | Mod: CPTII,S$GLB,, | Performed by: UROLOGY

## 2020-05-22 PROCEDURE — 99999 PR PBB SHADOW E&M-EST. PATIENT-LVL III: ICD-10-PCS | Mod: PBBFAC,,, | Performed by: UROLOGY

## 2020-05-22 PROCEDURE — 3008F PR BODY MASS INDEX (BMI) DOCUMENTED: ICD-10-PCS | Mod: CPTII,S$GLB,, | Performed by: UROLOGY

## 2020-05-22 PROCEDURE — 81002 URINALYSIS NONAUTO W/O SCOPE: CPT | Mod: S$GLB,,, | Performed by: UROLOGY

## 2020-05-22 PROCEDURE — 99213 PR OFFICE/OUTPT VISIT, EST, LEVL III, 20-29 MIN: ICD-10-PCS | Mod: 25,S$GLB,, | Performed by: UROLOGY

## 2020-05-22 NOTE — PROGRESS NOTES
CHIEF COMPLAINT:    Mrs. Kilpatrick is a 39 y.o. female presenting for a follow up for exam for pelvic pain    PRESENTING ILLNESS:    Desire Kilpatrick is a 39 y.o. female who returns for follow up.  She has a history of pelvic discomfort and vaginal tightness.  I last had a visit with her on 4/24/2020 which was a telemedicine visit so could not examine her.  She states her symptoms are the same.  She has the pelvic discomfort and dyspareunia.     REVIEW OF SYSTEMS:    Review of Systems   Constitutional: Negative.    HENT: Negative.    Eyes: Negative.    Respiratory: Negative.    Cardiovascular: Negative.    Gastrointestinal: Negative.    Genitourinary: Positive for dysuria.        Vaginal discomfort, dyspareunia   Musculoskeletal: Positive for myalgias.   Skin: Negative.    Neurological: Negative.    Endo/Heme/Allergies: Negative.    Psychiatric/Behavioral: Negative.        PATIENT HISTORY:    Past Medical History:   Diagnosis Date    Migraine headache     PONV (postoperative nausea and vomiting)     STD (sexually transmitted disease)     Urinary tract infection        Past Surgical History:   Procedure Laterality Date    ARTHROSCOPIC CHONDROPLASTY OF KNEE JOINT Left 8/24/2018    Procedure: ARTHROSCOPY, KNEE, WITH CHONDROPLASTY;  Surgeon: Konrad Mckeon MD;  Location: [facility];  Service: Orthopedics;  Laterality: Left;    ARTHROSCOPY OF KNEE Left 8/24/2018    Procedure: ARTHROSCOPY, KNEE;  Surgeon: Konrad Mckeon MD;  Location: [facility];  Service: Orthopedics;  Laterality: Left;    Breast Reduction      LATERAL RETINACULA RELEASE OF KNEE Left 8/24/2018    Procedure: RELEASE, KNEE, LATERAL RETINACULA;  Surgeon: Konrad Mckeon MD;  Location: [facility];  Service: Orthopedics;  Laterality: Left;    TUBAL LIGATION      tummy tuck         Family History   Problem Relation Age of Onset    Cancer Paternal Grandmother     Lupus Maternal Grandmother     Hypertension Mother      Socioeconomic History    Marital  status:    Tobacco Use    Smoking status: Never Smoker    Smokeless tobacco: Never Used   Substance and Sexual Activity    Alcohol use: Yes     Comment: occasional    Drug use: No    Sexual activity: Yes     Partners: Male       Allergies:  Patient has no known allergies.    Medications:  No outpatient encounter medications on file as of 5/22/2020.     No facility-administered encounter medications on file as of 5/22/2020.          PHYSICAL EXAMINATION:    The patient generally appears in good health, is appropriately interactive, and is in no apparent distress.    Skin: No lesions.    Mental: Cooperative with normal affect.    Neuro: Grossly intact.    HEENT: Normal. No evidence of lymphadenopathy.    Chest:  normal inspiratory effort.    Abdomen:  Soft, non-tender. No masses or organomegaly. Bladder is not palpable. No evidence of flank discomfort. No evidence of inguinal hernia.    Extremities: No clubbing, cyanosis, or edema    Normal external female genitalia  Urethral meatus is normal  Urethra and bladder are nontender to bimanual exam  Well supported anteriorly and posteriorly   Levator ani muscle is tender on the left moreso than the right side  Uterus and cervix are normal  No adnexal masses    LABS:    No results found for: BUN, CREATININE    UA 1.015, pH 5, otherwise, negative    IMPRESSION:    Encounter Diagnoses   Name Primary?    High-tone pelvic floor dysfunction Yes       PLAN:    1.  Physical therapy was ordered  2.  Discussed the diagnosis with the pelvic floor anatomy  3.  Follow up in 6 months  4.  BMP today

## 2020-07-31 ENCOUNTER — OFFICE VISIT (OUTPATIENT)
Dept: UROLOGY | Facility: CLINIC | Age: 40
End: 2020-07-31
Payer: COMMERCIAL

## 2020-07-31 ENCOUNTER — LAB VISIT (OUTPATIENT)
Dept: LAB | Facility: HOSPITAL | Age: 40
End: 2020-07-31
Attending: UROLOGY
Payer: COMMERCIAL

## 2020-07-31 DIAGNOSIS — R82.90 MALODOROUS URINE: ICD-10-CM

## 2020-07-31 DIAGNOSIS — R82.90 MALODOROUS URINE: Primary | ICD-10-CM

## 2020-07-31 PROCEDURE — 87088 URINE BACTERIA CULTURE: CPT

## 2020-07-31 PROCEDURE — 99212 PR OFFICE/OUTPT VISIT, EST, LEVL II, 10-19 MIN: ICD-10-PCS | Mod: 95,,, | Performed by: UROLOGY

## 2020-07-31 PROCEDURE — 87086 URINE CULTURE/COLONY COUNT: CPT

## 2020-07-31 PROCEDURE — 99212 OFFICE O/P EST SF 10 MIN: CPT | Mod: 95,,, | Performed by: UROLOGY

## 2020-07-31 PROCEDURE — 87186 SC STD MICRODIL/AGAR DIL: CPT

## 2020-07-31 PROCEDURE — 87077 CULTURE AEROBIC IDENTIFY: CPT

## 2020-07-31 RX ORDER — SULFAMETHOXAZOLE AND TRIMETHOPRIM 800; 160 MG/1; MG/1
1 TABLET ORAL 2 TIMES DAILY
Qty: 14 TABLET | Refills: 0 | Status: SHIPPED | OUTPATIENT
Start: 2020-07-31 | End: 2020-08-07

## 2020-07-31 NOTE — PROGRESS NOTES
The patient location is:  home  The chief complaint leading to consultation is:  Malodorous urine    Visit type: audiovisual    Time with patient:  10 minutes of total time spent on the encounter, which includes face to face time and non-face to face time preparing to see the patient (eg, review of tests), obtaining and/or reviewing separately obtained history, documenting clinical information in the electronic or other health record, independently interpreting results (not separately reported) and communicating results to the patient/family/caregiver, or care coordination (not separately reported).     Each patient to whom he or she provides medical services by telemedicine is:  (1) informed of the relationship between the physician and patient and the respective role of any other health care provider with respect to management of the patient; and (2) notified that he or she may decline to receive medical services by telemedicine and may withdraw from such care at any time.      CHIEF COMPLAINT:    Mrs. Kilpatrick is a 39 y.o. female presenting for malorodous urine    PRESENTING ILLNESS:    Desire Kilpatrick is a 39 y.o. female who states her  started supplements several weeks ago, they had intercourse and then she started having a malodorous urine.  She states it initially started intermittently and now it is persistent.  She has no other symptoms, specifically denied any urgency, frequency, dysuria, vaginal itching.  She has no fevers or chills.  She is quarantining at home with her family as her  and son both tested positive for coronavirus.  She has not been tested.  Her son and  have no symptoms.     REVIEW OF SYSTEMS:    Review of Systems   Constitutional: Negative.    HENT: Negative.    Eyes: Negative.    Respiratory: Negative.    Cardiovascular: Negative.    Gastrointestinal: Negative.    Genitourinary:        Malodorous urine  Has pelvic floor dysfunction   Musculoskeletal: Negative.     Skin: Negative.    Neurological: Negative.    Endo/Heme/Allergies: Negative.    Psychiatric/Behavioral: Negative.        PATIENT HISTORY:    Past Medical History:   Diagnosis Date    Migraine headache     PONV (postoperative nausea and vomiting)     STD (sexually transmitted disease)     Urinary tract infection        Past Surgical History:   Procedure Laterality Date    ARTHROSCOPIC CHONDROPLASTY OF KNEE JOINT Left 8/24/2018    Procedure: ARTHROSCOPY, KNEE, WITH CHONDROPLASTY;  Surgeon: Konrad Mckeon MD;  Location: [facility];  Service: Orthopedics;  Laterality: Left;    ARTHROSCOPY OF KNEE Left 8/24/2018    Procedure: ARTHROSCOPY, KNEE;  Surgeon: Konrad Mckeon MD;  Location: [facility];  Service: Orthopedics;  Laterality: Left;    Breast Reduction      LATERAL RETINACULA RELEASE OF KNEE Left 8/24/2018    Procedure: RELEASE, KNEE, LATERAL RETINACULA;  Surgeon: Konrad Mckeon MD;  Location: [facility];  Service: Orthopedics;  Laterality: Left;    TUBAL LIGATION      tummy davidck         Family History   Problem Relation Age of Onset    Cancer Paternal Grandmother     Lupus Maternal Grandmother     Hypertension Mother        Social History     Socioeconomic History    Marital status:      Spouse name: Not on file    Number of children: Not on file    Years of education: Not on file    Highest education level: Not on file   Occupational History    Not on file   Social Needs    Financial resource strain: Not on file    Food insecurity     Worry: Not on file     Inability: Not on file    Transportation needs     Medical: Not on file     Non-medical: Not on file   Tobacco Use    Smoking status: Never Smoker    Smokeless tobacco: Never Used   Substance and Sexual Activity    Alcohol use: Yes     Comment: occasional    Drug use: No    Sexual activity: Yes     Partners: Male   Lifestyle       Allergies:  Patient has no known allergies.    Medications:  Outpatient Encounter Medications as of  7/31/2020   Medication Sig Dispense Refill    sulfamethoxazole-trimethoprim 800-160mg (BACTRIM DS) 800-160 mg Tab Take 1 tablet by mouth 2 (two) times daily. for 7 days 14 tablet 0     No facility-administered encounter medications on file as of 7/31/2020.          PHYSICAL EXAMINATION:    The patient generally appears in good health, is appropriately interactive, and is in no apparent distress.    Chest:  Breathing is unlabored, no obvious abnormal breath sounds.       LABS:    Lab Results   Component Value Date    BUN 12 05/22/2020    CREATININE 0.9 05/22/2020       IMPRESSION:    Encounter Diagnoses   Name Primary?    Malodorous urine Yes       PLAN:    1.  Normally, I would not treat this, however, she has had worsening of the malodorous urine.  Would not want her to be immunocompromised in the face of having 2 family members positive for corona virus  2.  Urine culture ordered  3.  Bactrim ds sent empirically.

## 2020-08-03 ENCOUNTER — TELEPHONE (OUTPATIENT)
Dept: UROLOGY | Facility: CLINIC | Age: 40
End: 2020-08-03

## 2020-08-03 LAB — BACTERIA UR CULT: ABNORMAL

## 2020-08-03 NOTE — TELEPHONE ENCOUNTER
----- Message from Leonila Carrillo MD sent at 8/3/2020 11:35 AM CDT -----  Bactrim DS was ordered for the patient at the time of collection.  Please have her complete the antibiotics.  Thanks.   (0) swallows foods and liquids w/o difficulty

## 2021-02-18 ENCOUNTER — IMMUNIZATION (OUTPATIENT)
Dept: INTERNAL MEDICINE | Facility: CLINIC | Age: 41
End: 2021-02-18
Payer: COMMERCIAL

## 2021-02-18 DIAGNOSIS — Z23 NEED FOR VACCINATION: Primary | ICD-10-CM

## 2021-02-18 PROCEDURE — 91300 COVID-19, MRNA, LNP-S, PF, 30 MCG/0.3 ML DOSE VACCINE: CPT | Mod: PBBFAC | Performed by: INTERNAL MEDICINE

## 2021-03-11 ENCOUNTER — IMMUNIZATION (OUTPATIENT)
Dept: INTERNAL MEDICINE | Facility: CLINIC | Age: 41
End: 2021-03-11
Payer: COMMERCIAL

## 2021-03-11 DIAGNOSIS — Z23 NEED FOR VACCINATION: Primary | ICD-10-CM

## 2021-03-11 PROCEDURE — 0002A COVID-19, MRNA, LNP-S, PF, 30 MCG/0.3 ML DOSE VACCINE: CPT | Mod: PBBFAC | Performed by: INTERNAL MEDICINE

## 2021-03-11 PROCEDURE — 91300 COVID-19, MRNA, LNP-S, PF, 30 MCG/0.3 ML DOSE VACCINE: CPT | Mod: PBBFAC | Performed by: INTERNAL MEDICINE

## 2021-03-12 ENCOUNTER — OFFICE VISIT (OUTPATIENT)
Dept: OBSTETRICS AND GYNECOLOGY | Facility: CLINIC | Age: 41
End: 2021-03-12
Payer: COMMERCIAL

## 2021-03-12 VITALS — SYSTOLIC BLOOD PRESSURE: 124 MMHG | BODY MASS INDEX: 33.37 KG/M2 | DIASTOLIC BLOOD PRESSURE: 66 MMHG | WEIGHT: 226 LBS

## 2021-03-12 DIAGNOSIS — Z01.419 WELL WOMAN EXAM WITH ROUTINE GYNECOLOGICAL EXAM: Primary | ICD-10-CM

## 2021-03-12 DIAGNOSIS — Z12.4 SCREENING FOR CERVICAL CANCER: ICD-10-CM

## 2021-03-12 DIAGNOSIS — Z12.31 ENCOUNTER FOR SCREENING MAMMOGRAM FOR BREAST CANCER: ICD-10-CM

## 2021-03-12 DIAGNOSIS — N92.0 MENORRHAGIA WITH REGULAR CYCLE: ICD-10-CM

## 2021-03-12 PROCEDURE — 99999 PR PBB SHADOW E&M-EST. PATIENT-LVL II: ICD-10-PCS | Mod: PBBFAC,,, | Performed by: OBSTETRICS & GYNECOLOGY

## 2021-03-12 PROCEDURE — 99999 PR PBB SHADOW E&M-EST. PATIENT-LVL II: CPT | Mod: PBBFAC,,, | Performed by: OBSTETRICS & GYNECOLOGY

## 2021-03-12 PROCEDURE — 99395 PREV VISIT EST AGE 18-39: CPT | Mod: S$GLB,,, | Performed by: OBSTETRICS & GYNECOLOGY

## 2021-03-12 PROCEDURE — 3008F PR BODY MASS INDEX (BMI) DOCUMENTED: ICD-10-PCS | Mod: CPTII,S$GLB,, | Performed by: OBSTETRICS & GYNECOLOGY

## 2021-03-12 PROCEDURE — 99395 PR PREVENTIVE VISIT,EST,18-39: ICD-10-PCS | Mod: S$GLB,,, | Performed by: OBSTETRICS & GYNECOLOGY

## 2021-03-12 PROCEDURE — 88175 CYTOPATH C/V AUTO FLUID REDO: CPT | Performed by: OBSTETRICS & GYNECOLOGY

## 2021-03-12 PROCEDURE — 3008F BODY MASS INDEX DOCD: CPT | Mod: CPTII,S$GLB,, | Performed by: OBSTETRICS & GYNECOLOGY

## 2021-03-14 LAB
C TRACH RRNA SPEC QL NAA+PROBE: NEGATIVE
N GONORRHOEA RRNA SPEC QL NAA+PROBE: NEGATIVE

## 2021-03-15 ENCOUNTER — PATIENT MESSAGE (OUTPATIENT)
Dept: OBSTETRICS AND GYNECOLOGY | Facility: CLINIC | Age: 41
End: 2021-03-15

## 2021-03-16 ENCOUNTER — HOSPITAL ENCOUNTER (OUTPATIENT)
Dept: RADIOLOGY | Facility: OTHER | Age: 41
Discharge: HOME | End: 2021-03-16
Attending: OBSTETRICS & GYNECOLOGY
Payer: COMMERCIAL

## 2021-03-16 DIAGNOSIS — N92.0 MENORRHAGIA WITH REGULAR CYCLE: ICD-10-CM

## 2021-03-16 PROCEDURE — 76856 US EXAM PELVIC COMPLETE: CPT | Mod: TC

## 2021-03-16 PROCEDURE — 76830 TRANSVAGINAL US NON-OB: CPT | Mod: 26,,, | Performed by: RADIOLOGY

## 2021-03-16 PROCEDURE — 76856 US EXAM PELVIC COMPLETE: CPT | Mod: 26,,, | Performed by: RADIOLOGY

## 2021-03-16 PROCEDURE — 76856 US PELVIS COMP WITH TRANSVAG NON-OB (XPD): ICD-10-PCS | Mod: 26,,, | Performed by: RADIOLOGY

## 2021-03-16 PROCEDURE — 76830 US PELVIS COMP WITH TRANSVAG NON-OB (XPD): ICD-10-PCS | Mod: 26,,, | Performed by: RADIOLOGY

## 2021-03-20 ENCOUNTER — PATIENT MESSAGE (OUTPATIENT)
Dept: OBSTETRICS AND GYNECOLOGY | Facility: CLINIC | Age: 41
End: 2021-03-20

## 2021-03-23 LAB
FINAL PATHOLOGIC DIAGNOSIS: NORMAL
Lab: NORMAL

## 2021-03-29 ENCOUNTER — OFFICE VISIT (OUTPATIENT)
Dept: OBSTETRICS AND GYNECOLOGY | Facility: CLINIC | Age: 41
End: 2021-03-29
Payer: COMMERCIAL

## 2021-03-29 DIAGNOSIS — D21.9 FIBROID: Primary | ICD-10-CM

## 2021-03-29 PROCEDURE — 99212 PR OFFICE/OUTPT VISIT, EST, LEVL II, 10-19 MIN: ICD-10-PCS | Mod: 95,,, | Performed by: OBSTETRICS & GYNECOLOGY

## 2021-03-29 PROCEDURE — 99212 OFFICE O/P EST SF 10 MIN: CPT | Mod: 95,,, | Performed by: OBSTETRICS & GYNECOLOGY

## 2021-04-13 DIAGNOSIS — Z01.818 PRE-OP TESTING: Primary | ICD-10-CM

## 2021-05-05 ENCOUNTER — TELEPHONE (OUTPATIENT)
Dept: INTERVENTIONAL RADIOLOGY/VASCULAR | Facility: CLINIC | Age: 41
End: 2021-05-05

## 2021-06-18 ENCOUNTER — ANESTHESIA EVENT (OUTPATIENT)
Dept: SURGERY | Facility: HOSPITAL | Age: 41
End: 2021-06-18
Payer: COMMERCIAL

## 2021-06-18 ENCOUNTER — HOSPITAL ENCOUNTER (OUTPATIENT)
Dept: PREADMISSION TESTING | Facility: HOSPITAL | Age: 41
Discharge: HOME | End: 2021-06-18
Attending: OBSTETRICS & GYNECOLOGY
Payer: COMMERCIAL

## 2021-06-18 ENCOUNTER — OFFICE VISIT (OUTPATIENT)
Dept: OBSTETRICS AND GYNECOLOGY | Facility: CLINIC | Age: 41
End: 2021-06-18
Payer: COMMERCIAL

## 2021-06-18 VITALS
SYSTOLIC BLOOD PRESSURE: 145 MMHG | WEIGHT: 222.69 LBS | RESPIRATION RATE: 16 BRPM | HEART RATE: 78 BPM | DIASTOLIC BLOOD PRESSURE: 85 MMHG | HEIGHT: 68 IN | OXYGEN SATURATION: 97 % | BODY MASS INDEX: 33.75 KG/M2

## 2021-06-18 VITALS
DIASTOLIC BLOOD PRESSURE: 84 MMHG | SYSTOLIC BLOOD PRESSURE: 124 MMHG | WEIGHT: 222.88 LBS | BODY MASS INDEX: 33.89 KG/M2

## 2021-06-18 DIAGNOSIS — Z01.818 PREOP TESTING: Primary | ICD-10-CM

## 2021-06-18 DIAGNOSIS — Z01.818 PREOP EXAMINATION: Primary | ICD-10-CM

## 2021-06-18 DIAGNOSIS — D25.1 INTRAMURAL LEIOMYOMA OF UTERUS: ICD-10-CM

## 2021-06-18 PROCEDURE — 1126F PR PAIN SEVERITY QUANTIFIED, NO PAIN PRESENT: ICD-10-PCS | Mod: S$GLB,,, | Performed by: OBSTETRICS & GYNECOLOGY

## 2021-06-18 PROCEDURE — 3008F BODY MASS INDEX DOCD: CPT | Mod: CPTII,S$GLB,, | Performed by: OBSTETRICS & GYNECOLOGY

## 2021-06-18 PROCEDURE — 3008F PR BODY MASS INDEX (BMI) DOCUMENTED: ICD-10-PCS | Mod: CPTII,S$GLB,, | Performed by: OBSTETRICS & GYNECOLOGY

## 2021-06-18 PROCEDURE — 99999 PR PBB SHADOW E&M-EST. PATIENT-LVL II: ICD-10-PCS | Mod: PBBFAC,,, | Performed by: OBSTETRICS & GYNECOLOGY

## 2021-06-18 PROCEDURE — 99214 PR OFFICE/OUTPT VISIT, EST, LEVL IV, 30-39 MIN: ICD-10-PCS | Mod: S$GLB,,, | Performed by: OBSTETRICS & GYNECOLOGY

## 2021-06-18 PROCEDURE — 99214 OFFICE O/P EST MOD 30 MIN: CPT | Mod: S$GLB,,, | Performed by: OBSTETRICS & GYNECOLOGY

## 2021-06-18 PROCEDURE — 1126F AMNT PAIN NOTED NONE PRSNT: CPT | Mod: S$GLB,,, | Performed by: OBSTETRICS & GYNECOLOGY

## 2021-06-18 PROCEDURE — 99999 PR PBB SHADOW E&M-EST. PATIENT-LVL II: CPT | Mod: PBBFAC,,, | Performed by: OBSTETRICS & GYNECOLOGY

## 2021-06-18 RX ORDER — AMOXICILLIN 250 MG
1 CAPSULE ORAL 2 TIMES DAILY
Status: CANCELLED | OUTPATIENT
Start: 2021-06-18

## 2021-06-18 RX ORDER — ONDANSETRON 2 MG/ML
4 INJECTION INTRAMUSCULAR; INTRAVENOUS DAILY PRN
Status: CANCELLED | OUTPATIENT
Start: 2021-06-18

## 2021-06-18 RX ORDER — SODIUM CHLORIDE, SODIUM LACTATE, POTASSIUM CHLORIDE, CALCIUM CHLORIDE 600; 310; 30; 20 MG/100ML; MG/100ML; MG/100ML; MG/100ML
INJECTION, SOLUTION INTRAVENOUS CONTINUOUS
Status: CANCELLED | OUTPATIENT
Start: 2021-06-18

## 2021-06-18 RX ORDER — ACETAMINOPHEN 500 MG
1000 TABLET ORAL
Status: CANCELLED | OUTPATIENT
Start: 2021-06-18

## 2021-06-18 RX ORDER — MEPERIDINE HYDROCHLORIDE 100 MG/ML
12.5 INJECTION INTRAMUSCULAR; INTRAVENOUS; SUBCUTANEOUS ONCE AS NEEDED
Status: CANCELLED | OUTPATIENT
Start: 2021-06-18 | End: 2021-06-19

## 2021-06-18 RX ORDER — HYDROMORPHONE HYDROCHLORIDE 2 MG/ML
1 INJECTION, SOLUTION INTRAMUSCULAR; INTRAVENOUS; SUBCUTANEOUS EVERY 4 HOURS PRN
Status: CANCELLED | OUTPATIENT
Start: 2021-06-18

## 2021-06-18 RX ORDER — ONDANSETRON 4 MG/1
8 TABLET, ORALLY DISINTEGRATING ORAL EVERY 8 HOURS PRN
Status: CANCELLED | OUTPATIENT
Start: 2021-06-18

## 2021-06-18 RX ORDER — PREGABALIN 25 MG/1
50 CAPSULE ORAL
Status: CANCELLED | OUTPATIENT
Start: 2021-06-18

## 2021-06-18 RX ORDER — IBUPROFEN 200 MG
800 TABLET ORAL
Status: CANCELLED | OUTPATIENT
Start: 2021-06-19

## 2021-06-18 RX ORDER — CELECOXIB 100 MG/1
400 CAPSULE ORAL
Status: CANCELLED | OUTPATIENT
Start: 2021-06-18

## 2021-06-18 RX ORDER — PROCHLORPERAZINE EDISYLATE 5 MG/ML
5 INJECTION INTRAMUSCULAR; INTRAVENOUS EVERY 10 MIN PRN
Status: CANCELLED | OUTPATIENT
Start: 2021-06-18

## 2021-06-18 RX ORDER — KETOROLAC TROMETHAMINE 30 MG/ML
30 INJECTION, SOLUTION INTRAMUSCULAR; INTRAVENOUS
Status: CANCELLED | OUTPATIENT
Start: 2021-06-18 | End: 2021-06-19

## 2021-06-18 RX ORDER — HYDROCODONE BITARTRATE AND ACETAMINOPHEN 5; 325 MG/1; MG/1
1 TABLET ORAL EVERY 4 HOURS PRN
Status: CANCELLED | OUTPATIENT
Start: 2021-06-18

## 2021-06-18 RX ORDER — OXYCODONE HYDROCHLORIDE 5 MG/1
5 TABLET ORAL
Status: CANCELLED | OUTPATIENT
Start: 2021-06-18

## 2021-06-18 RX ORDER — POLYETHYLENE GLYCOL 3350 17 G/17G
17 POWDER, FOR SOLUTION ORAL DAILY
Status: CANCELLED | OUTPATIENT
Start: 2021-06-18

## 2021-06-18 RX ORDER — SCOLOPAMINE TRANSDERMAL SYSTEM 1 MG/1
1 PATCH, EXTENDED RELEASE TRANSDERMAL
Status: CANCELLED | OUTPATIENT
Start: 2021-06-18

## 2021-06-18 RX ORDER — ACETAMINOPHEN 325 MG/1
650 TABLET ORAL EVERY 4 HOURS PRN
Status: CANCELLED | OUTPATIENT
Start: 2021-06-18

## 2021-06-18 RX ORDER — HYDROMORPHONE HYDROCHLORIDE 2 MG/ML
0.2 INJECTION, SOLUTION INTRAMUSCULAR; INTRAVENOUS; SUBCUTANEOUS EVERY 5 MIN PRN
Status: CANCELLED | OUTPATIENT
Start: 2021-06-18

## 2021-06-18 RX ORDER — DIPHENHYDRAMINE HYDROCHLORIDE 50 MG/ML
25 INJECTION INTRAMUSCULAR; INTRAVENOUS EVERY 4 HOURS PRN
Status: CANCELLED | OUTPATIENT
Start: 2021-06-18

## 2021-06-18 RX ORDER — DIPHENHYDRAMINE HCL 25 MG
25 CAPSULE ORAL EVERY 4 HOURS PRN
Status: CANCELLED | OUTPATIENT
Start: 2021-06-18

## 2021-06-18 RX ORDER — HYDROCODONE BITARTRATE AND ACETAMINOPHEN 10; 325 MG/1; MG/1
1 TABLET ORAL EVERY 4 HOURS PRN
Status: CANCELLED | OUTPATIENT
Start: 2021-06-18

## 2021-06-18 RX ORDER — PROCHLORPERAZINE EDISYLATE 5 MG/ML
5 INJECTION INTRAMUSCULAR; INTRAVENOUS EVERY 6 HOURS PRN
Status: CANCELLED | OUTPATIENT
Start: 2021-06-18

## 2021-06-18 RX ORDER — MUPIROCIN 20 MG/G
OINTMENT TOPICAL
Status: CANCELLED | OUTPATIENT
Start: 2021-06-18

## 2021-06-18 RX ORDER — LIDOCAINE HYDROCHLORIDE 10 MG/ML
0.5 INJECTION, SOLUTION EPIDURAL; INFILTRATION; INTRACAUDAL; PERINEURAL
Status: CANCELLED | OUTPATIENT
Start: 2021-06-18

## 2021-06-18 RX ORDER — LIDOCAINE HYDROCHLORIDE 10 MG/ML
1 INJECTION, SOLUTION EPIDURAL; INFILTRATION; INTRACAUDAL; PERINEURAL ONCE
Status: CANCELLED | OUTPATIENT
Start: 2021-06-18 | End: 2021-06-18

## 2021-06-18 RX ORDER — SODIUM CHLORIDE 0.9 % (FLUSH) 0.9 %
3 SYRINGE (ML) INJECTION
Status: CANCELLED | OUTPATIENT
Start: 2021-06-18

## 2021-06-18 RX ORDER — FAMOTIDINE 20 MG/1
20 TABLET, FILM COATED ORAL
Status: CANCELLED | OUTPATIENT
Start: 2021-06-18

## 2021-06-23 ENCOUNTER — HOSPITAL ENCOUNTER (OUTPATIENT)
Facility: HOSPITAL | Age: 41
Discharge: HOME | End: 2021-06-23
Attending: OBSTETRICS & GYNECOLOGY | Admitting: OBSTETRICS & GYNECOLOGY
Payer: COMMERCIAL

## 2021-06-23 ENCOUNTER — ANESTHESIA (OUTPATIENT)
Dept: SURGERY | Facility: HOSPITAL | Age: 41
End: 2021-06-23
Payer: COMMERCIAL

## 2021-06-23 VITALS
WEIGHT: 220 LBS | DIASTOLIC BLOOD PRESSURE: 64 MMHG | HEIGHT: 69 IN | SYSTOLIC BLOOD PRESSURE: 116 MMHG | BODY MASS INDEX: 32.58 KG/M2 | RESPIRATION RATE: 18 BRPM | TEMPERATURE: 98 F | HEART RATE: 72 BPM | OXYGEN SATURATION: 100 %

## 2021-06-23 DIAGNOSIS — D25.9 UTERINE LEIOMYOMA, UNSPECIFIED LOCATION: ICD-10-CM

## 2021-06-23 DIAGNOSIS — D25.1 INTRAMURAL LEIOMYOMA OF UTERUS: ICD-10-CM

## 2021-06-23 DIAGNOSIS — D25.9 UTERINE FIBROID: ICD-10-CM

## 2021-06-23 LAB — POCT GLUCOSE: 114 MG/DL (ref 70–110)

## 2021-06-23 PROCEDURE — 25000003 PHARM REV CODE 250: Performed by: OBSTETRICS & GYNECOLOGY

## 2021-06-23 PROCEDURE — 88307 TISSUE EXAM BY PATHOLOGIST: CPT | Mod: 26,,, | Performed by: PATHOLOGY

## 2021-06-23 PROCEDURE — 37000009 HC ANESTHESIA EA ADD 15 MINS: Performed by: OBSTETRICS & GYNECOLOGY

## 2021-06-23 PROCEDURE — 63600175 PHARM REV CODE 636 W HCPCS: Performed by: HEALTH CARE PROVIDER

## 2021-06-23 PROCEDURE — 71000016 HC POSTOP RECOV ADDL HR: Performed by: OBSTETRICS & GYNECOLOGY

## 2021-06-23 PROCEDURE — 36000712 HC OR TIME LEV V 1ST 15 MIN: Performed by: OBSTETRICS & GYNECOLOGY

## 2021-06-23 PROCEDURE — 71000015 HC POSTOP RECOV 1ST HR: Performed by: OBSTETRICS & GYNECOLOGY

## 2021-06-23 PROCEDURE — P9045 ALBUMIN (HUMAN), 5%, 250 ML: HCPCS | Mod: JG | Performed by: HEALTH CARE PROVIDER

## 2021-06-23 PROCEDURE — 36000713 HC OR TIME LEV V EA ADD 15 MIN: Performed by: OBSTETRICS & GYNECOLOGY

## 2021-06-23 PROCEDURE — 58571 PR LAPAROSCOPY W TOT HYSTERECTUTERUS <=250 GRAM  W TUBE/OVARY: ICD-10-PCS | Mod: ,,, | Performed by: OBSTETRICS & GYNECOLOGY

## 2021-06-23 PROCEDURE — 71000039 HC RECOVERY, EACH ADD'L HOUR: Performed by: OBSTETRICS & GYNECOLOGY

## 2021-06-23 PROCEDURE — 88307 TISSUE EXAM BY PATHOLOGIST: CPT | Performed by: PATHOLOGY

## 2021-06-23 PROCEDURE — 25000003 PHARM REV CODE 250: Performed by: NURSE PRACTITIONER

## 2021-06-23 PROCEDURE — 37000008 HC ANESTHESIA 1ST 15 MINUTES: Performed by: OBSTETRICS & GYNECOLOGY

## 2021-06-23 PROCEDURE — C2628 CATHETER, OCCLUSION: HCPCS | Performed by: OBSTETRICS & GYNECOLOGY

## 2021-06-23 PROCEDURE — 27201423 OPTIME MED/SURG SUP & DEVICES STERILE SUPPLY: Performed by: OBSTETRICS & GYNECOLOGY

## 2021-06-23 PROCEDURE — 36415 COLL VENOUS BLD VENIPUNCTURE: CPT | Performed by: OBSTETRICS & GYNECOLOGY

## 2021-06-23 PROCEDURE — 25000003 PHARM REV CODE 250: Performed by: HEALTH CARE PROVIDER

## 2021-06-23 PROCEDURE — 88307 PR  SURG PATH,LEVEL V: ICD-10-PCS | Mod: 26,,, | Performed by: PATHOLOGY

## 2021-06-23 PROCEDURE — 88309 TISSUE EXAM BY PATHOLOGIST: CPT | Performed by: PATHOLOGY

## 2021-06-23 PROCEDURE — 71000033 HC RECOVERY, INTIAL HOUR: Performed by: OBSTETRICS & GYNECOLOGY

## 2021-06-23 PROCEDURE — 58571 TLH W/T/O 250 G OR LESS: CPT | Mod: ,,, | Performed by: OBSTETRICS & GYNECOLOGY

## 2021-06-23 PROCEDURE — 63600175 PHARM REV CODE 636 W HCPCS: Performed by: NURSE PRACTITIONER

## 2021-06-23 RX ORDER — HYDROCODONE BITARTRATE AND ACETAMINOPHEN 5; 325 MG/1; MG/1
1 TABLET ORAL EVERY 4 HOURS PRN
Status: DISCONTINUED | OUTPATIENT
Start: 2021-06-23 | End: 2021-06-23 | Stop reason: HOSPADM

## 2021-06-23 RX ORDER — OXYCODONE HYDROCHLORIDE 5 MG/1
10 TABLET ORAL EVERY 4 HOURS PRN
Status: DISCONTINUED | OUTPATIENT
Start: 2021-06-23 | End: 2021-06-23 | Stop reason: HOSPADM

## 2021-06-23 RX ORDER — MUPIROCIN 20 MG/G
OINTMENT TOPICAL
Status: COMPLETED | OUTPATIENT
Start: 2021-06-23 | End: 2021-06-23

## 2021-06-23 RX ORDER — CELECOXIB 100 MG/1
400 CAPSULE ORAL
Status: COMPLETED | OUTPATIENT
Start: 2021-06-23 | End: 2021-06-23

## 2021-06-23 RX ORDER — ONDANSETRON 8 MG/1
8 TABLET, ORALLY DISINTEGRATING ORAL EVERY 8 HOURS PRN
Status: DISCONTINUED | OUTPATIENT
Start: 2021-06-23 | End: 2021-06-23 | Stop reason: HOSPADM

## 2021-06-23 RX ORDER — FENTANYL CITRATE 50 UG/ML
INJECTION, SOLUTION INTRAMUSCULAR; INTRAVENOUS
Status: DISCONTINUED | OUTPATIENT
Start: 2021-06-23 | End: 2021-06-23

## 2021-06-23 RX ORDER — HYDROMORPHONE HYDROCHLORIDE 2 MG/ML
1 INJECTION, SOLUTION INTRAMUSCULAR; INTRAVENOUS; SUBCUTANEOUS EVERY 4 HOURS PRN
Status: DISCONTINUED | OUTPATIENT
Start: 2021-06-23 | End: 2021-06-23 | Stop reason: HOSPADM

## 2021-06-23 RX ORDER — SODIUM CHLORIDE 0.9 % (FLUSH) 0.9 %
10 SYRINGE (ML) INJECTION
Status: DISCONTINUED | OUTPATIENT
Start: 2021-06-23 | End: 2021-06-23 | Stop reason: HOSPADM

## 2021-06-23 RX ORDER — ACETAMINOPHEN 325 MG/1
650 TABLET ORAL EVERY 4 HOURS PRN
Status: DISCONTINUED | OUTPATIENT
Start: 2021-06-23 | End: 2021-06-23 | Stop reason: HOSPADM

## 2021-06-23 RX ORDER — SODIUM CHLORIDE, SODIUM LACTATE, POTASSIUM CHLORIDE, CALCIUM CHLORIDE 600; 310; 30; 20 MG/100ML; MG/100ML; MG/100ML; MG/100ML
INJECTION, SOLUTION INTRAVENOUS CONTINUOUS
Status: DISCONTINUED | OUTPATIENT
Start: 2021-06-23 | End: 2021-06-23 | Stop reason: HOSPADM

## 2021-06-23 RX ORDER — SCOLOPAMINE TRANSDERMAL SYSTEM 1 MG/1
1 PATCH, EXTENDED RELEASE TRANSDERMAL
Status: DISCONTINUED | OUTPATIENT
Start: 2021-06-23 | End: 2021-06-23 | Stop reason: HOSPADM

## 2021-06-23 RX ORDER — SODIUM CHLORIDE 0.9 % (FLUSH) 0.9 %
3 SYRINGE (ML) INJECTION
Status: DISCONTINUED | OUTPATIENT
Start: 2021-06-23 | End: 2021-06-23 | Stop reason: HOSPADM

## 2021-06-23 RX ORDER — KETOROLAC TROMETHAMINE 30 MG/ML
INJECTION, SOLUTION INTRAMUSCULAR; INTRAVENOUS
Status: DISCONTINUED | OUTPATIENT
Start: 2021-06-23 | End: 2021-06-23

## 2021-06-23 RX ORDER — SODIUM CHLORIDE 9 MG/ML
INJECTION, SOLUTION INTRAVENOUS CONTINUOUS PRN
Status: DISCONTINUED | OUTPATIENT
Start: 2021-06-23 | End: 2021-06-23

## 2021-06-23 RX ORDER — DIPHENHYDRAMINE HYDROCHLORIDE 50 MG/ML
25 INJECTION INTRAMUSCULAR; INTRAVENOUS EVERY 4 HOURS PRN
Status: DISCONTINUED | OUTPATIENT
Start: 2021-06-23 | End: 2021-06-23 | Stop reason: HOSPADM

## 2021-06-23 RX ORDER — DIPHENHYDRAMINE HCL 25 MG
25 CAPSULE ORAL EVERY 4 HOURS PRN
Status: DISCONTINUED | OUTPATIENT
Start: 2021-06-23 | End: 2021-06-23 | Stop reason: HOSPADM

## 2021-06-23 RX ORDER — ONDANSETRON 2 MG/ML
4 INJECTION INTRAMUSCULAR; INTRAVENOUS DAILY PRN
Status: DISCONTINUED | OUTPATIENT
Start: 2021-06-23 | End: 2021-06-23 | Stop reason: HOSPADM

## 2021-06-23 RX ORDER — NEOSTIGMINE METHYLSULFATE 1 MG/ML
INJECTION, SOLUTION INTRAVENOUS
Status: DISCONTINUED | OUTPATIENT
Start: 2021-06-23 | End: 2021-06-23

## 2021-06-23 RX ORDER — MIDAZOLAM HYDROCHLORIDE 1 MG/ML
INJECTION INTRAMUSCULAR; INTRAVENOUS
Status: DISCONTINUED | OUTPATIENT
Start: 2021-06-23 | End: 2021-06-23

## 2021-06-23 RX ORDER — IBUPROFEN 800 MG/1
800 TABLET ORAL EVERY 8 HOURS PRN
Qty: 30 TABLET | Refills: 0 | Status: SHIPPED | OUTPATIENT
Start: 2021-06-23 | End: 2022-09-21

## 2021-06-23 RX ORDER — IBUPROFEN 400 MG/1
800 TABLET ORAL
Status: DISCONTINUED | OUTPATIENT
Start: 2021-06-24 | End: 2021-06-23 | Stop reason: HOSPADM

## 2021-06-23 RX ORDER — FAMOTIDINE 20 MG/1
20 TABLET, FILM COATED ORAL
Status: COMPLETED | OUTPATIENT
Start: 2021-06-23 | End: 2021-06-23

## 2021-06-23 RX ORDER — METOCLOPRAMIDE HYDROCHLORIDE 5 MG/ML
10 INJECTION INTRAMUSCULAR; INTRAVENOUS EVERY 10 MIN PRN
Status: ACTIVE | OUTPATIENT
Start: 2021-06-23 | End: 2021-06-23

## 2021-06-23 RX ORDER — HYDROMORPHONE HYDROCHLORIDE 2 MG/ML
0.2 INJECTION, SOLUTION INTRAMUSCULAR; INTRAVENOUS; SUBCUTANEOUS EVERY 5 MIN PRN
Status: DISCONTINUED | OUTPATIENT
Start: 2021-06-23 | End: 2021-06-23 | Stop reason: HOSPADM

## 2021-06-23 RX ORDER — PROPOFOL 10 MG/ML
VIAL (ML) INTRAVENOUS
Status: DISCONTINUED | OUTPATIENT
Start: 2021-06-23 | End: 2021-06-23

## 2021-06-23 RX ORDER — LIDOCAINE HYDROCHLORIDE 10 MG/ML
0.5 INJECTION, SOLUTION EPIDURAL; INFILTRATION; INTRACAUDAL; PERINEURAL
Status: DISCONTINUED | OUTPATIENT
Start: 2021-06-23 | End: 2021-06-23 | Stop reason: HOSPADM

## 2021-06-23 RX ORDER — DEXAMETHASONE SODIUM PHOSPHATE 4 MG/ML
INJECTION, SOLUTION INTRA-ARTICULAR; INTRALESIONAL; INTRAMUSCULAR; INTRAVENOUS; SOFT TISSUE
Status: DISCONTINUED | OUTPATIENT
Start: 2021-06-23 | End: 2021-06-23

## 2021-06-23 RX ORDER — HYDROCODONE BITARTRATE AND ACETAMINOPHEN 5; 325 MG/1; MG/1
1 TABLET ORAL EVERY 6 HOURS PRN
Qty: 20 TABLET | Refills: 0 | Status: SHIPPED | OUTPATIENT
Start: 2021-06-23 | End: 2021-06-30

## 2021-06-23 RX ORDER — AMOXICILLIN 250 MG
1 CAPSULE ORAL 2 TIMES DAILY
Status: DISCONTINUED | OUTPATIENT
Start: 2021-06-23 | End: 2021-06-23 | Stop reason: HOSPADM

## 2021-06-23 RX ORDER — CEFAZOLIN SODIUM 2 G/50ML
2 SOLUTION INTRAVENOUS
Status: DISCONTINUED | OUTPATIENT
Start: 2021-06-23 | End: 2021-06-23 | Stop reason: HOSPADM

## 2021-06-23 RX ORDER — ACETAMINOPHEN 10 MG/ML
INJECTION, SOLUTION INTRAVENOUS
Status: DISCONTINUED | OUTPATIENT
Start: 2021-06-23 | End: 2021-06-23

## 2021-06-23 RX ORDER — PROCHLORPERAZINE EDISYLATE 5 MG/ML
5 INJECTION INTRAMUSCULAR; INTRAVENOUS EVERY 6 HOURS PRN
Status: DISCONTINUED | OUTPATIENT
Start: 2021-06-23 | End: 2021-06-23 | Stop reason: HOSPADM

## 2021-06-23 RX ORDER — ALBUMIN HUMAN 50 G/1000ML
SOLUTION INTRAVENOUS CONTINUOUS PRN
Status: DISCONTINUED | OUTPATIENT
Start: 2021-06-23 | End: 2021-06-23

## 2021-06-23 RX ORDER — KETOROLAC TROMETHAMINE 30 MG/ML
30 INJECTION, SOLUTION INTRAMUSCULAR; INTRAVENOUS
Status: DISCONTINUED | OUTPATIENT
Start: 2021-06-23 | End: 2021-06-23 | Stop reason: HOSPADM

## 2021-06-23 RX ORDER — SUCCINYLCHOLINE CHLORIDE 20 MG/ML
INJECTION INTRAMUSCULAR; INTRAVENOUS
Status: DISCONTINUED | OUTPATIENT
Start: 2021-06-23 | End: 2021-06-23

## 2021-06-23 RX ORDER — SODIUM CHLORIDE, SODIUM LACTATE, POTASSIUM CHLORIDE, CALCIUM CHLORIDE 600; 310; 30; 20 MG/100ML; MG/100ML; MG/100ML; MG/100ML
INJECTION, SOLUTION INTRAVENOUS CONTINUOUS PRN
Status: DISCONTINUED | OUTPATIENT
Start: 2021-06-23 | End: 2021-06-23

## 2021-06-23 RX ORDER — ONDANSETRON 2 MG/ML
INJECTION INTRAMUSCULAR; INTRAVENOUS
Status: DISCONTINUED | OUTPATIENT
Start: 2021-06-23 | End: 2021-06-23

## 2021-06-23 RX ORDER — POLYETHYLENE GLYCOL 3350 17 G/17G
17 POWDER, FOR SOLUTION ORAL DAILY
Status: DISCONTINUED | OUTPATIENT
Start: 2021-06-23 | End: 2021-06-23 | Stop reason: HOSPADM

## 2021-06-23 RX ORDER — CEFAZOLIN SODIUM 1 G/3ML
INJECTION, POWDER, FOR SOLUTION INTRAMUSCULAR; INTRAVENOUS
Status: DISCONTINUED | OUTPATIENT
Start: 2021-06-23 | End: 2021-06-23

## 2021-06-23 RX ORDER — OXYCODONE HYDROCHLORIDE 5 MG/1
5 TABLET ORAL
Status: DISCONTINUED | OUTPATIENT
Start: 2021-06-23 | End: 2021-06-23 | Stop reason: HOSPADM

## 2021-06-23 RX ORDER — HYDROMORPHONE HYDROCHLORIDE 2 MG/ML
0.5 INJECTION, SOLUTION INTRAMUSCULAR; INTRAVENOUS; SUBCUTANEOUS EVERY 5 MIN PRN
Status: DISPENSED | OUTPATIENT
Start: 2021-06-23 | End: 2021-06-23

## 2021-06-23 RX ORDER — PROCHLORPERAZINE EDISYLATE 5 MG/ML
5 INJECTION INTRAMUSCULAR; INTRAVENOUS EVERY 10 MIN PRN
Status: DISCONTINUED | OUTPATIENT
Start: 2021-06-23 | End: 2021-06-23 | Stop reason: HOSPADM

## 2021-06-23 RX ORDER — ONDANSETRON 2 MG/ML
4 INJECTION INTRAMUSCULAR; INTRAVENOUS DAILY PRN
Status: DISCONTINUED | OUTPATIENT
Start: 2021-06-23 | End: 2021-06-23 | Stop reason: SDUPTHER

## 2021-06-23 RX ORDER — PREGABALIN 50 MG/1
50 CAPSULE ORAL
Status: COMPLETED | OUTPATIENT
Start: 2021-06-23 | End: 2021-06-23

## 2021-06-23 RX ORDER — LIDOCAINE HYDROCHLORIDE 10 MG/ML
1 INJECTION, SOLUTION EPIDURAL; INFILTRATION; INTRACAUDAL; PERINEURAL ONCE
Status: DISCONTINUED | OUTPATIENT
Start: 2021-06-23 | End: 2021-06-23 | Stop reason: HOSPADM

## 2021-06-23 RX ORDER — ACETAMINOPHEN 500 MG
1000 TABLET ORAL
Status: COMPLETED | OUTPATIENT
Start: 2021-06-23 | End: 2021-06-23

## 2021-06-23 RX ORDER — LIDOCAINE HCL/PF 100 MG/5ML
SYRINGE (ML) INTRAVENOUS
Status: DISCONTINUED | OUTPATIENT
Start: 2021-06-23 | End: 2021-06-23

## 2021-06-23 RX ORDER — OXYCODONE HYDROCHLORIDE 5 MG/1
5 TABLET ORAL
Status: DISCONTINUED | OUTPATIENT
Start: 2021-06-23 | End: 2021-06-23 | Stop reason: SDUPTHER

## 2021-06-23 RX ORDER — HYDROCODONE BITARTRATE AND ACETAMINOPHEN 10; 325 MG/1; MG/1
1 TABLET ORAL EVERY 4 HOURS PRN
Status: DISCONTINUED | OUTPATIENT
Start: 2021-06-23 | End: 2021-06-23 | Stop reason: HOSPADM

## 2021-06-23 RX ORDER — ROCURONIUM BROMIDE 10 MG/ML
INJECTION, SOLUTION INTRAVENOUS
Status: DISCONTINUED | OUTPATIENT
Start: 2021-06-23 | End: 2021-06-23

## 2021-06-23 RX ORDER — MEPERIDINE HYDROCHLORIDE 50 MG/ML
12.5 INJECTION INTRAMUSCULAR; INTRAVENOUS; SUBCUTANEOUS ONCE AS NEEDED
Status: DISCONTINUED | OUTPATIENT
Start: 2021-06-23 | End: 2021-06-23 | Stop reason: HOSPADM

## 2021-06-23 RX ADMIN — ACETAMINOPHEN 1000 MG: 10 INJECTION, SOLUTION INTRAVENOUS at 09:06

## 2021-06-23 RX ADMIN — HYDROCODONE BITARTRATE AND ACETAMINOPHEN 1 TABLET: 10; 325 TABLET ORAL at 12:06

## 2021-06-23 RX ADMIN — HYDROMORPHONE HYDROCHLORIDE 0.5 MG: 2 INJECTION INTRAMUSCULAR; INTRAVENOUS; SUBCUTANEOUS at 11:06

## 2021-06-23 RX ADMIN — SUCCINYLCHOLINE CHLORIDE 160 MG: 20 INJECTION, SOLUTION INTRAMUSCULAR; INTRAVENOUS at 08:06

## 2021-06-23 RX ADMIN — SODIUM CHLORIDE: 0.9 INJECTION, SOLUTION INTRAVENOUS at 08:06

## 2021-06-23 RX ADMIN — MUPIROCIN: 20 OINTMENT TOPICAL at 07:06

## 2021-06-23 RX ADMIN — ACETAMINOPHEN 650 MG: 325 TABLET ORAL at 02:06

## 2021-06-23 RX ADMIN — ROCURONIUM BROMIDE 10 MG: 10 INJECTION, SOLUTION INTRAVENOUS at 08:06

## 2021-06-23 RX ADMIN — DEXAMETHASONE SODIUM PHOSPHATE 4 MG: 4 INJECTION, SOLUTION INTRA-ARTICULAR; INTRALESIONAL; INTRAMUSCULAR; INTRAVENOUS; SOFT TISSUE at 08:06

## 2021-06-23 RX ADMIN — ROCURONIUM BROMIDE 10 MG: 10 INJECTION, SOLUTION INTRAVENOUS at 09:06

## 2021-06-23 RX ADMIN — GLYCOPYRROLATE 0.8 MG: 0.2 INJECTION, SOLUTION INTRAMUSCULAR; INTRAVITREAL at 10:06

## 2021-06-23 RX ADMIN — ACETAMINOPHEN 1000 MG: 500 TABLET ORAL at 07:06

## 2021-06-23 RX ADMIN — KETOROLAC TROMETHAMINE 30 MG: 30 INJECTION, SOLUTION INTRAMUSCULAR; INTRAVENOUS at 10:06

## 2021-06-23 RX ADMIN — FENTANYL CITRATE 100 MCG: 50 INJECTION, SOLUTION INTRAMUSCULAR; INTRAVENOUS at 08:06

## 2021-06-23 RX ADMIN — SODIUM CHLORIDE, SODIUM LACTATE, POTASSIUM CHLORIDE, AND CALCIUM CHLORIDE: .6; .31; .03; .02 INJECTION, SOLUTION INTRAVENOUS at 07:06

## 2021-06-23 RX ADMIN — FAMOTIDINE 20 MG: 20 TABLET ORAL at 07:06

## 2021-06-23 RX ADMIN — SCOPALAMINE 1 PATCH: 1 PATCH, EXTENDED RELEASE TRANSDERMAL at 07:06

## 2021-06-23 RX ADMIN — LIDOCAINE HYDROCHLORIDE 100 MG: 20 INJECTION, SOLUTION INTRAVENOUS at 08:06

## 2021-06-23 RX ADMIN — PROPOFOL 150 MG: 10 INJECTION, EMULSION INTRAVENOUS at 08:06

## 2021-06-23 RX ADMIN — ALBUMIN (HUMAN): 12.5 SOLUTION INTRAVENOUS at 09:06

## 2021-06-23 RX ADMIN — CELECOXIB 400 MG: 100 CAPSULE ORAL at 07:06

## 2021-06-23 RX ADMIN — NEOSTIGMINE METHYLSULFATE 5 MG: 1 INJECTION INTRAVENOUS at 10:06

## 2021-06-23 RX ADMIN — ROCURONIUM BROMIDE 30 MG: 10 INJECTION, SOLUTION INTRAVENOUS at 08:06

## 2021-06-23 RX ADMIN — MIDAZOLAM HYDROCHLORIDE 2 MG: 1 INJECTION, SOLUTION INTRAMUSCULAR; INTRAVENOUS at 07:06

## 2021-06-23 RX ADMIN — PROPOFOL 20 MG: 10 INJECTION, EMULSION INTRAVENOUS at 10:06

## 2021-06-23 RX ADMIN — ONDANSETRON 4 MG: 2 INJECTION, SOLUTION INTRAMUSCULAR; INTRAVENOUS at 10:06

## 2021-06-23 RX ADMIN — ONDANSETRON 8 MG: 8 TABLET, ORALLY DISINTEGRATING ORAL at 03:06

## 2021-06-23 RX ADMIN — SODIUM CHLORIDE, SODIUM LACTATE, POTASSIUM CHLORIDE, AND CALCIUM CHLORIDE: .6; .31; .03; .02 INJECTION, SOLUTION INTRAVENOUS at 08:06

## 2021-06-23 RX ADMIN — CEFAZOLIN 2 G: 330 INJECTION, POWDER, FOR SOLUTION INTRAMUSCULAR; INTRAVENOUS at 08:06

## 2021-06-23 RX ADMIN — ROCURONIUM BROMIDE 20 MG: 10 INJECTION, SOLUTION INTRAVENOUS at 09:06

## 2021-06-23 RX ADMIN — PREGABALIN 50 MG: 50 CAPSULE ORAL at 07:06

## 2021-06-24 ENCOUNTER — PATIENT MESSAGE (OUTPATIENT)
Dept: OBSTETRICS AND GYNECOLOGY | Facility: CLINIC | Age: 41
End: 2021-06-24

## 2021-07-01 ENCOUNTER — TELEPHONE (OUTPATIENT)
Dept: OBSTETRICS AND GYNECOLOGY | Facility: CLINIC | Age: 41
End: 2021-07-01

## 2021-07-01 ENCOUNTER — PATIENT MESSAGE (OUTPATIENT)
Dept: OBSTETRICS AND GYNECOLOGY | Facility: CLINIC | Age: 41
End: 2021-07-01

## 2021-07-01 LAB
FINAL PATHOLOGIC DIAGNOSIS: NORMAL
GROSS: NORMAL
Lab: NORMAL

## 2021-07-09 ENCOUNTER — OFFICE VISIT (OUTPATIENT)
Dept: OBSTETRICS AND GYNECOLOGY | Facility: CLINIC | Age: 41
End: 2021-07-09
Payer: COMMERCIAL

## 2021-07-09 VITALS
SYSTOLIC BLOOD PRESSURE: 122 MMHG | WEIGHT: 219.81 LBS | DIASTOLIC BLOOD PRESSURE: 78 MMHG | BODY MASS INDEX: 32.46 KG/M2

## 2021-07-09 DIAGNOSIS — Z98.890 POST-OPERATIVE STATE: Primary | ICD-10-CM

## 2021-07-09 PROCEDURE — 3008F PR BODY MASS INDEX (BMI) DOCUMENTED: ICD-10-PCS | Mod: CPTII,S$GLB,, | Performed by: OBSTETRICS & GYNECOLOGY

## 2021-07-09 PROCEDURE — 99999 PR PBB SHADOW E&M-EST. PATIENT-LVL II: CPT | Mod: PBBFAC,,, | Performed by: OBSTETRICS & GYNECOLOGY

## 2021-07-09 PROCEDURE — 1126F AMNT PAIN NOTED NONE PRSNT: CPT | Mod: S$GLB,,, | Performed by: OBSTETRICS & GYNECOLOGY

## 2021-07-09 PROCEDURE — 99999 PR PBB SHADOW E&M-EST. PATIENT-LVL II: ICD-10-PCS | Mod: PBBFAC,,, | Performed by: OBSTETRICS & GYNECOLOGY

## 2021-07-09 PROCEDURE — 99024 PR POST-OP FOLLOW-UP VISIT: ICD-10-PCS | Mod: S$GLB,,, | Performed by: OBSTETRICS & GYNECOLOGY

## 2021-07-09 PROCEDURE — 1126F PR PAIN SEVERITY QUANTIFIED, NO PAIN PRESENT: ICD-10-PCS | Mod: S$GLB,,, | Performed by: OBSTETRICS & GYNECOLOGY

## 2021-07-09 PROCEDURE — 3008F BODY MASS INDEX DOCD: CPT | Mod: CPTII,S$GLB,, | Performed by: OBSTETRICS & GYNECOLOGY

## 2021-07-09 PROCEDURE — 99024 POSTOP FOLLOW-UP VISIT: CPT | Mod: S$GLB,,, | Performed by: OBSTETRICS & GYNECOLOGY

## 2021-07-26 ENCOUNTER — PATIENT MESSAGE (OUTPATIENT)
Dept: OBSTETRICS AND GYNECOLOGY | Facility: CLINIC | Age: 41
End: 2021-07-26

## 2021-08-02 ENCOUNTER — OFFICE VISIT (OUTPATIENT)
Dept: OBSTETRICS AND GYNECOLOGY | Facility: CLINIC | Age: 41
End: 2021-08-02
Payer: COMMERCIAL

## 2021-08-02 VITALS — BODY MASS INDEX: 32.1 KG/M2 | SYSTOLIC BLOOD PRESSURE: 110 MMHG | DIASTOLIC BLOOD PRESSURE: 82 MMHG | WEIGHT: 217.38 LBS

## 2021-08-02 DIAGNOSIS — Z98.890 POST-OPERATIVE STATE: Primary | ICD-10-CM

## 2021-08-02 DIAGNOSIS — N89.8 VAGINAL DISCHARGE: ICD-10-CM

## 2021-08-02 LAB
BILIRUB SERPL-MCNC: ABNORMAL MG/DL
BLOOD URINE, POC: ABNORMAL
CLARITY, POC UA: ABNORMAL
COLOR, POC UA: ABNORMAL
GLUCOSE UR QL STRIP: NEGATIVE
KETONES UR QL STRIP: ABNORMAL
LEUKOCYTE ESTERASE URINE, POC: ABNORMAL
NITRITE, POC UA: NEGATIVE
PH, POC UA: ABNORMAL
PROTEIN, POC: ABNORMAL
SPECIFIC GRAVITY, POC UA: ABNORMAL
UROBILINOGEN, POC UA: ABNORMAL

## 2021-08-02 PROCEDURE — 99024 POSTOP FOLLOW-UP VISIT: CPT | Mod: S$GLB,,, | Performed by: OBSTETRICS & GYNECOLOGY

## 2021-08-02 PROCEDURE — 1126F AMNT PAIN NOTED NONE PRSNT: CPT | Mod: CPTII,S$GLB,, | Performed by: OBSTETRICS & GYNECOLOGY

## 2021-08-02 PROCEDURE — 3008F PR BODY MASS INDEX (BMI) DOCUMENTED: ICD-10-PCS | Mod: CPTII,S$GLB,, | Performed by: OBSTETRICS & GYNECOLOGY

## 2021-08-02 PROCEDURE — 87088 URINE BACTERIA CULTURE: CPT | Performed by: OBSTETRICS & GYNECOLOGY

## 2021-08-02 PROCEDURE — 99999 PR PBB SHADOW E&M-EST. PATIENT-LVL II: CPT | Mod: PBBFAC,,, | Performed by: OBSTETRICS & GYNECOLOGY

## 2021-08-02 PROCEDURE — 3074F SYST BP LT 130 MM HG: CPT | Mod: CPTII,S$GLB,, | Performed by: OBSTETRICS & GYNECOLOGY

## 2021-08-02 PROCEDURE — 99999 PR PBB SHADOW E&M-EST. PATIENT-LVL II: ICD-10-PCS | Mod: PBBFAC,,, | Performed by: OBSTETRICS & GYNECOLOGY

## 2021-08-02 PROCEDURE — 81002 POCT URINE DIPSTICK WITHOUT MICROSCOPE: ICD-10-PCS | Mod: S$GLB,,, | Performed by: OBSTETRICS & GYNECOLOGY

## 2021-08-02 PROCEDURE — 87086 URINE CULTURE/COLONY COUNT: CPT | Performed by: OBSTETRICS & GYNECOLOGY

## 2021-08-02 PROCEDURE — 87186 SC STD MICRODIL/AGAR DIL: CPT | Performed by: OBSTETRICS & GYNECOLOGY

## 2021-08-02 PROCEDURE — 1159F PR MEDICATION LIST DOCUMENTED IN MEDICAL RECORD: ICD-10-PCS | Mod: CPTII,S$GLB,, | Performed by: OBSTETRICS & GYNECOLOGY

## 2021-08-02 PROCEDURE — 3074F PR MOST RECENT SYSTOLIC BLOOD PRESSURE < 130 MM HG: ICD-10-PCS | Mod: CPTII,S$GLB,, | Performed by: OBSTETRICS & GYNECOLOGY

## 2021-08-02 PROCEDURE — 1126F PR PAIN SEVERITY QUANTIFIED, NO PAIN PRESENT: ICD-10-PCS | Mod: CPTII,S$GLB,, | Performed by: OBSTETRICS & GYNECOLOGY

## 2021-08-02 PROCEDURE — 3079F PR MOST RECENT DIASTOLIC BLOOD PRESSURE 80-89 MM HG: ICD-10-PCS | Mod: CPTII,S$GLB,, | Performed by: OBSTETRICS & GYNECOLOGY

## 2021-08-02 PROCEDURE — 81002 URINALYSIS NONAUTO W/O SCOPE: CPT | Mod: S$GLB,,, | Performed by: OBSTETRICS & GYNECOLOGY

## 2021-08-02 PROCEDURE — 87077 CULTURE AEROBIC IDENTIFY: CPT | Performed by: OBSTETRICS & GYNECOLOGY

## 2021-08-02 PROCEDURE — 99024 PR POST-OP FOLLOW-UP VISIT: ICD-10-PCS | Mod: S$GLB,,, | Performed by: OBSTETRICS & GYNECOLOGY

## 2021-08-02 PROCEDURE — 1159F MED LIST DOCD IN RCRD: CPT | Mod: CPTII,S$GLB,, | Performed by: OBSTETRICS & GYNECOLOGY

## 2021-08-02 PROCEDURE — 87481 CANDIDA DNA AMP PROBE: CPT | Mod: 59 | Performed by: OBSTETRICS & GYNECOLOGY

## 2021-08-02 PROCEDURE — 3008F BODY MASS INDEX DOCD: CPT | Mod: CPTII,S$GLB,, | Performed by: OBSTETRICS & GYNECOLOGY

## 2021-08-02 PROCEDURE — 3079F DIAST BP 80-89 MM HG: CPT | Mod: CPTII,S$GLB,, | Performed by: OBSTETRICS & GYNECOLOGY

## 2021-08-02 RX ORDER — METRONIDAZOLE 500 MG/1
500 TABLET ORAL EVERY 12 HOURS
Qty: 14 TABLET | Refills: 0 | Status: SHIPPED | OUTPATIENT
Start: 2021-08-02 | End: 2021-08-09

## 2021-08-04 RX ORDER — NITROFURANTOIN 25; 75 MG/1; MG/1
100 CAPSULE ORAL 2 TIMES DAILY
Qty: 14 CAPSULE | Refills: 0 | Status: SHIPPED | OUTPATIENT
Start: 2021-08-04 | End: 2021-08-11

## 2021-08-05 LAB
BACTERIA UR CULT: ABNORMAL
BACTERIAL VAGINOSIS DNA: NEGATIVE
CANDIDA GLABRATA DNA: NEGATIVE
CANDIDA KRUSEI DNA: NEGATIVE
CANDIDA RRNA VAG QL PROBE: NEGATIVE
T VAGINALIS RRNA GENITAL QL PROBE: NEGATIVE

## 2021-09-02 ENCOUNTER — PATIENT MESSAGE (OUTPATIENT)
Dept: OBSTETRICS AND GYNECOLOGY | Facility: CLINIC | Age: 41
End: 2021-09-02

## 2021-09-08 RX ORDER — SULFAMETHOXAZOLE AND TRIMETHOPRIM 800; 160 MG/1; MG/1
1 TABLET ORAL 2 TIMES DAILY
Qty: 6 TABLET | Refills: 0 | Status: SHIPPED | OUTPATIENT
Start: 2021-09-08 | End: 2021-09-11

## 2021-09-20 ENCOUNTER — TELEPHONE (OUTPATIENT)
Dept: ADMINISTRATIVE | Facility: OTHER | Age: 41
End: 2021-09-20

## 2021-12-07 ENCOUNTER — OFFICE VISIT (OUTPATIENT)
Dept: FAMILY MEDICINE | Facility: CLINIC | Age: 41
End: 2021-12-07
Payer: COMMERCIAL

## 2021-12-07 VITALS
WEIGHT: 215.81 LBS | HEART RATE: 77 BPM | OXYGEN SATURATION: 99 % | SYSTOLIC BLOOD PRESSURE: 128 MMHG | DIASTOLIC BLOOD PRESSURE: 84 MMHG | BODY MASS INDEX: 31.96 KG/M2 | HEIGHT: 69 IN | TEMPERATURE: 99 F

## 2021-12-07 DIAGNOSIS — J06.9 URTI (ACUTE UPPER RESPIRATORY INFECTION): Primary | ICD-10-CM

## 2021-12-07 LAB
CTP QC/QA: YES
FLUAV AG NPH QL: NEGATIVE
FLUBV AG NPH QL: NEGATIVE
S PYO RRNA THROAT QL PROBE: NEGATIVE
SARS-COV-2 RDRP RESP QL NAA+PROBE: NEGATIVE

## 2021-12-07 PROCEDURE — 99999 PR PBB SHADOW E&M-EST. PATIENT-LVL III: ICD-10-PCS | Mod: PBBFAC,,, | Performed by: INTERNAL MEDICINE

## 2021-12-07 PROCEDURE — 87804 INFLUENZA ASSAY W/OPTIC: CPT | Mod: QW,S$GLB,, | Performed by: INTERNAL MEDICINE

## 2021-12-07 PROCEDURE — 99999 PR PBB SHADOW E&M-EST. PATIENT-LVL III: CPT | Mod: PBBFAC,,, | Performed by: INTERNAL MEDICINE

## 2021-12-07 PROCEDURE — 87880 STREP A ASSAY W/OPTIC: CPT | Mod: QW,S$GLB,, | Performed by: INTERNAL MEDICINE

## 2021-12-07 PROCEDURE — 99214 PR OFFICE/OUTPT VISIT, EST, LEVL IV, 30-39 MIN: ICD-10-PCS | Mod: S$GLB,,, | Performed by: INTERNAL MEDICINE

## 2021-12-07 PROCEDURE — 99214 OFFICE O/P EST MOD 30 MIN: CPT | Mod: S$GLB,,, | Performed by: INTERNAL MEDICINE

## 2021-12-07 PROCEDURE — 87880 POCT RAPID STREP A: ICD-10-PCS | Mod: QW,S$GLB,, | Performed by: INTERNAL MEDICINE

## 2021-12-07 PROCEDURE — U0002: ICD-10-PCS | Mod: QW,S$GLB,, | Performed by: INTERNAL MEDICINE

## 2021-12-07 PROCEDURE — 87804 POCT INFLUENZA A/B: ICD-10-PCS | Mod: QW,S$GLB,, | Performed by: INTERNAL MEDICINE

## 2021-12-07 PROCEDURE — U0002 COVID-19 LAB TEST NON-CDC: HCPCS | Mod: QW,S$GLB,, | Performed by: INTERNAL MEDICINE

## 2021-12-07 PROCEDURE — 87070 CULTURE OTHR SPECIMN AEROBIC: CPT | Performed by: INTERNAL MEDICINE

## 2021-12-07 RX ORDER — FLUTICASONE PROPIONATE 50 MCG
1 SPRAY, SUSPENSION (ML) NASAL DAILY
Qty: 16 G | Refills: 2 | Status: SHIPPED | OUTPATIENT
Start: 2021-12-07 | End: 2022-03-28

## 2021-12-07 RX ORDER — GUAIFENESIN AND DEXTROMETHORPHAN HYDROBROMIDE 600; 30 MG/1; MG/1
1 TABLET, EXTENDED RELEASE ORAL 2 TIMES DAILY
Qty: 14 TABLET | Refills: 0 | Status: SHIPPED | OUTPATIENT
Start: 2021-12-07 | End: 2021-12-14

## 2021-12-07 RX ORDER — AZITHROMYCIN 250 MG/1
TABLET, FILM COATED ORAL
Qty: 6 TABLET | Refills: 0 | Status: SHIPPED | OUTPATIENT
Start: 2021-12-07 | End: 2021-12-12

## 2021-12-10 LAB — BACTERIA THROAT CULT: NORMAL

## 2021-12-23 ENCOUNTER — OFFICE VISIT (OUTPATIENT)
Dept: FAMILY MEDICINE | Facility: CLINIC | Age: 41
End: 2021-12-23
Payer: COMMERCIAL

## 2021-12-23 ENCOUNTER — PATIENT MESSAGE (OUTPATIENT)
Dept: FAMILY MEDICINE | Facility: CLINIC | Age: 41
End: 2021-12-23

## 2021-12-23 DIAGNOSIS — J39.9 UPPER RESPIRATORY DISEASE: Primary | ICD-10-CM

## 2021-12-23 DIAGNOSIS — U07.1 COVID-19: ICD-10-CM

## 2021-12-23 LAB
CTP QC/QA: YES
SARS-COV-2 RDRP RESP QL NAA+PROBE: POSITIVE

## 2021-12-23 PROCEDURE — 1159F MED LIST DOCD IN RCRD: CPT | Mod: CPTII,S$GLB,, | Performed by: FAMILY MEDICINE

## 2021-12-23 PROCEDURE — 1159F PR MEDICATION LIST DOCUMENTED IN MEDICAL RECORD: ICD-10-PCS | Mod: CPTII,S$GLB,, | Performed by: FAMILY MEDICINE

## 2021-12-23 PROCEDURE — 99999 PR PBB SHADOW E&M-EST. PATIENT-LVL III: CPT | Mod: PBBFAC,,, | Performed by: FAMILY MEDICINE

## 2021-12-23 PROCEDURE — 99214 OFFICE O/P EST MOD 30 MIN: CPT | Mod: S$GLB,,, | Performed by: FAMILY MEDICINE

## 2021-12-23 PROCEDURE — U0002: ICD-10-PCS | Mod: QW,S$GLB,, | Performed by: FAMILY MEDICINE

## 2021-12-23 PROCEDURE — 99999 PR PBB SHADOW E&M-EST. PATIENT-LVL III: ICD-10-PCS | Mod: PBBFAC,,, | Performed by: FAMILY MEDICINE

## 2021-12-23 PROCEDURE — 99214 PR OFFICE/OUTPT VISIT, EST, LEVL IV, 30-39 MIN: ICD-10-PCS | Mod: S$GLB,,, | Performed by: FAMILY MEDICINE

## 2021-12-23 PROCEDURE — U0002 COVID-19 LAB TEST NON-CDC: HCPCS | Mod: QW,S$GLB,, | Performed by: FAMILY MEDICINE

## 2021-12-23 RX ORDER — BENZONATATE 100 MG/1
100 CAPSULE ORAL 3 TIMES DAILY PRN
Qty: 30 CAPSULE | Refills: 0 | Status: SHIPPED | OUTPATIENT
Start: 2021-12-23 | End: 2022-01-02

## 2021-12-23 RX ORDER — ALBUTEROL SULFATE 90 UG/1
2 AEROSOL, METERED RESPIRATORY (INHALATION) EVERY 6 HOURS PRN
Qty: 18 G | Refills: 0 | Status: SHIPPED | OUTPATIENT
Start: 2021-12-23 | End: 2022-03-28

## 2021-12-23 RX ORDER — PREDNISONE 20 MG/1
20 TABLET ORAL DAILY
Qty: 5 TABLET | Refills: 0 | Status: SHIPPED | OUTPATIENT
Start: 2021-12-23 | End: 2022-03-28

## 2021-12-23 RX ORDER — AZITHROMYCIN 250 MG/1
TABLET, FILM COATED ORAL
Qty: 6 TABLET | Refills: 0 | Status: SHIPPED | OUTPATIENT
Start: 2021-12-23 | End: 2021-12-28

## 2022-01-28 ENCOUNTER — HOSPITAL ENCOUNTER (OUTPATIENT)
Dept: RADIOLOGY | Facility: OTHER | Age: 42
Discharge: HOME | End: 2022-01-28
Attending: OBSTETRICS & GYNECOLOGY
Payer: COMMERCIAL

## 2022-01-28 DIAGNOSIS — Z12.31 ENCOUNTER FOR SCREENING MAMMOGRAM FOR BREAST CANCER: ICD-10-CM

## 2022-01-28 PROCEDURE — 77063 BREAST TOMOSYNTHESIS BI: CPT | Mod: TC

## 2022-01-28 PROCEDURE — 77067 SCR MAMMO BI INCL CAD: CPT | Mod: 26,,, | Performed by: RADIOLOGY

## 2022-01-28 PROCEDURE — 77063 MAMMO DIGITAL SCREENING BILAT WITH TOMO: ICD-10-PCS | Mod: 26,,, | Performed by: RADIOLOGY

## 2022-01-28 PROCEDURE — 77067 SCR MAMMO BI INCL CAD: CPT | Mod: TC

## 2022-01-28 PROCEDURE — 77063 BREAST TOMOSYNTHESIS BI: CPT | Mod: 26,,, | Performed by: RADIOLOGY

## 2022-01-28 PROCEDURE — 77067 MAMMO DIGITAL SCREENING BILAT WITH TOMO: ICD-10-PCS | Mod: 26,,, | Performed by: RADIOLOGY

## 2022-02-11 ENCOUNTER — OFFICE VISIT (OUTPATIENT)
Dept: GASTROENTEROLOGY | Facility: CLINIC | Age: 42
End: 2022-02-11
Payer: COMMERCIAL

## 2022-02-11 DIAGNOSIS — R14.0 BLOATING: Primary | ICD-10-CM

## 2022-02-11 PROCEDURE — 99203 PR OFFICE/OUTPT VISIT, NEW, LEVL III, 30-44 MIN: ICD-10-PCS | Mod: 95,,, | Performed by: INTERNAL MEDICINE

## 2022-02-11 PROCEDURE — 99203 OFFICE O/P NEW LOW 30 MIN: CPT | Mod: 95,,, | Performed by: INTERNAL MEDICINE

## 2022-02-11 NOTE — PROGRESS NOTES
The patient location is:  home  The chief complaint leading to consultation is: gas, bloating    Visit type: audiovisual    Face to Face time with patient: 15  25 minutes of total time spent on the encounter, which includes face to face time and non-face to face time preparing to see the patient (eg, review of tests), Obtaining and/or reviewing separately obtained history, Documenting clinical information in the electronic or other health record, Independently interpreting results (not separately reported) and communicating results to the patient/family/caregiver, or Care coordination (not separately reported).         Each patient to whom he or she provides medical services by telemedicine is:  (1) informed of the relationship between the physician and patient and the respective role of any other health care provider with respect to management of the patient; and (2) notified that he or she may decline to receive medical services by telemedicine and may withdraw from such care at any time.    Notes:     42 yo F with history of hysterectomy 2021 who has GI clinic.    She complains of gas, bloating. She says this has been progressive since 4 months.  She is taking probiotics (equate gummies).  She tries to eat healthy.   She felt desserts/sweets were making it worse so she reduced sweets.    She has a bowel movement once per day. Sometimes skips 1-2 days. Stools are brown, denies rectal bleeding.  Denies unintentional weight loss - is trying to lose weight.  Denies family history of GI malignancy or diseases.    Denies nausea, vomiting, dysphagia, abdominal pain.  Denies tobacco abuse, drug abuse.  Drinks alcohol - 1 glass of wine per day with 6 drinks on the weekend.       Recommendations  -Check hplyori stool antigen  -Take IBgard as needed  -Counseled on alcohol reduction

## 2022-02-18 ENCOUNTER — PATIENT MESSAGE (OUTPATIENT)
Dept: GASTROENTEROLOGY | Facility: CLINIC | Age: 42
End: 2022-02-18
Payer: COMMERCIAL

## 2022-02-22 ENCOUNTER — PATIENT MESSAGE (OUTPATIENT)
Dept: GASTROENTEROLOGY | Facility: CLINIC | Age: 42
End: 2022-02-22
Payer: COMMERCIAL

## 2022-03-02 ENCOUNTER — LAB VISIT (OUTPATIENT)
Dept: LAB | Facility: HOSPITAL | Age: 42
End: 2022-03-02
Attending: INTERNAL MEDICINE
Payer: COMMERCIAL

## 2022-03-02 DIAGNOSIS — R14.0 BLOATING: ICD-10-CM

## 2022-03-02 PROCEDURE — 87338 HPYLORI STOOL AG IA: CPT | Performed by: INTERNAL MEDICINE

## 2022-03-08 ENCOUNTER — PATIENT MESSAGE (OUTPATIENT)
Dept: GASTROENTEROLOGY | Facility: CLINIC | Age: 42
End: 2022-03-08
Payer: COMMERCIAL

## 2022-03-08 LAB
H PYLORI AG STL QL IA: NORMAL
SPECIMEN SOURCE: NORMAL

## 2022-03-16 DIAGNOSIS — R19.8 CHANGE IN BOWEL MOVEMENT: Primary | ICD-10-CM

## 2022-03-17 ENCOUNTER — PATIENT MESSAGE (OUTPATIENT)
Dept: ENDOSCOPY | Facility: HOSPITAL | Age: 42
End: 2022-03-17
Payer: COMMERCIAL

## 2022-03-17 ENCOUNTER — TELEPHONE (OUTPATIENT)
Dept: ENDOSCOPY | Facility: HOSPITAL | Age: 42
End: 2022-03-17
Payer: COMMERCIAL

## 2022-03-17 DIAGNOSIS — Z12.11 COLON CANCER SCREENING: Primary | ICD-10-CM

## 2022-03-17 RX ORDER — POLYETHYLENE GLYCOL 3350, SODIUM SULFATE ANHYDROUS, SODIUM BICARBONATE, SODIUM CHLORIDE, POTASSIUM CHLORIDE 236; 22.74; 6.74; 5.86; 2.97 G/4L; G/4L; G/4L; G/4L; G/4L
4 POWDER, FOR SOLUTION ORAL ONCE
Qty: 4000 ML | Refills: 0 | Status: SHIPPED | OUTPATIENT
Start: 2022-03-17 | End: 2022-03-17

## 2022-03-28 ENCOUNTER — OFFICE VISIT (OUTPATIENT)
Dept: FAMILY MEDICINE | Facility: CLINIC | Age: 42
End: 2022-03-28
Payer: COMMERCIAL

## 2022-03-28 ENCOUNTER — TELEPHONE (OUTPATIENT)
Dept: FAMILY MEDICINE | Facility: CLINIC | Age: 42
End: 2022-03-28

## 2022-03-28 VITALS
OXYGEN SATURATION: 96 % | DIASTOLIC BLOOD PRESSURE: 80 MMHG | BODY MASS INDEX: 32.84 KG/M2 | SYSTOLIC BLOOD PRESSURE: 122 MMHG | HEIGHT: 69 IN | HEART RATE: 77 BPM | WEIGHT: 221.69 LBS

## 2022-03-28 DIAGNOSIS — Z11.59 ENCOUNTER FOR HEPATITIS C SCREENING TEST FOR LOW RISK PATIENT: ICD-10-CM

## 2022-03-28 DIAGNOSIS — Z11.4 ENCOUNTER FOR SCREENING FOR HUMAN IMMUNODEFICIENCY VIRUS (HIV): ICD-10-CM

## 2022-03-28 DIAGNOSIS — Z00.00 ANNUAL PHYSICAL EXAM: Primary | ICD-10-CM

## 2022-03-28 DIAGNOSIS — R82.81 PYURIA: Primary | ICD-10-CM

## 2022-03-28 DIAGNOSIS — E66.9 CLASS 1 OBESITY WITH BODY MASS INDEX (BMI) OF 32.0 TO 32.9 IN ADULT, UNSPECIFIED OBESITY TYPE, UNSPECIFIED WHETHER SERIOUS COMORBIDITY PRESENT: ICD-10-CM

## 2022-03-28 DIAGNOSIS — Z23 NEED FOR TDAP VACCINATION: ICD-10-CM

## 2022-03-28 DIAGNOSIS — R82.81 PYURIA: ICD-10-CM

## 2022-03-28 PROCEDURE — 90471 IMMUNIZATION ADMIN: CPT | Mod: S$GLB,,, | Performed by: FAMILY MEDICINE

## 2022-03-28 PROCEDURE — 1159F PR MEDICATION LIST DOCUMENTED IN MEDICAL RECORD: ICD-10-PCS | Mod: CPTII,S$GLB,, | Performed by: FAMILY MEDICINE

## 2022-03-28 PROCEDURE — 90715 TDAP VACCINE 7 YRS/> IM: CPT | Mod: S$GLB,,, | Performed by: FAMILY MEDICINE

## 2022-03-28 PROCEDURE — 3008F BODY MASS INDEX DOCD: CPT | Mod: CPTII,S$GLB,, | Performed by: FAMILY MEDICINE

## 2022-03-28 PROCEDURE — 3074F PR MOST RECENT SYSTOLIC BLOOD PRESSURE < 130 MM HG: ICD-10-PCS | Mod: CPTII,S$GLB,, | Performed by: FAMILY MEDICINE

## 2022-03-28 PROCEDURE — 90715 TDAP VACCINE GREATER THAN OR EQUAL TO 7YO IM: ICD-10-PCS | Mod: S$GLB,,, | Performed by: FAMILY MEDICINE

## 2022-03-28 PROCEDURE — 1160F PR REVIEW ALL MEDS BY PRESCRIBER/CLIN PHARMACIST DOCUMENTED: ICD-10-PCS | Mod: CPTII,S$GLB,, | Performed by: FAMILY MEDICINE

## 2022-03-28 PROCEDURE — 99999 PR PBB SHADOW E&M-EST. PATIENT-LVL IV: CPT | Mod: PBBFAC,,, | Performed by: FAMILY MEDICINE

## 2022-03-28 PROCEDURE — 99999 PR PBB SHADOW E&M-EST. PATIENT-LVL IV: ICD-10-PCS | Mod: PBBFAC,,, | Performed by: FAMILY MEDICINE

## 2022-03-28 PROCEDURE — 1159F MED LIST DOCD IN RCRD: CPT | Mod: CPTII,S$GLB,, | Performed by: FAMILY MEDICINE

## 2022-03-28 PROCEDURE — 1160F RVW MEDS BY RX/DR IN RCRD: CPT | Mod: CPTII,S$GLB,, | Performed by: FAMILY MEDICINE

## 2022-03-28 PROCEDURE — 99396 PR PREVENTIVE VISIT,EST,40-64: ICD-10-PCS | Mod: 25,S$GLB,, | Performed by: FAMILY MEDICINE

## 2022-03-28 PROCEDURE — 3008F PR BODY MASS INDEX (BMI) DOCUMENTED: ICD-10-PCS | Mod: CPTII,S$GLB,, | Performed by: FAMILY MEDICINE

## 2022-03-28 PROCEDURE — 3079F PR MOST RECENT DIASTOLIC BLOOD PRESSURE 80-89 MM HG: ICD-10-PCS | Mod: CPTII,S$GLB,, | Performed by: FAMILY MEDICINE

## 2022-03-28 PROCEDURE — 99396 PREV VISIT EST AGE 40-64: CPT | Mod: 25,S$GLB,, | Performed by: FAMILY MEDICINE

## 2022-03-28 PROCEDURE — 90471 TDAP VACCINE GREATER THAN OR EQUAL TO 7YO IM: ICD-10-PCS | Mod: S$GLB,,, | Performed by: FAMILY MEDICINE

## 2022-03-28 PROCEDURE — 3074F SYST BP LT 130 MM HG: CPT | Mod: CPTII,S$GLB,, | Performed by: FAMILY MEDICINE

## 2022-03-28 PROCEDURE — 3079F DIAST BP 80-89 MM HG: CPT | Mod: CPTII,S$GLB,, | Performed by: FAMILY MEDICINE

## 2022-03-28 RX ORDER — NAPROXEN SODIUM 220 MG
220 TABLET ORAL 2 TIMES DAILY PRN
COMMUNITY
End: 2022-09-21

## 2022-03-28 NOTE — PROGRESS NOTES
[facility] Note    Chief Complaint    No chief complaint on file.    History of Present Illness     Desire Kilpatrick is a 41 y.o. female who presents today with:    HPI      Patient is a 41 y.o female here today for annual well visit with labs. Reports she is scheduled for colonoscopy 05/09/22 for constipation and abdominal pain, takes IB-guard x 1 week without relief in symptoms; currently taking probiotics, no current problems at this time.     Reports working towards eating right and working out at least 3 times per week; drinks at least 5 bottles per day.     Health Maintenance:    UTD      Problem List Items Addressed This Visit    None     Visit Diagnoses     Annual physical exam    -  Primary    Relevant Orders    HIV 1/2 Ag/Ab (4th Gen)    Hepatitis C Antibody    T4, Free    CBC Auto Differential    Comprehensive Metabolic Panel    Hemoglobin A1C    Lipid Panel    TSH    Urinalysis    Need for Tdap vaccination        Relevant Orders    (In Office Administered) Tdap Vaccine    Encounter for screening for human immunodeficiency virus (HIV)        Relevant Orders    HIV 1/2 Ag/Ab (4th Gen)    Encounter for hepatitis C screening test for low risk patient        Relevant Orders    Hepatitis C Antibody          Health Maintenance   Topic Date Due    Hepatitis C Screening  Never done    TETANUS VACCINE  08/01/2005    Mammogram  01/28/2023    Lipid Panel  Completed       Past Medical History:   Diagnosis Date    Migraine headache     with aura    Mitral valve prolapse 2012    PONV (postoperative nausea and vomiting)        Past Surgical History:   Procedure Laterality Date    ARTHROSCOPIC CHONDROPLASTY OF KNEE JOINT Left 8/24/2018    Procedure: ARTHROSCOPY, KNEE, WITH CHONDROPLASTY;  Surgeon: Konrad Mckeon MD;  Location: [facility];  Service: Orthopedics;  Laterality: Left;    ARTHROSCOPY OF KNEE Left 8/24/2018    Procedure: ARTHROSCOPY, KNEE;  Surgeon: Konrad Mckeon MD;   Location: [facility];  Service: Orthopedics;  Laterality: Left;    Breast Reduction      CYSTOSCOPY N/A 2021    Procedure: CYSTOSCOPY;  Surgeon: Puja Hearn MD;  Location: [facility] OR;  Service: OB/GYN;  Laterality: N/A;    HYSTERECTOMY      2020    LATERAL RETINACULA RELEASE OF KNEE Left 2018    Procedure: RELEASE, KNEE, LATERAL RETINACULA;  Surgeon: Konrad Mckeon MD;  Location: [facility] OR;  Service: Orthopedics;  Laterality: Left;    ROBOT-ASSISTED LAPAROSCOPIC HYSTERECTOMY N/A 2021    Procedure: ROBOTIC HYSTERECTOMY;  Surgeon: Puja Hearn MD;  Location: [facility] OR;  Service: OB/GYN;  Laterality: N/A;    SALPINGECTOMY Bilateral 2021    Procedure: SALPINGECTOMY;  Surgeon: Puja Hearn MD;  Location: [facility] OR;  Service: OB/GYN;  Laterality: Bilateral;    TOTAL REDUCTION MAMMOPLASTY          TUBAL LIGATION      tummy tuck         family history includes Breast cancer in her maternal aunt, maternal aunt, and maternal aunt; Cancer in her paternal grandmother; Hypertension in her mother; Lupus in her maternal grandmother.     Social History     Tobacco Use    Smoking status: Former Smoker     Quit date: 2021     Years since quittin.7    Smokeless tobacco: Never Used   Substance Use Topics    Alcohol use: Yes     Comment: occasional    Drug use: No       Review of Systems   Constitutional: Negative for chills, fever, malaise/fatigue and weight loss.   HENT: Negative for congestion, ear discharge, ear pain and hearing loss.    Eyes: Negative for blurred vision and discharge.   Respiratory: Negative for cough, shortness of breath and wheezing.    Cardiovascular: Negative for chest pain, palpitations and leg swelling.   Gastrointestinal: Negative for abdominal pain, blood in stool, constipation, diarrhea and vomiting.   Genitourinary: Negative for dysuria and hematuria.   Musculoskeletal: Negative for myalgias and neck pain.   Skin: Negative for rash.   Neurological: Negative  for dizziness, tingling, focal weakness, weakness and headaches.   Endo/Heme/Allergies: Negative for polydipsia. Does not bruise/bleed easily.   Psychiatric/Behavioral: Negative for depression and suicidal ideas.        Outpatient Encounter Medications as of 3/28/2022   Medication Sig Dispense Refill    ibuprofen (ADVIL,MOTRIN) 800 MG tablet Take 1 tablet (800 mg total) by mouth every 8 (eight) hours as needed for Pain (mild to moderate pain). 30 tablet 0    naproxen sodium (ANAPROX) 220 MG tablet Take 220 mg by mouth 2 (two) times daily as needed. Takes 2 pills as needed for headache      [DISCONTINUED] albuterol (VENTOLIN HFA) 90 mcg/actuation inhaler Inhale 2 puffs into the lungs every 6 (six) hours as needed for Wheezing. Rescue 18 g 0    [DISCONTINUED] fluticasone propionate (FLONASE) 50 mcg/actuation nasal spray 1 spray (50 mcg total) by Each Nostril route once daily. (Patient not taking: Reported on 12/23/2021) 16 g 2    [DISCONTINUED] predniSONE (DELTASONE) 20 MG tablet Take 1 tablet (20 mg total) by mouth once daily. 5 tablet 0     No facility-administered encounter medications on file as of 3/28/2022.       Review of patient's allergies indicates:  No Known Allergies    Physical Exam       ]    Physical Exam  Vitals reviewed.   Constitutional:       General: She is not in acute distress.     Appearance: Normal appearance. She is obese. She is not ill-appearing.   HENT:      Head: Normocephalic.      Right Ear: Hearing and tympanic membrane normal.      Left Ear: Hearing and tympanic membrane normal.      Nose: Nose normal.      Mouth/Throat:      Mouth: Mucous membranes are moist.      Pharynx: Oropharynx is clear.   Eyes:      Extraocular Movements: Extraocular movements intact.      Conjunctiva/sclera: Conjunctivae normal.      Pupils: Pupils are equal, round, and reactive to light.   Cardiovascular:      Rate and Rhythm: Normal rate and regular rhythm.      Pulses: Normal pulses.      Heart sounds:  Normal heart sounds. No murmur heard.  Pulmonary:      Effort: Pulmonary effort is normal.      Breath sounds: Normal breath sounds. No decreased breath sounds, wheezing, rhonchi or rales.   Abdominal:      General: Abdomen is flat. Bowel sounds are normal. There is no distension.      Palpations: Abdomen is soft.      Tenderness: There is no abdominal tenderness.   Musculoskeletal:         General: Normal range of motion.      Cervical back: Normal range of motion and neck supple.      Right lower leg: No edema.      Left lower leg: No edema.   Skin:     General: Skin is warm and dry.      Capillary Refill: Capillary refill takes less than 2 seconds.      Findings: No rash.   Neurological:      General: No focal deficit present.      Mental Status: She is alert and oriented to person, place, and time.      Motor: No weakness.      Gait: Gait normal.   Psychiatric:         Mood and Affect: Mood normal.         Behavior: Behavior normal.         Thought Content: Thought content normal.         Judgment: Judgment normal.          Laboratory:  CBC:  No results for input(s): WBC, RBC, HGB, HCT, PLT, MCV, MCH, MCHC in the last 2160 hours.  CMP:  No results for input(s): GLU, CALCIUM, ALBUMIN, PROT, NA, K, CO2, CL, BUN, ALKPHOS, ALT, AST, BILITOT in the last 2160 hours.    Invalid input(s): CREATININ  URINALYSIS:  No results for input(s): COLORU, CLARITYU, SPECGRAV, PHUR, PROTEINUA, GLUCOSEU, BILIRUBINCON, BLOODU, WBCU, RBCU, BACTERIA, MUCUS, NITRITE, LEUKOCYTESUR, UROBILINOGEN, HYALINECASTS in the last 2160 hours.   LIPIDS:  No results for input(s): TSH, HDL, CHOL, TRIG, LDLCALC, CHOLHDL, NONHDLCHOL, TOTALCHOLEST in the last 2160 hours.  TSH:  No results for input(s): TSH in the last 2160 hours.  A1C:  No results for input(s): HGBA1C in the last 2160 hours.    Radiology:      Assessment/Plan     Desire Kilpatrick is a 41 y.o.female with:    1. Annual physical exam  - HIV 1/2 Ag/Ab (4th Gen); Future  - Hepatitis C  Antibody; Future  - T4, Free; Future  - CBC Auto Differential; Future  - Comprehensive Metabolic Panel; Future  - Hemoglobin A1C; Future  - Lipid Panel; Future  - TSH; Future  - Urinalysis; Future    2. Class 1 obesity with body mass index (BMI) of 32.0 to 32.9 in adult, unspecified obesity type, unspecified whether serious comorbidity present    -continue diet modification, drinking water, and exercise three times per week; provided info on low carb diet and weight loss diet    3. Need for Tdap vaccination  - (In Office Administered) Tdap Vaccine    4. Encounter for screening for human immunodeficiency virus (HIV)  - HIV 1/2 Ag/Ab (4th Gen); Future    5. Encounter for hepatitis C screening test for low risk patient  - Hepatitis C Antibody; Future    -Tdap vaccine administered in office  -Continue current medications and maintain follow up with specialists.    -advised will notify of lab results  -RTC in 1 year or sooner if needed     Hai Sales Jr, MD  [facility]

## 2022-03-29 ENCOUNTER — PATIENT MESSAGE (OUTPATIENT)
Dept: FAMILY MEDICINE | Facility: CLINIC | Age: 42
End: 2022-03-29
Payer: COMMERCIAL

## 2022-03-30 ENCOUNTER — TELEPHONE (OUTPATIENT)
Dept: FAMILY MEDICINE | Facility: CLINIC | Age: 42
End: 2022-03-30
Payer: COMMERCIAL

## 2022-03-30 DIAGNOSIS — N30.00 ACUTE CYSTITIS WITHOUT HEMATURIA: Primary | ICD-10-CM

## 2022-03-30 RX ORDER — NITROFURANTOIN 25; 75 MG/1; MG/1
100 CAPSULE ORAL 2 TIMES DAILY
Qty: 10 CAPSULE | Refills: 0 | Status: SHIPPED | OUTPATIENT
Start: 2022-03-30 | End: 2022-04-04

## 2022-06-01 ENCOUNTER — ANESTHESIA (OUTPATIENT)
Dept: ENDOSCOPY | Facility: HOSPITAL | Age: 42
End: 2022-06-01
Payer: COMMERCIAL

## 2022-06-01 ENCOUNTER — HOSPITAL ENCOUNTER (OUTPATIENT)
Facility: HOSPITAL | Age: 42
Discharge: HOME | End: 2022-06-01
Attending: INTERNAL MEDICINE | Admitting: INTERNAL MEDICINE
Payer: COMMERCIAL

## 2022-06-01 ENCOUNTER — ANESTHESIA EVENT (OUTPATIENT)
Dept: ENDOSCOPY | Facility: HOSPITAL | Age: 42
End: 2022-06-01
Payer: COMMERCIAL

## 2022-06-01 VITALS
OXYGEN SATURATION: 99 % | TEMPERATURE: 98 F | RESPIRATION RATE: 16 BRPM | HEART RATE: 64 BPM | WEIGHT: 216 LBS | SYSTOLIC BLOOD PRESSURE: 126 MMHG | DIASTOLIC BLOOD PRESSURE: 84 MMHG | BODY MASS INDEX: 31.99 KG/M2 | HEIGHT: 69 IN

## 2022-06-01 DIAGNOSIS — Z12.11 COLON CANCER SCREENING: Primary | ICD-10-CM

## 2022-06-01 DIAGNOSIS — Z12.11 SCREENING FOR COLON CANCER: ICD-10-CM

## 2022-06-01 PROCEDURE — 37000008 HC ANESTHESIA 1ST 15 MINUTES: Performed by: INTERNAL MEDICINE

## 2022-06-01 PROCEDURE — 63600175 PHARM REV CODE 636 W HCPCS: Performed by: NURSE ANESTHETIST, CERTIFIED REGISTERED

## 2022-06-01 PROCEDURE — 37000009 HC ANESTHESIA EA ADD 15 MINS: Performed by: INTERNAL MEDICINE

## 2022-06-01 PROCEDURE — G0121 COLON CA SCRN NOT HI RSK IND: ICD-10-PCS | Mod: ,,, | Performed by: INTERNAL MEDICINE

## 2022-06-01 PROCEDURE — 25000003 PHARM REV CODE 250: Performed by: INTERNAL MEDICINE

## 2022-06-01 PROCEDURE — 25000003 PHARM REV CODE 250: Performed by: NURSE ANESTHETIST, CERTIFIED REGISTERED

## 2022-06-01 PROCEDURE — G0121 COLON CA SCRN NOT HI RSK IND: HCPCS | Mod: ,,, | Performed by: INTERNAL MEDICINE

## 2022-06-01 PROCEDURE — G0121 COLON CA SCRN NOT HI RSK IND: HCPCS | Performed by: INTERNAL MEDICINE

## 2022-06-01 RX ORDER — PROPOFOL 10 MG/ML
VIAL (ML) INTRAVENOUS
Status: DISCONTINUED | OUTPATIENT
Start: 2022-06-01 | End: 2022-06-01

## 2022-06-01 RX ORDER — SODIUM CHLORIDE 9 MG/ML
INJECTION, SOLUTION INTRAVENOUS CONTINUOUS
Status: DISCONTINUED | OUTPATIENT
Start: 2022-06-01 | End: 2022-06-01 | Stop reason: HOSPADM

## 2022-06-01 RX ORDER — PROPOFOL 10 MG/ML
VIAL (ML) INTRAVENOUS CONTINUOUS PRN
Status: DISCONTINUED | OUTPATIENT
Start: 2022-06-01 | End: 2022-06-01

## 2022-06-01 RX ORDER — LIDOCAINE HYDROCHLORIDE 20 MG/ML
INJECTION INTRAVENOUS
Status: DISCONTINUED | OUTPATIENT
Start: 2022-06-01 | End: 2022-06-01

## 2022-06-01 RX ADMIN — LIDOCAINE HYDROCHLORIDE 50 MG: 20 INJECTION, SOLUTION INTRAVENOUS at 03:06

## 2022-06-01 RX ADMIN — PROPOFOL 100 MG: 10 INJECTION, EMULSION INTRAVENOUS at 03:06

## 2022-06-01 RX ADMIN — Medication 150 MCG/KG/MIN: at 03:06

## 2022-06-01 RX ADMIN — SODIUM CHLORIDE: 0.9 INJECTION, SOLUTION INTRAVENOUS at 02:06

## 2022-06-01 NOTE — ANESTHESIA POSTPROCEDURE EVALUATION
Anesthesia Post Evaluation    Patient: Desire Kilpatrick    Procedure(s) Performed: Procedure(s) (LRB):  COLONOSCOPY (N/A)    Final Anesthesia Type: general      Patient location during evaluation: PACU  Patient participation: Yes- Able to Participate  Level of consciousness: awake and alert  Post-procedure vital signs: reviewed and stable  Pain management: adequate  Airway patency: patent    PONV status at discharge: No PONV  Anesthetic complications: no      Cardiovascular status: blood pressure returned to baseline  Respiratory status: unassisted and spontaneous ventilation  Hydration status: euvolemic  Follow-up not needed.          Vitals Value Taken Time   /84 06/01/22 1615   Temp 36.5 °C (97.7 °F) 06/01/22 1521   Pulse 64 06/01/22 1615   Resp 16 06/01/22 1615   SpO2 99 % 06/01/22 1615         Event Time   Out of Recovery 16:16:38         Pain/Kinjal Score: Kinjal Score: 10 (6/1/2022  3:22 PM)

## 2022-06-01 NOTE — TRANSFER OF CARE
"Anesthesia Transfer of Care Note    Patient: Desire Kilpatrick    Procedure(s) Performed: Procedure(s) (LRB):  COLONOSCOPY (N/A)    Patient location: PACU    Anesthesia Type: general    Transport from OR: Transported from OR on room air with adequate spontaneous ventilation    Post pain: adequate analgesia    Post assessment: no apparent anesthetic complications    Post vital signs: stable    Level of consciousness: awake    Nausea/Vomiting: no nausea/vomiting    Complications: none    Transfer of care protocol was followed      Last vitals:   Visit Vitals  /72 (BP Location: Left arm, Patient Position: Lying)   Pulse 72   Temp 36.7 °C (98.1 °F) (Temporal)   Resp 18   Ht 5' 9" (1.753 m)   Wt 98 kg (216 lb)   LMP 06/07/2021 (Approximate)   SpO2 100%   Breastfeeding No   BMI 31.90 kg/m²     "

## 2022-06-01 NOTE — PROVATION PATIENT INSTRUCTIONS
Discharge Summary/Instructions after an Endoscopic Procedure  Patient Name: Desire Figueroa MRN: [id number]  Patient YOB: 1980 Wednesday, June 1, 2022  Didier Contreras MD  Dear patient,  As a result of recent federal legislation (The Federal Cures Act), you may   receive lab or pathology results from your procedure in your [facility]   account before your physician is able to contact you. Your physician or   their representative will relay the results to you with their   recommendations at their soonest availability.  Thank you,  RESTRICTIONS:  During your procedure today, you received medications for sedation.  These   medications may affect your judgment, balance and coordination.  Therefore,   for 24 hours, you have the following restrictions:   - DO NOT drive a car, operate machinery, make legal/financial decisions,   sign important papers or drink alcohol.    ACTIVITY:  Today: no heavy lifting, straining or running due to procedural   sedation/anesthesia.  The following day: return to full activity including work.  DIET:  Eat and drink normally unless instructed otherwise.     TREATMENT FOR COMMON SIDE EFFECTS:  - Mild abdominal pain, nausea, belching, bloating or excessive gas:  rest,   eat lightly and use a heating pad.  - Sore Throat: treat with throat lozenges and/or gargle with warm salt   water.  - Because air was used during the procedure, expelling large amounts of air   from your rectum or belching is normal.  - If a bowel prep was taken, you may not have a bowel movement for 1-3 days.    This is normal.  SYMPTOMS TO WATCH FOR AND REPORT TO YOUR PHYSICIAN:  1. Abdominal pain or bloating, other than gas cramps.  2. Chest pain.  3. Back pain.  4. Signs of infection such as: chills or fever occurring within 24 hours   after the procedure.  5. Rectal bleeding, which would show as bright red, maroon, or black stools.   (A tablespoon of blood from the rectum is not serious, especially if    hemorrhoids are present.)  6. Vomiting.  7. Weakness or dizziness.  GO DIRECTLY TO THE NEAREST EMERGENCY ROOM IF YOU HAVE ANY OF THE FOLLOWING:      Difficulty breathing              Chills and/or fever over 101 F   Persistent vomiting and/or vomiting blood   Severe abdominal pain   Severe chest pain   Black, tarry stools   Bleeding- more than one tablespoon   Any other symptom or condition that you feel may need urgent attention  Your doctor recommends these additional instructions:  If any biopsies were taken, your doctors clinic will contact you in 1 to 2   weeks with any results.  - Patient has a contact number available for emergencies.  The signs and   symptoms of potential delayed complications were discussed with the   patient.  Return to normal activities tomorrow.  Written discharge   instructions were provided to the patient.   - Discharge patient to home.   - Resume previous diet.   - Continue present medications.   - Repeat colonoscopy in 10 years for screening purposes.   For questions, problems or results please call your physician - Didier Contreras MD at Work:  [phone].  [facility], EMERGENCY ROOM PHONE NUMBER: [phone]  IF A COMPLICATION OR EMERGENCY SITUATION ARISES AND YOU ARE UNABLE TO REACH   YOUR PHYSICIAN - GO DIRECTLY TO THE EMERGENCY ROOM.  Didier Contreras MD  6/1/2022 3:16:31 PM  This report has been verified and signed electronically.  Dear patient,  As a result of recent federal legislation (The Federal Cures Act), you may   receive lab or pathology results from your procedure in your [facility]   account before your physician is able to contact you. Your physician or   their representative will relay the results to you with their   recommendations at their soonest availability.  Thank you,  PROVATION

## 2022-06-01 NOTE — H&P
Short Stay Endoscopy History and Physical    PCP - Christiano Heaton MD    Procedure - Colonoscopy  Sedation: GA  ASA - per anesthesia  Mallampati - per anesthesia  History of Anesthesia problems - no  Family history Anesthesia problems -  no     HPI:  This is a 41 y.o. female here for evaluation of : Screening for CRC    Reflux - no  Dysphagia - no  Abdominal pain - no  Diarrhea - no    ROS:  Constitutional: No fevers, chills, No weight loss  ENT: No allergies  CV: No chest pain  Pulm: No cough, No shortness of breath  Ophtho: No vision changes  GI: see HPI  Medical History:  has a past medical history of Migraine headache, Mitral valve prolapse (2012), and PONV (postoperative nausea and vomiting).    Surgical History:  has a past surgical history that includes Tubal ligation; tummy tuck; Breast Reduction; Arthroscopy of knee (Left, 8/24/2018); Arthroscopic chondroplasty of knee joint (Left, 8/24/2018); Lateral retinacula release of knee (Left, 8/24/2018); Robot-assisted laparoscopic hysterectomy (N/A, 6/23/2021); Cystoscopy (N/A, 6/23/2021); Salpingectomy (Bilateral, 6/23/2021); Total Reduction Mammoplasty; and Hysterectomy.    Family History: family history includes Breast cancer in her maternal aunt, maternal aunt, maternal aunt, and mother; Cancer in her paternal grandmother; Hypertension in her mother; Lupus in her maternal grandmother.. Otherwise no colon cancer, inflammatory bowel disease, or GI malignancies.    Social History:  reports that she quit smoking about 10 months ago. She has never used smokeless tobacco. She reports current alcohol use. She reports that she does not use drugs.    Review of patient's allergies indicates:  No Known Allergies    Medications:   Medications Prior to Admission   Medication Sig Dispense Refill Last Dose    ibuprofen (ADVIL,MOTRIN) 800 MG tablet Take 1 tablet (800 mg total) by mouth every 8 (eight) hours as needed for Pain (mild to moderate pain). 30 tablet 0 Past Week at  Unknown time    naproxen sodium (ANAPROX) 220 MG tablet Take 220 mg by mouth 2 (two) times daily as needed. Takes 2 pills as needed for headache   Past Week at Unknown time       Objective Findings:    Vital Signs: Per nursing notes.    Physical Exam:  General Appearance: Well appearing in no acute distress  Head:   Normocephalic, without obvious abnormality  Eyes:    No scleral icterus  Airway: Open  Neck: No restriction in mobility  Lungs: CTA bilaterally in anterior and posterior fields, no wheezes, no crackles.  Heart:  Regular rate and rhythm, S1, S2 normal, no murmurs heard  Abdomen: Soft, non tender, non distended      Labs:  Lab Results   Component Value Date    WBC 7.21 03/28/2022    HGB 13.4 03/28/2022    HCT 41.3 03/28/2022     03/28/2022    CHOL 170 03/28/2022    TRIG 69 03/28/2022    HDL 41 03/28/2022    ALT 20 03/28/2022    AST 29 03/28/2022     03/28/2022    K 3.9 03/28/2022     03/28/2022    CREATININE 0.78 03/28/2022    BUN 9 03/28/2022    CO2 26 03/28/2022    TSH 0.630 03/28/2022    HGBA1C 5.5 03/28/2022         I have explained the risks and benefits of endoscopy procedures to the patient including but not limited to bleeding, perforation, infection, and death.    Thank you so much for allowing me to participate in the care of Desire Babin Royal Didier Contreras MD

## 2022-06-01 NOTE — ANESTHESIA PREPROCEDURE EVALUATION
06/01/2022  Desire Kilpatrick is a 41 y.o., female.  Past Medical History:   Diagnosis Date    Migraine headache     with aura    Mitral valve prolapse 2012    PONV (postoperative nausea and vomiting)      Past Surgical History:   Procedure Laterality Date    ARTHROSCOPIC CHONDROPLASTY OF KNEE JOINT Left 8/24/2018    Procedure: ARTHROSCOPY, KNEE, WITH CHONDROPLASTY;  Surgeon: Konrad Mckeon MD;  Location: [facility];  Service: Orthopedics;  Laterality: Left;    ARTHROSCOPY OF KNEE Left 8/24/2018    Procedure: ARTHROSCOPY, KNEE;  Surgeon: Konrad Mckeon MD;  Location: [facility] OR;  Service: Orthopedics;  Laterality: Left;    Breast Reduction      CYSTOSCOPY N/A 6/23/2021    Procedure: CYSTOSCOPY;  Surgeon: Puja Hearn MD;  Location: [facility] OR;  Service: OB/GYN;  Laterality: N/A;    HYSTERECTOMY      6/2020    LATERAL RETINACULA RELEASE OF KNEE Left 8/24/2018    Procedure: RELEASE, KNEE, LATERAL RETINACULA;  Surgeon: Konrad Mckeon MD;  Location: [facility] OR;  Service: Orthopedics;  Laterality: Left;    ROBOT-ASSISTED LAPAROSCOPIC HYSTERECTOMY N/A 6/23/2021    Procedure: ROBOTIC HYSTERECTOMY;  Surgeon: Puja Hearn MD;  Location: [facility] OR;  Service: OB/GYN;  Laterality: N/A;    SALPINGECTOMY Bilateral 6/23/2021    Procedure: SALPINGECTOMY;  Surgeon: Puja Hearn MD;  Location: [facility];  Service: OB/GYN;  Laterality: Bilateral;    TOTAL REDUCTION MAMMOPLASTY      2010    TUBAL LIGATION      tummy tuck           Pre-op Assessment    I have reviewed the Patient Summary Reports.     I have reviewed the Nursing Notes. I have reviewed the NPO Status.   I have reviewed the Medications.     Review of Systems  Anesthesia Hx:  No problems with previous Anesthesia Hx of Anesthetic complications  Personal Hx of Anesthesia complications, Post-Operative Nausea/Vomiting, in the past, but not with recent  anesthetics / prophylaxis   Social:  Former Smoker, Social Alcohol Use    Musculoskeletal:   Arthritis     Neurological:   Headaches    Endocrine:  Obesity / BMI > 30      Physical Exam  General: Well nourished, Cooperative, Alert and Oriented    Airway:  Mallampati: I   Mouth Opening: Normal  TM Distance: Normal  Tongue: Normal  Neck ROM: Normal ROM    Dental:  Intact        Anesthesia Plan  Type of Anesthesia, risks & benefits discussed:    Anesthesia Type: Gen Natural Airway  Intra-op Monitoring Plan: Standard ASA Monitors  Post Op Pain Control Plan: multimodal analgesia  Induction:  IV  Informed Consent: Informed consent signed with the Patient and all parties understand the risks and agree with anesthesia plan.  All questions answered.   ASA Score: 2  Day of Surgery Review of History & Physical: H&P Update referred to the surgeon/provider.    Ready For Surgery From Anesthesia Perspective.     .

## 2022-06-07 ENCOUNTER — PATIENT MESSAGE (OUTPATIENT)
Dept: GASTROENTEROLOGY | Facility: CLINIC | Age: 42
End: 2022-06-07
Payer: COMMERCIAL

## 2022-06-07 RX ORDER — ONDANSETRON 4 MG/1
4 TABLET, ORALLY DISINTEGRATING ORAL EVERY 6 HOURS PRN
Qty: 30 TABLET | Refills: 0 | Status: SHIPPED | OUTPATIENT
Start: 2022-06-07 | End: 2022-09-21

## 2022-06-07 RX ORDER — SCOLOPAMINE TRANSDERMAL SYSTEM 1 MG/1
1 PATCH, EXTENDED RELEASE TRANSDERMAL
Qty: 5 PATCH | Refills: 0 | Status: SHIPPED | OUTPATIENT
Start: 2022-06-07 | End: 2022-09-21

## 2022-09-21 ENCOUNTER — OFFICE VISIT (OUTPATIENT)
Dept: FAMILY MEDICINE | Facility: CLINIC | Age: 42
End: 2022-09-21
Payer: COMMERCIAL

## 2022-09-21 VITALS
WEIGHT: 210.44 LBS | DIASTOLIC BLOOD PRESSURE: 86 MMHG | HEIGHT: 69 IN | TEMPERATURE: 99 F | SYSTOLIC BLOOD PRESSURE: 134 MMHG | OXYGEN SATURATION: 96 % | BODY MASS INDEX: 31.17 KG/M2 | HEART RATE: 69 BPM

## 2022-09-21 DIAGNOSIS — J02.9 VIRAL PHARYNGITIS: Primary | ICD-10-CM

## 2022-09-21 PROCEDURE — 3079F PR MOST RECENT DIASTOLIC BLOOD PRESSURE 80-89 MM HG: ICD-10-PCS | Mod: CPTII,S$GLB,, | Performed by: PEDIATRICS

## 2022-09-21 PROCEDURE — 1159F PR MEDICATION LIST DOCUMENTED IN MEDICAL RECORD: ICD-10-PCS | Mod: CPTII,S$GLB,, | Performed by: PEDIATRICS

## 2022-09-21 PROCEDURE — 1160F RVW MEDS BY RX/DR IN RCRD: CPT | Mod: CPTII,S$GLB,, | Performed by: PEDIATRICS

## 2022-09-21 PROCEDURE — 1159F MED LIST DOCD IN RCRD: CPT | Mod: CPTII,S$GLB,, | Performed by: PEDIATRICS

## 2022-09-21 PROCEDURE — 1160F PR REVIEW ALL MEDS BY PRESCRIBER/CLIN PHARMACIST DOCUMENTED: ICD-10-PCS | Mod: CPTII,S$GLB,, | Performed by: PEDIATRICS

## 2022-09-21 PROCEDURE — 3075F SYST BP GE 130 - 139MM HG: CPT | Mod: CPTII,S$GLB,, | Performed by: PEDIATRICS

## 2022-09-21 PROCEDURE — 99214 PR OFFICE/OUTPT VISIT, EST, LEVL IV, 30-39 MIN: ICD-10-PCS | Mod: S$GLB,,, | Performed by: PEDIATRICS

## 2022-09-21 PROCEDURE — 3008F BODY MASS INDEX DOCD: CPT | Mod: CPTII,S$GLB,, | Performed by: PEDIATRICS

## 2022-09-21 PROCEDURE — 99999 PR PBB SHADOW E&M-EST. PATIENT-LVL IV: ICD-10-PCS | Mod: PBBFAC,,, | Performed by: PEDIATRICS

## 2022-09-21 PROCEDURE — 3075F PR MOST RECENT SYSTOLIC BLOOD PRESS GE 130-139MM HG: ICD-10-PCS | Mod: CPTII,S$GLB,, | Performed by: PEDIATRICS

## 2022-09-21 PROCEDURE — 3079F DIAST BP 80-89 MM HG: CPT | Mod: CPTII,S$GLB,, | Performed by: PEDIATRICS

## 2022-09-21 PROCEDURE — 3044F HG A1C LEVEL LT 7.0%: CPT | Mod: CPTII,S$GLB,, | Performed by: PEDIATRICS

## 2022-09-21 PROCEDURE — 3044F PR MOST RECENT HEMOGLOBIN A1C LEVEL <7.0%: ICD-10-PCS | Mod: CPTII,S$GLB,, | Performed by: PEDIATRICS

## 2022-09-21 PROCEDURE — 3008F PR BODY MASS INDEX (BMI) DOCUMENTED: ICD-10-PCS | Mod: CPTII,S$GLB,, | Performed by: PEDIATRICS

## 2022-09-21 PROCEDURE — 99214 OFFICE O/P EST MOD 30 MIN: CPT | Mod: S$GLB,,, | Performed by: PEDIATRICS

## 2022-09-21 PROCEDURE — 99999 PR PBB SHADOW E&M-EST. PATIENT-LVL IV: CPT | Mod: PBBFAC,,, | Performed by: PEDIATRICS

## 2022-09-21 RX ORDER — NAPROXEN 500 MG/1
500 TABLET ORAL 2 TIMES DAILY
Qty: 30 TABLET | Refills: 0 | Status: SHIPPED | OUTPATIENT
Start: 2022-09-21 | End: 2023-02-18 | Stop reason: ALTCHOICE

## 2022-09-21 NOTE — LETTER
September 21, 2022      [place] [address]  [facility] [address]  Phone: [phone]  Fax: [phone]       Patient: Desire Kilpatrick   YOB: 1980  Date of Visit: 09/21/2022    To Whom It May Concern:    Cody Kilpatrick  was at [facility] on 09/21/2022. The patient may return to work/school on 09/23/2022 with no restrictions. If you have any questions or concerns, or if I can be of further assistance, please do not hesitate to contact me.    Sincerely,        Espinoza Neil, NP

## 2022-09-21 NOTE — PROGRESS NOTES
Subjective:      Patient ID: Desire Kilpatrick is a 42 y.o. female.    Chief Complaint: URI (Symptoms started yesterday/ chills/ sore throat/ cough/ post nasal drip// would like covid test// body aches )    Began yesterday w/ sore throat, post nasal drip. Reports cough beginning last night and chills. Did not take temperature but felt like she had fever. Reports body aches this morning. Reports taking multisymptom cold/flu, with no relief.    URI   Associated symptoms include congestion, coughing, nausea, rhinorrhea and a sore throat. Pertinent negatives include no diarrhea, ear pain, headaches, rash, sinus pain, vomiting or wheezing.   Review of Systems   Constitutional:  Positive for chills and fever. Negative for fatigue.   HENT:  Positive for congestion, postnasal drip, rhinorrhea and sore throat. Negative for ear discharge, ear pain, sinus pressure and sinus pain.    Respiratory:  Positive for cough. Negative for shortness of breath and wheezing.    Gastrointestinal:  Positive for nausea. Negative for diarrhea and vomiting.   Genitourinary:  Negative for difficulty urinating.   Musculoskeletal:  Positive for arthralgias and myalgias.   Skin:  Negative for rash.   Neurological:  Negative for dizziness and headaches.   Psychiatric/Behavioral:  Negative for confusion.       No current outpatient medications on file prior to visit.     No current facility-administered medications on file prior to visit.        Objective:     Vitals:    09/21/22 0800   BP: 134/86   Pulse: 69   Temp: 98.8 °F (37.1 °C)      Physical Exam  Constitutional:       General: She is not in acute distress.     Appearance: Normal appearance.   HENT:      Head: Normocephalic and atraumatic.      Right Ear: Tympanic membrane, ear canal and external ear normal.      Left Ear: Tympanic membrane, ear canal and external ear normal.      Nose: Congestion and rhinorrhea present.      Mouth/Throat:      Mouth: Mucous membranes are moist.       Pharynx: Oropharynx is clear. Posterior oropharyngeal erythema present.      Tonsils: 1+ on the right. 1+ on the left.   Eyes:      Extraocular Movements: Extraocular movements intact.      Conjunctiva/sclera: Conjunctivae normal.      Pupils: Pupils are equal, round, and reactive to light.   Cardiovascular:      Rate and Rhythm: Normal rate and regular rhythm.      Pulses: Normal pulses.      Heart sounds: Normal heart sounds.   Pulmonary:      Effort: Pulmonary effort is normal. No respiratory distress.      Breath sounds: Normal breath sounds. No wheezing.   Abdominal:      General: Abdomen is flat. Bowel sounds are normal.   Musculoskeletal:         General: No swelling. Normal range of motion.      Cervical back: Normal range of motion and neck supple.   Lymphadenopathy:      Cervical: Cervical adenopathy present.   Skin:     General: Skin is warm and dry.      Findings: No rash.   Neurological:      Mental Status: She is alert and oriented to person, place, and time.      Gait: Gait normal.   Psychiatric:         Mood and Affect: Mood normal.         Behavior: Behavior normal.      Assessment:         1. Viral pharyngitis          Plan:   1. Viral pharyngitis  - naproxen (NAPROSYN) 500 MG tablet; Take 1 tablet (500 mg total) by mouth 2 (two) times daily.  Dispense: 30 tablet; Refill: 0  - (Magic mouthwash) 1:1:1 diphenhydramine(Benadryl) 12.5mg/5ml liq, aluminum & magnesium hydroxide-simethicone (Maalox), LIDOcaine viscous 2%; Swish and spit 10 mLs every 4 (four) hours as needed (pain). Sore throat  Dispense: 150 mL; Refill: 0  - POCT Influenza A/B Molecular  - POCT COVID-19 Rapid Screening       -Use strict handwashing  -Use OTC cough/cold treatments, check multisymptom products for double dosing  -Drink cool/warm liquids and use lozenges to soothe throat  -Take OTC tylenol for pain  -Follow up as needed or if symptoms worsen            Espinoza Neil NP   [facility]  9/21/22

## 2022-09-22 ENCOUNTER — PATIENT MESSAGE (OUTPATIENT)
Dept: FAMILY MEDICINE | Facility: CLINIC | Age: 42
End: 2022-09-22
Payer: COMMERCIAL

## 2022-09-23 DIAGNOSIS — J02.9 PHARYNGITIS, UNSPECIFIED ETIOLOGY: Primary | ICD-10-CM

## 2022-09-23 RX ORDER — AMOXICILLIN 500 MG/1
500 CAPSULE ORAL EVERY 12 HOURS
Qty: 14 CAPSULE | Refills: 0 | Status: SHIPPED | OUTPATIENT
Start: 2022-09-26 | End: 2022-10-03

## 2022-10-06 ENCOUNTER — OFFICE VISIT (OUTPATIENT)
Dept: OBSTETRICS AND GYNECOLOGY | Facility: CLINIC | Age: 42
End: 2022-10-06
Payer: COMMERCIAL

## 2022-10-06 VITALS
HEIGHT: 69 IN | BODY MASS INDEX: 31.58 KG/M2 | WEIGHT: 213.19 LBS | SYSTOLIC BLOOD PRESSURE: 152 MMHG | HEART RATE: 63 BPM | DIASTOLIC BLOOD PRESSURE: 98 MMHG

## 2022-10-06 DIAGNOSIS — Z12.31 ENCOUNTER FOR SCREENING MAMMOGRAM FOR BREAST CANCER: ICD-10-CM

## 2022-10-06 DIAGNOSIS — R10.9 ABDOMINAL PAIN, UNSPECIFIED ABDOMINAL LOCATION: ICD-10-CM

## 2022-10-06 DIAGNOSIS — Z01.411 ENCOUNTER FOR GYNECOLOGICAL EXAMINATION (GENERAL) (ROUTINE) WITH ABNORMAL FINDINGS: Primary | ICD-10-CM

## 2022-10-06 DIAGNOSIS — N89.8 VAGINAL DISCHARGE: ICD-10-CM

## 2022-10-06 PROCEDURE — 81514 NFCT DS BV&VAGINITIS DNA ALG: CPT | Performed by: OBSTETRICS & GYNECOLOGY

## 2022-10-06 PROCEDURE — 3044F HG A1C LEVEL LT 7.0%: CPT | Mod: CPTII,S$GLB,, | Performed by: OBSTETRICS & GYNECOLOGY

## 2022-10-06 PROCEDURE — 3080F DIAST BP >= 90 MM HG: CPT | Mod: CPTII,S$GLB,, | Performed by: OBSTETRICS & GYNECOLOGY

## 2022-10-06 PROCEDURE — 3008F BODY MASS INDEX DOCD: CPT | Mod: CPTII,S$GLB,, | Performed by: OBSTETRICS & GYNECOLOGY

## 2022-10-06 PROCEDURE — 3077F PR MOST RECENT SYSTOLIC BLOOD PRESSURE >= 140 MM HG: ICD-10-PCS | Mod: CPTII,S$GLB,, | Performed by: OBSTETRICS & GYNECOLOGY

## 2022-10-06 PROCEDURE — 3008F PR BODY MASS INDEX (BMI) DOCUMENTED: ICD-10-PCS | Mod: CPTII,S$GLB,, | Performed by: OBSTETRICS & GYNECOLOGY

## 2022-10-06 PROCEDURE — 99396 PR PREVENTIVE VISIT,EST,40-64: ICD-10-PCS | Mod: S$GLB,,, | Performed by: OBSTETRICS & GYNECOLOGY

## 2022-10-06 PROCEDURE — 99999 PR PBB SHADOW E&M-EST. PATIENT-LVL III: CPT | Mod: PBBFAC,,, | Performed by: OBSTETRICS & GYNECOLOGY

## 2022-10-06 PROCEDURE — 1160F PR REVIEW ALL MEDS BY PRESCRIBER/CLIN PHARMACIST DOCUMENTED: ICD-10-PCS | Mod: CPTII,S$GLB,, | Performed by: OBSTETRICS & GYNECOLOGY

## 2022-10-06 PROCEDURE — 1159F PR MEDICATION LIST DOCUMENTED IN MEDICAL RECORD: ICD-10-PCS | Mod: CPTII,S$GLB,, | Performed by: OBSTETRICS & GYNECOLOGY

## 2022-10-06 PROCEDURE — 1159F MED LIST DOCD IN RCRD: CPT | Mod: CPTII,S$GLB,, | Performed by: OBSTETRICS & GYNECOLOGY

## 2022-10-06 PROCEDURE — 99396 PREV VISIT EST AGE 40-64: CPT | Mod: S$GLB,,, | Performed by: OBSTETRICS & GYNECOLOGY

## 2022-10-06 PROCEDURE — 3044F PR MOST RECENT HEMOGLOBIN A1C LEVEL <7.0%: ICD-10-PCS | Mod: CPTII,S$GLB,, | Performed by: OBSTETRICS & GYNECOLOGY

## 2022-10-06 PROCEDURE — 3077F SYST BP >= 140 MM HG: CPT | Mod: CPTII,S$GLB,, | Performed by: OBSTETRICS & GYNECOLOGY

## 2022-10-06 PROCEDURE — 3080F PR MOST RECENT DIASTOLIC BLOOD PRESSURE >= 90 MM HG: ICD-10-PCS | Mod: CPTII,S$GLB,, | Performed by: OBSTETRICS & GYNECOLOGY

## 2022-10-06 PROCEDURE — 1160F RVW MEDS BY RX/DR IN RCRD: CPT | Mod: CPTII,S$GLB,, | Performed by: OBSTETRICS & GYNECOLOGY

## 2022-10-06 PROCEDURE — 99999 PR PBB SHADOW E&M-EST. PATIENT-LVL III: ICD-10-PCS | Mod: PBBFAC,,, | Performed by: OBSTETRICS & GYNECOLOGY

## 2022-10-06 RX ORDER — CITALOPRAM 10 MG/1
TABLET ORAL
COMMUNITY
Start: 2022-10-05 | End: 2023-03-17

## 2022-10-06 NOTE — PROGRESS NOTES
"Past medical, surgical, social, family, and obstetric histories; medications; prior records and results; and available outside records were reviewed and updated in the EMR.  Pertinent findings were noted below.    Reason for Visit   Well Woman and Vaginal Discharge    HPI   42 y.o. female     New patient: Yes    Menopausal: No    History of abnormal paps: DENIES  Abnormal or postmenopausal bleeding: DENIES  History of abnormal mammograms:DENIES   Family history of breast or ovarian cancer:  PT'S MOTHER RECENTLY DIAGNOSED WITH BREAST CANCER.  Any breast masses, pain, skin changes, or nipple discharge: DENIES  Contraception: s/p hysterectomy    NOTES SOME LOWER ABD PAIN  NO DYSURIA, CONSTIPATION, DIARRHEA, OR VAGINAL BLEEDING  VAGINAL DISCHARGE.  NO ODOR OR IRRITATION    Pap: 3/23/2021, NILM  Mammogram: BIRADS1, T-C=15.65%, 2022  Allergies: Patient has no known allergies.    Review of Systems   Constitutional:  Negative for fever.   Respiratory:  Negative for shortness of breath.    Cardiovascular:  Negative for chest pain.   Gastrointestinal:  Positive for abdominal pain. Negative for nausea and vomiting.   Endocrine: Negative for hot flashes.   Genitourinary:  Negative for menstrual problem.   Integumentary:  Negative for breast mass, nipple discharge and breast skin changes.   Neurological:  Negative for headaches.   Hematological:  Does not bruise/bleed easily.   Psychiatric/Behavioral:  Negative for depression.    Breast: Negative for mass, mastodynia, nipple discharge and skin changes    Exam   BP (!) 152/98   Pulse 63   Ht 5' 9" (1.753 m)   Wt 96.7 kg (213 lb 3 oz)   LMP 2021 (Approximate)   BMI 31.48 kg/m²     Physical Exam  Constitutional:       General: She is not in acute distress.     Appearance: She is well-developed.   Genitourinary:      Bladder and urethral meatus normal.      Right Labia: No rash, lesions or Bartholin's cyst.     Left Labia: No lesions, Bartholin's cyst or rash.    "  Vaginal cuff intact.     No vaginal discharge or bleeding.        Right Adnexa: not tender and not full.     Left Adnexa: not tender and not full.     Cervix is absent.      Uterus is absent.      No urethral tenderness present.   Breasts:     Breasts are symmetrical.      Right: No mass, nipple discharge, skin change or tenderness.      Left: No mass, nipple discharge, skin change or tenderness.   Neck:      Thyroid: No thyroid mass.   Cardiovascular:      Rate and Rhythm: Normal rate.   Pulmonary:      Effort: Pulmonary effort is normal. No respiratory distress.   Abdominal:      General: There is no distension.      Palpations: There is no hepatomegaly, splenomegaly or mass.      Tenderness: There is abdominal tenderness in the left lower quadrant.      Hernia: No hernia is present.   Musculoskeletal:      Cervical back: Normal range of motion.      Right lower leg: No edema.      Left lower leg: No edema.   Lymphadenopathy:      Cervical: No cervical adenopathy.      Upper Body:      Right upper body: No axillary adenopathy.      Left upper body: No axillary adenopathy.   Neurological:      Mental Status: She is alert and oriented to person, place, and time.   Skin:     Findings: No rash.   Psychiatric:         Mood and Affect: Mood and affect normal.   Exam conducted with a chaperone present.     Assessment and Plan   Encounter for gynecological examination (general) (routine) with abnormal findings    Encounter for screening mammogram for breast cancer  -     Mammo Digital Screening Bilat w/ [facility]; Future; Expected date: 10/06/2022    Abdominal pain, unspecified abdominal location  -     US Pelvis Comp with Transvag NON-OB (xpd; Future; Expected date: 10/06/2022    Vaginal discharge  -     Vaginosis Screen by DNA Probe      Annual exam  Breast and pelvic exam: NORMAL EXCEPT AS BELOW  Patient was counseled on ASCCP guidelines for cervical cytology screening  Cervical screening: NOT INDICATED.  S/P HYSTERECTOMY.   NO CIN2 OR 3 ON PATH.  Patient was counseled on current recommendations for breast cancer screening  Mammogram screening: WILL SCHEDULE IN 1/2023  MILD LLQ PAIN ON EXAM.  NO ADNEXAL TENDERNESS.  CHECK PELVIC U/S.  IF (-), RECOMMEND F/U WITH PCP.  AFFIRM.  WILL TREAT BASED ON RESULTS.    She was counseled to follow up with her PCP for other routine health maintenance

## 2022-10-08 LAB
BACTERIAL VAGINOSIS DNA: NEGATIVE
CANDIDA GLABRATA DNA: NEGATIVE
CANDIDA KRUSEI DNA: NEGATIVE
CANDIDA RRNA VAG QL PROBE: POSITIVE
T VAGINALIS RRNA GENITAL QL PROBE: NEGATIVE

## 2022-10-11 ENCOUNTER — PATIENT MESSAGE (OUTPATIENT)
Dept: OBSTETRICS AND GYNECOLOGY | Facility: CLINIC | Age: 42
End: 2022-10-11
Payer: COMMERCIAL

## 2022-10-11 RX ORDER — TERCONAZOLE 4 MG/G
1 CREAM VAGINAL NIGHTLY
Qty: 7 G | Refills: 0 | Status: SHIPPED | OUTPATIENT
Start: 2022-10-11 | End: 2023-03-17

## 2022-10-13 ENCOUNTER — OFFICE VISIT (OUTPATIENT)
Dept: INTERNAL MEDICINE | Facility: CLINIC | Age: 42
End: 2022-10-13
Payer: COMMERCIAL

## 2022-10-13 ENCOUNTER — IMMUNIZATION (OUTPATIENT)
Dept: INTERNAL MEDICINE | Facility: CLINIC | Age: 42
End: 2022-10-13
Payer: COMMERCIAL

## 2022-10-13 VITALS
HEIGHT: 69 IN | SYSTOLIC BLOOD PRESSURE: 143 MMHG | BODY MASS INDEX: 31.28 KG/M2 | HEART RATE: 65 BPM | WEIGHT: 211.19 LBS | DIASTOLIC BLOOD PRESSURE: 96 MMHG

## 2022-10-13 DIAGNOSIS — G89.29 CHRONIC BILATERAL LOWER ABDOMINAL PAIN: ICD-10-CM

## 2022-10-13 DIAGNOSIS — I10 PRIMARY HYPERTENSION: ICD-10-CM

## 2022-10-13 DIAGNOSIS — Z00.00 ENCOUNTER FOR SCREENING AND PREVENTATIVE CARE: Primary | ICD-10-CM

## 2022-10-13 DIAGNOSIS — F32.0 CURRENT MILD EPISODE OF MAJOR DEPRESSIVE DISORDER, UNSPECIFIED WHETHER RECURRENT: ICD-10-CM

## 2022-10-13 DIAGNOSIS — F41.9 ANXIETY: ICD-10-CM

## 2022-10-13 DIAGNOSIS — F32.0 CURRENT MILD EPISODE OF MAJOR DEPRESSIVE DISORDER WITHOUT PRIOR EPISODE: ICD-10-CM

## 2022-10-13 DIAGNOSIS — R10.31 CHRONIC BILATERAL LOWER ABDOMINAL PAIN: ICD-10-CM

## 2022-10-13 DIAGNOSIS — R10.32 CHRONIC BILATERAL LOWER ABDOMINAL PAIN: ICD-10-CM

## 2022-10-13 DIAGNOSIS — R10.30 LOWER ABDOMINAL PAIN: ICD-10-CM

## 2022-10-13 DIAGNOSIS — I10 ESSENTIAL HYPERTENSION: ICD-10-CM

## 2022-10-13 PROBLEM — F32.A DEPRESSION: Status: ACTIVE | Noted: 2022-10-13

## 2022-10-13 PROCEDURE — 90471 FLU VACCINE (QUAD) GREATER THAN OR EQUAL TO 3YO PRESERVATIVE FREE IM: ICD-10-PCS | Mod: S$GLB,,, | Performed by: STUDENT IN AN ORGANIZED HEALTH CARE EDUCATION/TRAINING PROGRAM

## 2022-10-13 PROCEDURE — 90686 IIV4 VACC NO PRSV 0.5 ML IM: CPT | Mod: S$GLB,,, | Performed by: STUDENT IN AN ORGANIZED HEALTH CARE EDUCATION/TRAINING PROGRAM

## 2022-10-13 PROCEDURE — 90471 IMMUNIZATION ADMIN: CPT | Mod: S$GLB,,, | Performed by: STUDENT IN AN ORGANIZED HEALTH CARE EDUCATION/TRAINING PROGRAM

## 2022-10-13 PROCEDURE — 1159F PR MEDICATION LIST DOCUMENTED IN MEDICAL RECORD: ICD-10-PCS | Mod: CPTII,S$GLB,, | Performed by: STUDENT IN AN ORGANIZED HEALTH CARE EDUCATION/TRAINING PROGRAM

## 2022-10-13 PROCEDURE — 3077F PR MOST RECENT SYSTOLIC BLOOD PRESSURE >= 140 MM HG: ICD-10-PCS | Mod: CPTII,S$GLB,, | Performed by: STUDENT IN AN ORGANIZED HEALTH CARE EDUCATION/TRAINING PROGRAM

## 2022-10-13 PROCEDURE — 3080F DIAST BP >= 90 MM HG: CPT | Mod: CPTII,S$GLB,, | Performed by: STUDENT IN AN ORGANIZED HEALTH CARE EDUCATION/TRAINING PROGRAM

## 2022-10-13 PROCEDURE — 3044F HG A1C LEVEL LT 7.0%: CPT | Mod: CPTII,S$GLB,, | Performed by: STUDENT IN AN ORGANIZED HEALTH CARE EDUCATION/TRAINING PROGRAM

## 2022-10-13 PROCEDURE — 1159F MED LIST DOCD IN RCRD: CPT | Mod: CPTII,S$GLB,, | Performed by: STUDENT IN AN ORGANIZED HEALTH CARE EDUCATION/TRAINING PROGRAM

## 2022-10-13 PROCEDURE — 99999 PR PBB SHADOW E&M-EST. PATIENT-LVL III: CPT | Mod: PBBFAC,,, | Performed by: STUDENT IN AN ORGANIZED HEALTH CARE EDUCATION/TRAINING PROGRAM

## 2022-10-13 PROCEDURE — 99213 PR OFFICE/OUTPT VISIT, EST, LEVL III, 20-29 MIN: ICD-10-PCS | Mod: S$GLB,,, | Performed by: STUDENT IN AN ORGANIZED HEALTH CARE EDUCATION/TRAINING PROGRAM

## 2022-10-13 PROCEDURE — 3080F PR MOST RECENT DIASTOLIC BLOOD PRESSURE >= 90 MM HG: ICD-10-PCS | Mod: CPTII,S$GLB,, | Performed by: STUDENT IN AN ORGANIZED HEALTH CARE EDUCATION/TRAINING PROGRAM

## 2022-10-13 PROCEDURE — 99213 OFFICE O/P EST LOW 20 MIN: CPT | Mod: S$GLB,,, | Performed by: STUDENT IN AN ORGANIZED HEALTH CARE EDUCATION/TRAINING PROGRAM

## 2022-10-13 PROCEDURE — 3044F PR MOST RECENT HEMOGLOBIN A1C LEVEL <7.0%: ICD-10-PCS | Mod: CPTII,S$GLB,, | Performed by: STUDENT IN AN ORGANIZED HEALTH CARE EDUCATION/TRAINING PROGRAM

## 2022-10-13 PROCEDURE — 99999 PR PBB SHADOW E&M-EST. PATIENT-LVL III: ICD-10-PCS | Mod: PBBFAC,,, | Performed by: STUDENT IN AN ORGANIZED HEALTH CARE EDUCATION/TRAINING PROGRAM

## 2022-10-13 PROCEDURE — 3077F SYST BP >= 140 MM HG: CPT | Mod: CPTII,S$GLB,, | Performed by: STUDENT IN AN ORGANIZED HEALTH CARE EDUCATION/TRAINING PROGRAM

## 2022-10-13 PROCEDURE — 90686 FLU VACCINE (QUAD) GREATER THAN OR EQUAL TO 3YO PRESERVATIVE FREE IM: ICD-10-PCS | Mod: S$GLB,,, | Performed by: STUDENT IN AN ORGANIZED HEALTH CARE EDUCATION/TRAINING PROGRAM

## 2022-10-13 RX ORDER — AMLODIPINE BESYLATE 5 MG/1
5 TABLET ORAL DAILY
Qty: 30 TABLET | Refills: 11 | Status: SHIPPED | OUTPATIENT
Start: 2022-10-13 | End: 2023-03-17

## 2022-10-13 NOTE — PROGRESS NOTES
"42 year old female is here for annual.     Lower abdominal pain  Complains of lower abdominal pain today. Located on both sides  of the lower abdomen, but left>right. Has been going on for two years. Intermittent. Activity triggers it, but pt is unsure. 4/10 in intensity. Works out 3-4 times/week. Not associated with food. Denies nausea, vomiting, diarrhea, hematochezia, fever, dysruria, changes in bowel and bladder habits, flank pain, tenesmus, family hx of autoimmune disease. Had colonoscopy done in 06/2022 that did not show particular etiology. S/p hysterectomy in in 06/2021. No complication after procedure. Had tummy tuck procedure in 2015.     HTN  BP is elevated today. Denies blurry vision, chest pain, focal neurological deficits at this time.     Migraine   Has occassional migraine.  Well controlled with naproxen.     Mental Health  Reports anxiety today. Was on citalopram, but stopped taking it recently because she thought that is driving HTN. Also reports depression. UNA score 12 and PHQ 9 score 8.     Social  Has 3 kids. Pt is struggling with mother responsibility. Weighs on her.  works "weird hours". Has established care with psychologist. Exercises and tries to maintain a healthy diet.     Preventative  Scheduled for mammogram. S/p hysterectomy with cervix removal. Due for flu vaccine.        Past Medical History:   Diagnosis Date    Migraine headache     with aura    Mitral valve prolapse 2012    PONV (postoperative nausea and vomiting)      Past Surgical History:   Procedure Laterality Date    ARTHROSCOPIC CHONDROPLASTY OF KNEE JOINT Left 8/24/2018    Procedure: ARTHROSCOPY, KNEE, WITH CHONDROPLASTY;  Surgeon: Konrad Mckeon MD;  Location: [facility] OR;  Service: Orthopedics;  Laterality: Left;    ARTHROSCOPY OF KNEE Left 8/24/2018    Procedure: ARTHROSCOPY, KNEE;  Surgeon: Konrad Mckeon MD;  Location: [facility] OR;  Service: Orthopedics;  Laterality: Left;    Breast Reduction      COLONOSCOPY N/A " 6/1/2022    Procedure: COLONOSCOPY;  Surgeon: Didier Contreras MD;  Location: [facility] ([address] [place] [address]);  Service: Endoscopy;  Laterality: N/A;  fully vaccinated-GT    CYSTOSCOPY N/A 6/23/2021    Procedure: CYSTOSCOPY;  Surgeon: Puja Hearn MD;  Location: [facility] OR;  Service: OB/GYN;  Laterality: N/A;    HYSTERECTOMY      6/2020    LATERAL RETINACULA RELEASE OF KNEE Left 8/24/2018    Procedure: RELEASE, KNEE, LATERAL RETINACULA;  Surgeon: Konrad Mckeon MD;  Location: [facility];  Service: Orthopedics;  Laterality: Left;    ROBOT-ASSISTED LAPAROSCOPIC HYSTERECTOMY N/A 6/23/2021    Procedure: ROBOTIC HYSTERECTOMY;  Surgeon: Puja Hearn MD;  Location: [facility] OR;  Service: OB/GYN;  Laterality: N/A;    SALPINGECTOMY Bilateral 6/23/2021    Procedure: SALPINGECTOMY;  Surgeon: Puja Hearn MD;  Location: [facility] OR;  Service: OB/GYN;  Laterality: Bilateral;    TOTAL REDUCTION MAMMOPLASTY      2010    TUBAL LIGATION      tummy tuck       Review of patient's allergies indicates:  No Known Allergies    Current Outpatient Medications on File Prior to Visit   Medication Sig Dispense Refill    terconazole (TERAZOL 7) 0.4 % Crea Place 1 applicator vaginally every evening. 7 g 0    citalopram (CELEXA) 10 MG tablet       naproxen (NAPROSYN) 500 MG tablet Take 1 tablet (500 mg total) by mouth 2 (two) times daily. (Patient not taking: Reported on 10/13/2022) 30 tablet 0     No current facility-administered medications on file prior to visit.     Social History     Socioeconomic History    Marital status:    Tobacco Use    Smoking status: Never    Smokeless tobacco: Never   Substance and Sexual Activity    Alcohol use: Yes     Comment: occasional    Drug use: Not Currently     Types: Marijuana     Comment: During weekends 1-2/week. Quit 3 years ago.    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      Review of Systems    Constitutional:  Negative for fever, malaise/fatigue and weight loss.   HENT:  Negative for congestion and hearing loss.    Eyes:  Negative for blurred vision and redness.   Respiratory:  Negative for cough and shortness of breath.    Cardiovascular:  Negative for chest pain, palpitations and leg swelling.   Gastrointestinal:  Negative for abdominal pain, diarrhea, heartburn, nausea and vomiting.   Genitourinary:  Negative for dysuria, flank pain and hematuria.   Musculoskeletal:  Negative for back pain, joint pain, myalgias and neck pain.   Skin:  Negative for rash.   Neurological:  Negative for dizziness, tingling, tremors, sensory change, focal weakness, seizures, loss of consciousness, weakness and headaches.   Endo/Heme/Allergies:  Does not bruise/bleed easily.   Psychiatric/Behavioral:  Positive for depression. Negative for hallucinations, substance abuse and suicidal ideas. The patient is nervous/anxious. The patient does not have insomnia.      Vitals:    10/13/22 0817   BP: (!) 143/96   Pulse: 65     Physical Exam  HENT:      Head: Normocephalic and atraumatic.   Eyes:      Conjunctiva/sclera: Conjunctivae normal.      Pupils: Pupils are equal, round, and reactive to light.   Cardiovascular:      Rate and Rhythm: Normal rate and regular rhythm.   Pulmonary:      Effort: Pulmonary effort is normal.      Breath sounds: Normal breath sounds.   Abdominal:      General: Bowel sounds are normal. There is no distension.      Palpations: Abdomen is soft. There is no mass.      Tenderness: There is abdominal tenderness. There is no right CVA tenderness, left CVA tenderness, guarding or rebound.      Hernia: No hernia is present.      Comments: TTP on deep palpation at b/l lower quadrant of the abdomen where surgical site of tummy tuck procedure is.   Multiple post arthroscopic procedure is noted at the abdomen.    Musculoskeletal:         General: Normal range of motion.      Cervical back: Normal range of motion  and neck supple.   Skin:     General: Skin is warm and dry.   Neurological:      Mental Status: She is alert and oriented to person, place, and time.      Gait: Gait is intact.   Psychiatric:         Mood and Affect: Mood and affect normal.         Cognition and Memory: Memory normal.         Judgment: Judgment normal.     A/P     Essential hypertension  BP elevated on multiple episodes. Anxiety is the likely contributor. Will start amlodopine.   -     amLODIPine (NORVASC) 5 MG tablet; Take 1 tablet (5 mg total) by mouth once daily.  Dispense: 30 tablet; Refill: 11    Anxiety  Moderate anxiety. Reassured pt that citalopram is likely not increasing bp. Asked to restart med. She has supplies.     - Continue citalopram.   - Continue psychotherapy.   - F/u in 6 weeks to monitor progress.     Current mild episode of major depressive disorder without prior episode  - Continue citalopram.   - No SI/HI  - See anxiety.     Lower abdominal pain  Given colonscopy is negative, chronic nature, and multiple abdominal surgery including tummy tuck procedure where pain is occurring, this is likely pain from abdominal wall. Reassured patient.   - Continue monitoring.     Preventative  Flu vaccine given. Mammogram scheduled.

## 2022-10-13 NOTE — PROGRESS NOTES
I have reviewed the notes, assessments, and/or procedures performed by the resident, I concur with her/his documentation of Desire Kilpatrick.

## 2022-12-07 ENCOUNTER — PATIENT MESSAGE (OUTPATIENT)
Dept: GASTROENTEROLOGY | Facility: CLINIC | Age: 42
End: 2022-12-07
Payer: COMMERCIAL

## 2023-02-16 ENCOUNTER — OFFICE VISIT (OUTPATIENT)
Dept: INTERNAL MEDICINE | Facility: CLINIC | Age: 43
End: 2023-02-16
Payer: COMMERCIAL

## 2023-02-16 ENCOUNTER — TELEPHONE (OUTPATIENT)
Dept: INTERNAL MEDICINE | Facility: CLINIC | Age: 43
End: 2023-02-16
Payer: COMMERCIAL

## 2023-02-16 ENCOUNTER — HOSPITAL ENCOUNTER (EMERGENCY)
Facility: HOSPITAL | Age: 43
Discharge: HOME | End: 2023-02-16
Attending: EMERGENCY MEDICINE
Payer: COMMERCIAL

## 2023-02-16 VITALS
HEART RATE: 69 BPM | DIASTOLIC BLOOD PRESSURE: 93 MMHG | TEMPERATURE: 99 F | SYSTOLIC BLOOD PRESSURE: 160 MMHG | BODY MASS INDEX: 31.16 KG/M2 | RESPIRATION RATE: 18 BRPM | OXYGEN SATURATION: 100 % | WEIGHT: 211 LBS

## 2023-02-16 VITALS
HEART RATE: 76 BPM | DIASTOLIC BLOOD PRESSURE: 80 MMHG | OXYGEN SATURATION: 100 % | HEIGHT: 69 IN | SYSTOLIC BLOOD PRESSURE: 128 MMHG | WEIGHT: 218.06 LBS | BODY MASS INDEX: 32.3 KG/M2

## 2023-02-16 DIAGNOSIS — R10.31 RIGHT LOWER QUADRANT ABDOMINAL PAIN: Primary | ICD-10-CM

## 2023-02-16 DIAGNOSIS — N83.11 CORPUS LUTEUM CYST OF RIGHT OVARY: Primary | ICD-10-CM

## 2023-02-16 DIAGNOSIS — R10.0 ACUTE ABDOMEN: ICD-10-CM

## 2023-02-16 LAB
ALBUMIN SERPL BCP-MCNC: 4 G/DL (ref 3.5–5.2)
ALP SERPL-CCNC: 47 U/L (ref 55–135)
ALT SERPL W/O P-5'-P-CCNC: 16 U/L (ref 10–44)
ANION GAP SERPL CALC-SCNC: 9 MMOL/L (ref 8–16)
AST SERPL-CCNC: 20 U/L (ref 10–40)
BASOPHILS # BLD AUTO: 0.06 K/UL (ref 0–0.2)
BASOPHILS NFR BLD: 0.7 % (ref 0–1.9)
BILIRUB SERPL-MCNC: 0.4 MG/DL (ref 0.1–1)
BILIRUB UR QL STRIP: NEGATIVE
BUN SERPL-MCNC: 11 MG/DL (ref 6–20)
BUN SERPL-MCNC: 11 MG/DL (ref 6–30)
CALCIUM SERPL-MCNC: 9.5 MG/DL (ref 8.7–10.5)
CHLORIDE SERPL-SCNC: 104 MMOL/L (ref 95–110)
CHLORIDE SERPL-SCNC: 106 MMOL/L (ref 95–110)
CLARITY UR REFRACT.AUTO: CLEAR
CO2 SERPL-SCNC: 23 MMOL/L (ref 23–29)
COLOR UR AUTO: COLORLESS
CREAT SERPL-MCNC: 0.6 MG/DL (ref 0.5–1.4)
CREAT SERPL-MCNC: 0.9 MG/DL (ref 0.5–1.4)
DIFFERENTIAL METHOD: ABNORMAL
EOSINOPHIL # BLD AUTO: 0.1 K/UL (ref 0–0.5)
EOSINOPHIL NFR BLD: 1.6 % (ref 0–8)
ERYTHROCYTE [DISTWIDTH] IN BLOOD BY AUTOMATED COUNT: 13.3 % (ref 11.5–14.5)
EST. GFR  (NO RACE VARIABLE): >60 ML/MIN/1.73 M^2
GLUCOSE SERPL-MCNC: 78 MG/DL (ref 70–110)
GLUCOSE SERPL-MCNC: 81 MG/DL (ref 70–110)
GLUCOSE UR QL STRIP: NEGATIVE
HCT VFR BLD AUTO: 39.6 % (ref 37–48.5)
HCT VFR BLD CALC: 41 %PCV (ref 36–54)
HCV AB SERPL QL IA: NORMAL
HGB BLD-MCNC: 13.1 G/DL (ref 12–16)
HGB UR QL STRIP: NEGATIVE
HIV 1+2 AB+HIV1 P24 AG SERPL QL IA: NORMAL
IMM GRANULOCYTES # BLD AUTO: 0.02 K/UL (ref 0–0.04)
IMM GRANULOCYTES NFR BLD AUTO: 0.2 % (ref 0–0.5)
KETONES UR QL STRIP: NEGATIVE
LEUKOCYTE ESTERASE UR QL STRIP: NEGATIVE
LYMPHOCYTES # BLD AUTO: 2.8 K/UL (ref 1–4.8)
LYMPHOCYTES NFR BLD: 32.1 % (ref 18–48)
MCH RBC QN AUTO: 26.1 PG (ref 27–31)
MCHC RBC AUTO-ENTMCNC: 33.1 G/DL (ref 32–36)
MCV RBC AUTO: 79 FL (ref 82–98)
MONOCYTES # BLD AUTO: 0.6 K/UL (ref 0.3–1)
MONOCYTES NFR BLD: 6.5 % (ref 4–15)
NEUTROPHILS # BLD AUTO: 5.1 K/UL (ref 1.8–7.7)
NEUTROPHILS NFR BLD: 58.9 % (ref 38–73)
NITRITE UR QL STRIP: NEGATIVE
NRBC BLD-RTO: 0 /100 WBC
PH UR STRIP: 6 [PH] (ref 5–8)
PLATELET # BLD AUTO: 177 K/UL (ref 150–450)
PMV BLD AUTO: 10.1 FL (ref 9.2–12.9)
POC IONIZED CALCIUM: 1.16 MMOL/L (ref 1.06–1.42)
POC TCO2 (MEASURED): 22 MMOL/L (ref 23–29)
POTASSIUM BLD-SCNC: 3.4 MMOL/L (ref 3.5–5.1)
POTASSIUM SERPL-SCNC: 3.8 MMOL/L (ref 3.5–5.1)
PROT SERPL-MCNC: 7.7 G/DL (ref 6–8.4)
PROT UR QL STRIP: NEGATIVE
RBC # BLD AUTO: 5.01 M/UL (ref 4–5.4)
SAMPLE: ABNORMAL
SODIUM BLD-SCNC: 139 MMOL/L (ref 136–145)
SODIUM SERPL-SCNC: 138 MMOL/L (ref 136–145)
SP GR UR STRIP: 1.01 (ref 1–1.03)
URN SPEC COLLECT METH UR: ABNORMAL
WBC # BLD AUTO: 8.58 K/UL (ref 3.9–12.7)

## 2023-02-16 PROCEDURE — 99214 OFFICE O/P EST MOD 30 MIN: CPT | Mod: S$GLB,,,

## 2023-02-16 PROCEDURE — 85025 COMPLETE CBC W/AUTO DIFF WBC: CPT | Performed by: PHYSICIAN ASSISTANT

## 2023-02-16 PROCEDURE — 96361 HYDRATE IV INFUSION ADD-ON: CPT

## 2023-02-16 PROCEDURE — 99284 PR EMERGENCY DEPT VISIT,LEVEL IV: ICD-10-PCS | Mod: ,,, | Performed by: PHYSICIAN ASSISTANT

## 2023-02-16 PROCEDURE — 99284 EMERGENCY DEPT VISIT MOD MDM: CPT | Mod: ,,, | Performed by: PHYSICIAN ASSISTANT

## 2023-02-16 PROCEDURE — 3008F PR BODY MASS INDEX (BMI) DOCUMENTED: ICD-10-PCS | Mod: CPTII,S$GLB,,

## 2023-02-16 PROCEDURE — 3008F BODY MASS INDEX DOCD: CPT | Mod: CPTII,S$GLB,,

## 2023-02-16 PROCEDURE — 99999 PR PBB SHADOW E&M-EST. PATIENT-LVL III: CPT | Mod: PBBFAC,,,

## 2023-02-16 PROCEDURE — 82330 ASSAY OF CALCIUM: CPT

## 2023-02-16 PROCEDURE — 3079F PR MOST RECENT DIASTOLIC BLOOD PRESSURE 80-89 MM HG: ICD-10-PCS | Mod: CPTII,S$GLB,,

## 2023-02-16 PROCEDURE — 63600175 PHARM REV CODE 636 W HCPCS: Performed by: PHYSICIAN ASSISTANT

## 2023-02-16 PROCEDURE — 86803 HEPATITIS C AB TEST: CPT | Performed by: PHYSICIAN ASSISTANT

## 2023-02-16 PROCEDURE — 3074F SYST BP LT 130 MM HG: CPT | Mod: CPTII,S$GLB,,

## 2023-02-16 PROCEDURE — 81003 URINALYSIS AUTO W/O SCOPE: CPT | Performed by: PHYSICIAN ASSISTANT

## 2023-02-16 PROCEDURE — 99214 PR OFFICE/OUTPT VISIT, EST, LEVL IV, 30-39 MIN: ICD-10-PCS | Mod: S$GLB,,,

## 2023-02-16 PROCEDURE — 3079F DIAST BP 80-89 MM HG: CPT | Mod: CPTII,S$GLB,,

## 2023-02-16 PROCEDURE — 99999 PR PBB SHADOW E&M-EST. PATIENT-LVL III: ICD-10-PCS | Mod: PBBFAC,,,

## 2023-02-16 PROCEDURE — 99285 EMERGENCY DEPT VISIT HI MDM: CPT | Mod: 25

## 2023-02-16 PROCEDURE — 87389 HIV-1 AG W/HIV-1&-2 AB AG IA: CPT | Performed by: PHYSICIAN ASSISTANT

## 2023-02-16 PROCEDURE — 3074F PR MOST RECENT SYSTOLIC BLOOD PRESSURE < 130 MM HG: ICD-10-PCS | Mod: CPTII,S$GLB,,

## 2023-02-16 PROCEDURE — 25500020 PHARM REV CODE 255: Performed by: EMERGENCY MEDICINE

## 2023-02-16 PROCEDURE — 87591 N.GONORRHOEAE DNA AMP PROB: CPT | Performed by: PHYSICIAN ASSISTANT

## 2023-02-16 PROCEDURE — 80047 BASIC METABLC PNL IONIZED CA: CPT

## 2023-02-16 PROCEDURE — 96374 THER/PROPH/DIAG INJ IV PUSH: CPT | Mod: 59

## 2023-02-16 PROCEDURE — 80053 COMPREHEN METABOLIC PANEL: CPT | Performed by: PHYSICIAN ASSISTANT

## 2023-02-16 PROCEDURE — 25000003 PHARM REV CODE 250: Performed by: PHYSICIAN ASSISTANT

## 2023-02-16 RX ORDER — KETOROLAC TROMETHAMINE 30 MG/ML
10 INJECTION, SOLUTION INTRAMUSCULAR; INTRAVENOUS
Status: COMPLETED | OUTPATIENT
Start: 2023-02-16 | End: 2023-02-16

## 2023-02-16 RX ORDER — NAPROXEN 500 MG/1
500 TABLET ORAL 2 TIMES DAILY PRN
Qty: 10 TABLET | Refills: 0 | Status: SHIPPED | OUTPATIENT
Start: 2023-02-16 | End: 2023-05-09

## 2023-02-16 RX ADMIN — SODIUM CHLORIDE 1000 ML: 9 INJECTION, SOLUTION INTRAVENOUS at 06:02

## 2023-02-16 RX ADMIN — KETOROLAC TROMETHAMINE 10 MG: 30 INJECTION, SOLUTION INTRAMUSCULAR; INTRAVENOUS at 06:02

## 2023-02-16 RX ADMIN — IOHEXOL 100 ML: 350 INJECTION, SOLUTION INTRAVENOUS at 08:02

## 2023-02-16 NOTE — TELEPHONE ENCOUNTER
----- Message from Yuridia Yañez sent at 2023  9:08 AM CST -----  Contact: [phone] Patient  Patient is returning a phone call.  Who left a message for the patient: ?  Does patient know what this is regardin23 appt with Dr Odom?  Would you like a call back, or a response through your [facility] portal?:   call back  Comments:

## 2023-02-16 NOTE — PROGRESS NOTES
Clinic Note  2023      Subjective:       Patient ID:  Desire is a 42 y.o. female being seen for an established visit.    Chief Complaint: Abdominal Pain    HPI     Mrs. Kilpatrick is a 42-year-old female here complaining of abdominal pain that started 3 days ago, located in right lower quadrant, described as sharp and constant , progressively getting worse, today 8/10 in intensity nonradiating exacerbated by deep inspiration and ambulation.  Patient denies fever, chills, loss of appetite, nausea or vomiting, last bowel movement was this morning, denies dysuria, changes in odor or urine color, no recent illnesses.  Affirms feeling tired.      Patient has a surgical history of fibroids,  hysterectomy in , Abdominoplasty in 2018, , she is sexually active with , does not use protection.        Review of Systems   Constitutional:  Positive for malaise/fatigue. Negative for chills and fever.   HENT:  Negative for sore throat.    Respiratory:  Negative for cough and shortness of breath.    Cardiovascular:  Negative for chest pain and leg swelling.   Gastrointestinal:  Negative for abdominal pain, constipation (takes metamucil), diarrhea, nausea and vomiting.   Genitourinary:  Negative for dysuria, flank pain, frequency and hematuria.   Musculoskeletal:  Negative for back pain, joint pain and neck pain.   Neurological:  Negative for weakness and headaches.   Psychiatric/Behavioral:  Negative for depression.      Past Medical History:   Diagnosis Date    Migraine headache     with aura    Mitral valve prolapse     PONV (postoperative nausea and vomiting)        Family History   Problem Relation Age of Onset    Breast cancer Mother 68    Hypertension Mother     Hypertension Brother     Breast cancer Maternal Aunt     Stomach cancer Maternal Aunt     Breast cancer Maternal Aunt     Breast cancer Maternal Aunt     Lupus Maternal Grandmother     Cancer Paternal Grandmother         reports that she has  "never smoked. She has never used smokeless tobacco. She reports current alcohol use. She reports that she does not currently use drugs after having used the following drugs: Marijuana.    Medication List with Changes/Refills   Current Medications    AMLODIPINE (NORVASC) 5 MG TABLET    Take 1 tablet (5 mg total) by mouth once daily.    CITALOPRAM (CELEXA) 10 MG TABLET        NAPROXEN (NAPROSYN) 500 MG TABLET    Take 1 tablet (500 mg total) by mouth 2 (two) times daily.    TERCONAZOLE (TERAZOL 7) 0.4 % CREA    Place 1 applicator vaginally every evening.     Review of patient's allergies indicates:  No Known Allergies    Patient Active Problem List   Diagnosis    Chondromalacia of left patella    Uterine fibroid    Hypertension    Anxiety    Depression    Chronic bilateral lower abdominal pain           Objective:      /80 (BP Location: Right arm, Patient Position: Sitting, BP Method: Large (Manual))   Pulse 76   Ht 5' 9" (1.753 m)   Wt 98.9 kg (218 lb 0.6 oz)   LMP 06/07/2021 (Approximate)   SpO2 100%   BMI 32.20 kg/m²   Estimated body mass index is 32.2 kg/m² as calculated from the following:    Height as of this encounter: 5' 9" (1.753 m).    Weight as of this encounter: 98.9 kg (218 lb 0.6 oz).  Physical Exam  Vitals reviewed.   Constitutional:       General: She is not in acute distress.     Appearance: Normal appearance. She is ill-appearing (appears tired).   HENT:      Head: Normocephalic and atraumatic.      Right Ear: External ear normal.      Left Ear: External ear normal.      Mouth/Throat:      Mouth: Mucous membranes are moist.   Eyes:      General: No scleral icterus.  Pulmonary:      Effort: No respiratory distress.      Breath sounds: No stridor.   Abdominal:      General: There is no distension.      Palpations: There is no mass.      Tenderness: There is abdominal tenderness. There is guarding and rebound. There is no right CVA tenderness or left CVA tenderness.      Comments: RLQ " abdominal pain. Positive rebound in [place], obturator and psoas sign unimpressive   Musculoskeletal:      Right lower leg: No edema.      Left lower leg: No edema.   Skin:     General: Skin is warm.      Capillary Refill: Capillary refill takes less than 2 seconds.      Findings: No erythema or rash.   Neurological:      General: No focal deficit present.      Mental Status: She is alert and oriented to person, place, and time.   Psychiatric:         Mood and Affect: Mood normal.         Behavior: Behavior normal.       Assessment and Plan:         Desire was seen today for abdominal pain.  Physical exam concerning for acute abdomen rebound positive in the [place] point.  Patient states she has not experienced hot flashes, mood changes or signs characteristic of menopause.  We will see patient to the emergency department for further evaluation for acute abdomen. Patient agrees.Although very rare ectopic pregnancy after hysterectomy should be taken into consideration given the absence of menopause signs and symptoms      Right lower quadrant abdominal pain  -     Refer to Emergency Dept.    -Patient she feels comfortable driving to [facility] department for further eval. ED has been notified and referral placed.         No follow-ups on file.    Other Orders Placed This Visit:  Orders Placed This Encounter   Procedures    Refer to Emergency Dept.             BEVERLY Odom MD  PGY-1 Internal Medicine  [facility]

## 2023-02-16 NOTE — ED PROVIDER NOTES
Encounter Date: 2/16/2023       History     Chief Complaint   Patient presents with    Abdominal Pain     Right lower abdominal pain for the last 3 days     43 yo F PMHx of migraine headache who presents to the emergency department with chief complaint of RLQ abdominal pain that began three days ago. Has been constant since onset. Non radiating. Unrelieved with tylenol and NSAIDs. No NVD, dysuria, hematuria. Sexually active with . No concern for STI. Denies abnormal discharge.  She is status post hysterectomy. Denies symptoms of this nature in the past. Denies other worsening or alleviating factors.     Review of patient's allergies indicates:  No Known Allergies  Past Medical History:   Diagnosis Date    Migraine headache     with aura    Mitral valve prolapse 2012    PONV (postoperative nausea and vomiting)      Past Surgical History:   Procedure Laterality Date    ARTHROSCOPIC CHONDROPLASTY OF KNEE JOINT Left 8/24/2018    Procedure: ARTHROSCOPY, KNEE, WITH CHONDROPLASTY;  Surgeon: Konrad Mckeon MD;  Location: [facility];  Service: Orthopedics;  Laterality: Left;    ARTHROSCOPY OF KNEE Left 8/24/2018    Procedure: ARTHROSCOPY, KNEE;  Surgeon: Konrad Mckeon MD;  Location: [facility];  Service: Orthopedics;  Laterality: Left;    Breast Reduction      COLONOSCOPY N/A 6/1/2022    Procedure: COLONOSCOPY;  Surgeon: Didier Contreras MD;  Location: [address]);  Service: Endoscopy;  Laterality: N/A;  fully vaccinated-GT    CYSTOSCOPY N/A 6/23/2021    Procedure: CYSTOSCOPY;  Surgeon: Puja Hearn MD;  Location: [facility] OR;  Service: OB/GYN;  Laterality: N/A;    HYSTERECTOMY      6/2020    LATERAL RETINACULA RELEASE OF KNEE Left 8/24/2018    Procedure: RELEASE, KNEE, LATERAL RETINACULA;  Surgeon: Konrad Mckeon MD;  Location: [facility];  Service: Orthopedics;  Laterality: Left;    ROBOT-ASSISTED LAPAROSCOPIC HYSTERECTOMY N/A 6/23/2021    Procedure: ROBOTIC HYSTERECTOMY;  Surgeon: Puja Hearn MD;   Location: [facility] OR;  Service: OB/GYN;  Laterality: N/A;    SALPINGECTOMY Bilateral 6/23/2021    Procedure: SALPINGECTOMY;  Surgeon: Puja Hearn MD;  Location: [facility] OR;  Service: OB/GYN;  Laterality: Bilateral;    TOTAL REDUCTION MAMMOPLASTY      2010    TUBAL LIGATION      [facility]       Family History   Problem Relation Age of Onset    Breast cancer Mother 68    Hypertension Mother     Hypertension Brother     Breast cancer Maternal Aunt     Stomach cancer Maternal Aunt     Breast cancer Maternal Aunt     Breast cancer Maternal Aunt     Lupus Maternal Grandmother     Cancer Paternal Grandmother      Social History     Tobacco Use    Smoking status: Never    Smokeless tobacco: Never   Substance Use Topics    Alcohol use: Yes     Comment: occasional    Drug use: Not Currently     Types: Marijuana     Comment: During weekends 1-2/week. Quit 3 years ago.     Review of Systems   Gastrointestinal:  Positive for abdominal pain.     Physical Exam     Initial Vitals [02/16/23 1545]   BP Pulse Resp Temp SpO2   (!) 160/93 69 18 98.7 °F (37.1 °C) 100 %      MAP       --         Physical Exam    Nursing note and vitals reviewed.  Constitutional: She appears well-developed and well-nourished. She is not diaphoretic. No distress.   HENT:   Head: Normocephalic and atraumatic.   Mouth/Throat: Oropharynx is clear and moist.   Eyes: Conjunctivae and EOM are normal. Pupils are equal, round, and reactive to light.   Neck: Neck supple.   Normal range of motion.  Cardiovascular:  Normal rate, regular rhythm, normal heart sounds and intact distal pulses.     Exam reveals no gallop and no friction rub.       No murmur heard.  Pulmonary/Chest: Breath sounds normal. She has no wheezes. She has no rhonchi. She has no rales.   Abdominal: Abdomen is soft. Bowel sounds are normal. There is abdominal tenderness in the right lower quadrant.   Genitourinary:    Genitourinary Comments: Pelvic exam performed with nursing chaperone present.   Vaginal cuff intact.  Scant amount of discharge in vaginal vault.  No adnexal tenderness bilaterally.     Musculoskeletal:         General: Normal range of motion.      Cervical back: Normal range of motion and neck supple.     Neurological: She is alert and oriented to person, place, and time. She has normal strength. No cranial nerve deficit or sensory deficit. GCS score is 15. GCS eye subscore is 4. GCS verbal subscore is 5. GCS motor subscore is 6.   Skin: Skin is warm and dry. Capillary refill takes less than 2 seconds.   Psychiatric: She has a normal mood and affect. Her behavior is normal. Judgment and thought content normal.       ED Course   Procedures  Labs Reviewed   CBC W/ AUTO DIFFERENTIAL - Abnormal; Notable for the following components:       Result Value    MCV 79 (*)     MCH 26.1 (*)     All other components within normal limits   COMPREHENSIVE METABOLIC PANEL - Abnormal; Notable for the following components:    Alkaline Phosphatase 47 (*)     All other components within normal limits   URINALYSIS, REFLEX TO URINE CULTURE - Abnormal; Notable for the following components:    Color, UA Colorless (*)     All other components within normal limits    Narrative:     Specimen Source->Urine   ISTAT PROCEDURE - Abnormal; Notable for the following components:    POC Potassium 3.4 (*)     POC TCO2 (MEASURED) 22 (*)     All other components within normal limits   C. TRACHOMATIS/N. GONORRHOEAE BY AMP DNA   VAGINAL SCREEN   HIV 1 / 2 ANTIBODY    Narrative:     Release to patient->Immediate   HEPATITIS C ANTIBODY    Narrative:     Release to patient->Immediate   ISTAT CHEM8          Imaging Results              CT Abdomen Pelvis With Contrast (Final result)  Result time 02/16/23 20:51:39      Final result by Brendan Rodriguez MD (02/16/23 20:51:39)                   Impression:      Probable corpus luteum in the right ovary.    No evidence of appendicitis or other acute finding within the abdomen or  pelvis.      Electronically signed by: Brendan Rodriguez  Date:    02/16/2023  Time:    20:51               Narrative:    EXAMINATION:  CT ABDOMEN PELVIS WITH CONTRAST    CLINICAL HISTORY:  RLQ abdominal pain (Age >= 14y);    TECHNIQUE:  Low dose axial images, sagittal and coronal reformations were obtained from the lung bases to the pubic symphysis following the IV administration of 100 mL of Omnipaque 350 .  Oral contrast was not administered.    COMPARISON:  None.    FINDINGS:  Abdomen:    - Lower thorax:Base of the heart pericardium appear unremarkable.    - Lung bases: No infiltrates and no nodules.    - Liver: No focal mass.    - Gallbladder: No calcified gallstones.    - Bile Ducts: No evidence of intra or extra hepatic biliary ductal dilation.    - Spleen: Negative.    - Kidneys: No mass or hydronephrosis.    - Adrenals: Unremarkable.    - Pancreas: No mass or peripancreatic fat stranding.    - Retroperitoneum:  No significant adenopathy.    - Vascular: No abdominal aortic aneurysm.    - Abdominal wall:  Unremarkable.    Pelvis:    No pelvic mass, adenopathy, or free fluid.  Irregular enhancing structure in the right side of the cul-de-sac suggests hemorrhagic corpus luteum.    Bowel/Mesentery:    No evidence of bowel obstruction or inflammation.  Appendix not confidently identified.  There is no feature of appendicitis.    Bones:  No acute osseous abnormality and no suspicious lytic or blastic lesion.  Spondylosis at L4-5 and L5-1 with facet arthropathy but no significant central or foraminal stenosis.                                       Medications   sodium chloride 0.9% bolus 1,000 mL 1,000 mL (0 mLs Intravenous Stopped 2/16/23 1958)   ketorolac injection 9.999 mg (9.999 mg Intravenous Given 2/16/23 1851)   iohexoL (OMNIPAQUE 350) injection 100 mL (100 mLs Intravenous Given 2/16/23 2028)     Medical Decision Making:   History:   Old Medical Records: I decided to obtain old medical records.  Initial  Assessment:   Emergent evaluation of a 42 y.o. female presenting to the emergency department complaining of RLQ abdominal pain that has been constant for the last three days. Denies associated symptoms. Patient is afebrile, hemodynamically stable, and non toxic appearing.   Will order labs, imaging, analgesia.   Differential Diagnosis:   Differential diagnosis includes but isn't limited to appendicitis, ovarian cyst, ovarian torsion, colitis, urinary tract infection, pyelonephritis.  Clinical Tests:   Lab Tests: Ordered and Reviewed  Radiological Study: Ordered and Reviewed  ED Management:  Patient presenting with constant right lower quadrant abdominal pain that began 3 days ago.  No associated symptoms.  Sent from PCP with concern for appendicitis.  No severe metabolic or hematologic derangements.  UA without infection.  CT abdomen pelvis reveals probable corpus luteum cyst on the right.  No evidence of appendicitis noted.  Patient does report improvement of her symptoms with ED management.  Recommend outpatient follow-up with Gynecology.  Plan to discharge with naproxen.  Unfortunately, I am notified by patient's nurse that she has eloped.  She removed her IV prior to elopement.  She did not want to wait for a final discussion with me or for her discharge paperwork. Unable to discuss discharge instructions.  Her vaginal screen is also still pending.                ED Course as of 02/16/23 2227   Thu Feb 16, 2023   1843 WBC: 8.58 [JM]   1843 Hemoglobin: 13.1 [JM]   1843 Hematocrit: 39.6 [JM]   1843 Platelets: 177 [JM]   1903 BUN: 11 [JM]   1903 Creatinine: 0.9 [JM]      ED Course User Index  [JM] Taina Sellers PA-C                   Clinical Impression:   Final diagnoses:  [N83.11] Corpus luteum cyst of right ovary (Primary)        ED Disposition Condition    Discharge Stable          ED Prescriptions       Medication Sig Dispense Start Date End Date Auth. Provider    naproxen (NAPROSYN) 500 MG tablet Take  1 tablet (500 mg total) by mouth 2 (two) times daily as needed (pain). 10 tablet 2/16/2023 -- Taina Sellers PA-C          Follow-up Information       Follow up With Specialties Details Why Contact Info Additional Information    [facility] - Emergency Dept Emergency Medicine Go to  If symptoms worsen [address]  [facility] [address] [phone]     [facility] - OB GYN Obstetrics and Gynecology Schedule an appointment as soon as possible for a visit in 1 week  [address] [place] [id number] [phone] OB GYN - [facility], 5th Floor Please park in [place] and use [place] elevators             Tania Sellers PA-C  02/16/23 2283

## 2023-02-16 NOTE — PATIENT INSTRUCTIONS
Mrs. Kilpatrick is a 42-year-old female here complaining of abdominal pain that started 3 days ago, located in right lower quadrant, described as sharp and constant , progressively getting worse, today 8/10 in intensity nonradiating exacerbated by deep inspiration and ambulation.  Patient denies fever, chills, loss of appetite, nausea or vomiting, last bowel movement was this morning, denies dysuria, changes in odor or urine color, no recent illnesses.  Affirms feeling tired.       Patient has a surgical history of fibroids,  hysterectomy in , Abdominoplasty in 2018, , she is sexually active with , does not use protection.

## 2023-02-17 ENCOUNTER — PATIENT MESSAGE (OUTPATIENT)
Dept: OBSTETRICS AND GYNECOLOGY | Facility: CLINIC | Age: 43
End: 2023-02-17
Payer: COMMERCIAL

## 2023-02-17 NOTE — ED NOTES
I-STAT Chem-8+ Results:   Value Reference Range   Sodium 139 136-145 mmol/L   Potassium  3.4 3.5-5.1 mmol/L   Chloride 104  mmol/L   Ionized Calcium 1.16 1.06-1.42 mmol/L   CO2 (measured) 22 23-29 mmol/L   Glucose 78  mg/dL   BUN 11 6-30 mg/dL   Creatinine 0.6 0.5-1.4 mg/dL   Hematocrit 41 36-54%

## 2023-02-17 NOTE — ED NOTES
Patient identifiers verified and correct for Ms Kilpatrick  C/C: Abd pain SEE NN  APPEARANCE: awake and alert in NAD. PAIN  8/10  SKIN: warm, dry and intact. No breakdown or bruising.  MUSCULOSKELETAL: Patient moving all extremities spontaneously, no obvious swelling or deformities noted. Ambulates independently.  RESPIRATORY: Denies shortness of breath.Respirations unlabored.   CARDIAC: Denies CP, 2+ distal pulses; no peripheral edema  ABDOMEN: S/ND/NT, Denies nausea  : voids spontaneously, denies difficulty  Neurologic: AAO x 4; follows commands equal strength in all extremities; denies numbness/tingling. Denies dizziness  Josh weakness

## 2023-02-17 NOTE — DISCHARGE INSTRUCTIONS
The CT scan does not show signs of acute appendicitis.  It does show a corpus luteum cyst of your right ovary.  Take Tylenol and naproxen as needed for pain.  Follow up with your primary doctor and gynecology or return to the emergency department sooner for any new or worsening symptoms.

## 2023-02-18 ENCOUNTER — HOSPITAL ENCOUNTER (EMERGENCY)
Facility: HOSPITAL | Age: 43
Discharge: HOME | End: 2023-02-18
Attending: EMERGENCY MEDICINE
Payer: COMMERCIAL

## 2023-02-18 VITALS
HEART RATE: 65 BPM | RESPIRATION RATE: 18 BRPM | TEMPERATURE: 98 F | WEIGHT: 211 LBS | OXYGEN SATURATION: 100 % | DIASTOLIC BLOOD PRESSURE: 79 MMHG | BODY MASS INDEX: 31.16 KG/M2 | SYSTOLIC BLOOD PRESSURE: 134 MMHG

## 2023-02-18 DIAGNOSIS — R10.31 RIGHT LOWER QUADRANT ABDOMINAL PAIN: Primary | ICD-10-CM

## 2023-02-18 LAB
ALBUMIN SERPL BCP-MCNC: 4 G/DL (ref 3.5–5.2)
ALP SERPL-CCNC: 47 U/L (ref 55–135)
ALT SERPL W/O P-5'-P-CCNC: 16 U/L (ref 10–44)
ANION GAP SERPL CALC-SCNC: 9 MMOL/L (ref 8–16)
AST SERPL-CCNC: 21 U/L (ref 10–40)
BACTERIA GENITAL QL WET PREP: ABNORMAL
BASOPHILS # BLD AUTO: 0.06 K/UL (ref 0–0.2)
BASOPHILS NFR BLD: 0.8 % (ref 0–1.9)
BILIRUB SERPL-MCNC: 0.5 MG/DL (ref 0.1–1)
BILIRUB UR QL STRIP: NEGATIVE
BUN SERPL-MCNC: 7 MG/DL (ref 6–20)
CALCIUM SERPL-MCNC: 8.8 MG/DL (ref 8.7–10.5)
CHLORIDE SERPL-SCNC: 109 MMOL/L (ref 95–110)
CLARITY UR: CLEAR
CLUE CELLS VAG QL WET PREP: ABNORMAL
CO2 SERPL-SCNC: 22 MMOL/L (ref 23–29)
COLOR UR: YELLOW
CREAT SERPL-MCNC: 0.8 MG/DL (ref 0.5–1.4)
DIFFERENTIAL METHOD: ABNORMAL
EOSINOPHIL # BLD AUTO: 0.1 K/UL (ref 0–0.5)
EOSINOPHIL NFR BLD: 1.1 % (ref 0–8)
ERYTHROCYTE [DISTWIDTH] IN BLOOD BY AUTOMATED COUNT: 13.2 % (ref 11.5–14.5)
EST. GFR  (NO RACE VARIABLE): >60 ML/MIN/1.73 M^2
FILAMENT FUNGI VAG WET PREP-#/AREA: ABNORMAL
GLUCOSE SERPL-MCNC: 89 MG/DL (ref 70–110)
GLUCOSE UR QL STRIP: NEGATIVE
HCT VFR BLD AUTO: 39.1 % (ref 37–48.5)
HGB BLD-MCNC: 13 G/DL (ref 12–16)
HGB UR QL STRIP: NEGATIVE
IMM GRANULOCYTES # BLD AUTO: 0.02 K/UL (ref 0–0.04)
IMM GRANULOCYTES NFR BLD AUTO: 0.3 % (ref 0–0.5)
KETONES UR QL STRIP: NEGATIVE
LEUKOCYTE ESTERASE UR QL STRIP: NEGATIVE
LYMPHOCYTES # BLD AUTO: 1.7 K/UL (ref 1–4.8)
LYMPHOCYTES NFR BLD: 21 % (ref 18–48)
MCH RBC QN AUTO: 25.9 PG (ref 27–31)
MCHC RBC AUTO-ENTMCNC: 33.2 G/DL (ref 32–36)
MCV RBC AUTO: 78 FL (ref 82–98)
MONOCYTES # BLD AUTO: 0.5 K/UL (ref 0.3–1)
MONOCYTES NFR BLD: 6.1 % (ref 4–15)
NEUTROPHILS # BLD AUTO: 5.6 K/UL (ref 1.8–7.7)
NEUTROPHILS NFR BLD: 70.7 % (ref 38–73)
NITRITE UR QL STRIP: NEGATIVE
NRBC BLD-RTO: 0 /100 WBC
PH UR STRIP: 7 [PH] (ref 5–8)
PLATELET # BLD AUTO: 180 K/UL (ref 150–450)
PMV BLD AUTO: 10 FL (ref 9.2–12.9)
POTASSIUM SERPL-SCNC: 3.9 MMOL/L (ref 3.5–5.1)
PROT SERPL-MCNC: 7.7 G/DL (ref 6–8.4)
PROT UR QL STRIP: ABNORMAL
RBC # BLD AUTO: 5.02 M/UL (ref 4–5.4)
SODIUM SERPL-SCNC: 140 MMOL/L (ref 136–145)
SP GR UR STRIP: 1.01 (ref 1–1.03)
SPECIMEN SOURCE: ABNORMAL
T VAGINALIS GENITAL QL WET PREP: ABNORMAL
URN SPEC COLLECT METH UR: ABNORMAL
UROBILINOGEN UR STRIP-ACNC: NEGATIVE EU/DL
WBC # BLD AUTO: 7.85 K/UL (ref 3.9–12.7)
WBC #/AREA VAG WET PREP: ABNORMAL
YEAST GENITAL QL WET PREP: ABNORMAL

## 2023-02-18 PROCEDURE — 63600175 PHARM REV CODE 636 W HCPCS: Performed by: EMERGENCY MEDICINE

## 2023-02-18 PROCEDURE — 81003 URINALYSIS AUTO W/O SCOPE: CPT | Performed by: NURSE PRACTITIONER

## 2023-02-18 PROCEDURE — 85025 COMPLETE CBC W/AUTO DIFF WBC: CPT | Performed by: NURSE PRACTITIONER

## 2023-02-18 PROCEDURE — 96372 THER/PROPH/DIAG INJ SC/IM: CPT | Mod: 59 | Performed by: EMERGENCY MEDICINE

## 2023-02-18 PROCEDURE — 80053 COMPREHEN METABOLIC PANEL: CPT | Performed by: NURSE PRACTITIONER

## 2023-02-18 PROCEDURE — 99285 EMERGENCY DEPT VISIT HI MDM: CPT | Mod: 25

## 2023-02-18 PROCEDURE — 87210 SMEAR WET MOUNT SALINE/INK: CPT | Performed by: EMERGENCY MEDICINE

## 2023-02-18 PROCEDURE — 25000003 PHARM REV CODE 250: Performed by: EMERGENCY MEDICINE

## 2023-02-18 PROCEDURE — 96374 THER/PROPH/DIAG INJ IV PUSH: CPT

## 2023-02-18 PROCEDURE — 87591 N.GONORRHOEAE DNA AMP PROB: CPT | Performed by: EMERGENCY MEDICINE

## 2023-02-18 RX ORDER — ACETAMINOPHEN 500 MG
1000 TABLET ORAL
Status: COMPLETED | OUTPATIENT
Start: 2023-02-18 | End: 2023-02-18

## 2023-02-18 RX ORDER — MEDROXYPROGESTERONE ACETATE 150 MG/ML
150 INJECTION, SUSPENSION INTRAMUSCULAR ONCE
Status: COMPLETED | OUTPATIENT
Start: 2023-02-18 | End: 2023-02-18

## 2023-02-18 RX ORDER — KETOROLAC TROMETHAMINE 30 MG/ML
15 INJECTION, SOLUTION INTRAMUSCULAR; INTRAVENOUS
Status: COMPLETED | OUTPATIENT
Start: 2023-02-18 | End: 2023-02-18

## 2023-02-18 RX ORDER — ACETAMINOPHEN 500 MG
1000 TABLET ORAL EVERY 6 HOURS PRN
Qty: 112 TABLET | Refills: 0 | Status: SHIPPED | OUTPATIENT
Start: 2023-02-18 | End: 2023-03-04

## 2023-02-18 RX ORDER — KETOROLAC TROMETHAMINE 10 MG/1
10 TABLET, FILM COATED ORAL EVERY 6 HOURS
Qty: 56 TABLET | Refills: 0 | Status: SHIPPED | OUTPATIENT
Start: 2023-02-18 | End: 2023-03-04

## 2023-02-18 RX ADMIN — MEDROXYPROGESTERONE ACETATE 150 MG: 150 INJECTION, SUSPENSION, EXTENDED RELEASE INTRAMUSCULAR at 05:02

## 2023-02-18 RX ADMIN — KETOROLAC TROMETHAMINE 15 MG: 30 INJECTION, SOLUTION INTRAMUSCULAR; INTRAVENOUS at 05:02

## 2023-02-18 RX ADMIN — ACETAMINOPHEN 1000 MG: 500 TABLET ORAL at 05:02

## 2023-02-18 NOTE — ED PROVIDER NOTES
Encounter Date: 2023       History     Chief Complaint   Patient presents with    Abdominal Pain     Pt went to [facility] last week with right sided abdominal pain and was diagnosed with a cyst. Pt reports the pain has increased and is worried that it ruptured. Pt has nausea but no vomiting. No vaginal bleeding.      42-year-old female with surgical history of fibroids,  hysterectomy in  for fibroids, Abdominoplasty in 2018,  presents with complaint of abdominal pain.  Patient says that 2 days ago she saw her primary care doctor because she had had onset of abdominal pain a few days prior to that.  Her primary care doctor sent to the emergency department where she had a CT scan that showed a right-sided ovarian cyst.  She has been taking naproxen and ibuprofen for the pain, most recently took 800 mg ibuprofen at 6 this morning.  Came to the emergency department because at 3:00 a.m. she woke up from sleep with right-sided lower abdominal pain and was concerned that she might have had a ruptured cyst.  Says she also took her 's oxycodone.  Says that despite these medications she had no relief from her pain.  Denies fever, chills.  Endorses nausea but no vomiting.  No vaginal discharge, dysuria or hematuria.  Notes that she is sexually active with , does not use protection.    The history is provided by the patient. No  was used.   Review of patient's allergies indicates:  No Known Allergies  Past Medical History:   Diagnosis Date    Migraine headache     with aura    Mitral valve prolapse     PONV (postoperative nausea and vomiting)      Past Surgical History:   Procedure Laterality Date    ARTHROSCOPIC CHONDROPLASTY OF KNEE JOINT Left 2018    Procedure: ARTHROSCOPY, KNEE, WITH CHONDROPLASTY;  Surgeon: Konrad Mckeon MD;  Location: [facility];  Service: Orthopedics;  Laterality: Left;    ARTHROSCOPY OF KNEE Left 2018    Procedure: ARTHROSCOPY, KNEE;   Surgeon: Konrad Mckeon MD;  Location: [facility] OR;  Service: Orthopedics;  Laterality: Left;    Breast Reduction      COLONOSCOPY N/A 6/1/2022    Procedure: COLONOSCOPY;  Surgeon: Didier Contreras MD;  Location: [facility] ENDO (4TH FLR);  Service: Endoscopy;  Laterality: N/A;  fully vaccinated-GT    CYSTOSCOPY N/A 6/23/2021    Procedure: CYSTOSCOPY;  Surgeon: Puja Hearn MD;  Location: [facility] OR;  Service: OB/GYN;  Laterality: N/A;    HYSTERECTOMY      6/2020    LATERAL RETINACULA RELEASE OF KNEE Left 8/24/2018    Procedure: RELEASE, KNEE, LATERAL RETINACULA;  Surgeon: Konrad Mckeon MD;  Location: [facility] OR;  Service: Orthopedics;  Laterality: Left;    ROBOT-ASSISTED LAPAROSCOPIC HYSTERECTOMY N/A 6/23/2021    Procedure: ROBOTIC HYSTERECTOMY;  Surgeon: Puja Hearn MD;  Location: [facility] OR;  Service: OB/GYN;  Laterality: N/A;    SALPINGECTOMY Bilateral 6/23/2021    Procedure: SALPINGECTOMY;  Surgeon: Puja Hearn MD;  Location: [facility] OR;  Service: OB/GYN;  Laterality: Bilateral;    TOTAL REDUCTION MAMMOPLASTY      2010    TUBAL LIGATION      [facility]       Family History   Problem Relation Age of Onset    Breast cancer Mother 68    Hypertension Mother     Hypertension Brother     Breast cancer Maternal Aunt     Stomach cancer Maternal Aunt     Breast cancer Maternal Aunt     Breast cancer Maternal Aunt     Lupus Maternal Grandmother     Cancer Paternal Grandmother      Social History     Tobacco Use    Smoking status: Never    Smokeless tobacco: Never   Substance Use Topics    Alcohol use: Yes     Comment: occasional    Drug use: Not Currently     Types: Marijuana     Comment: During weekends 1-2/week. Quit 3 years ago.     Review of Systems   Gastrointestinal:  Positive for abdominal pain.     Physical Exam     Initial Vitals [02/18/23 1235]   BP Pulse Resp Temp SpO2   (!) 166/85 65 18 98.2 °F (36.8 °C) 100 %      MAP       --         Physical Exam    Nursing note and vitals reviewed.  Constitutional: She appears  well-developed. She is not diaphoretic. No distress.   HENT:   Head: Normocephalic and atraumatic.   Eyes: EOM are normal. Pupils are equal, round, and reactive to light.   Neck:   Normal range of motion.  Cardiovascular:  Normal rate.           Pulmonary/Chest: No respiratory distress.   Abdominal: Abdomen is soft. She exhibits no distension. There is abdominal tenderness (moderate RLQ. No overlying rash).   Musculoskeletal:         General: Normal range of motion.      Cervical back: Normal range of motion.     Neurological: She is alert and oriented to person, place, and time.   Skin: Skin is warm and dry.   Psychiatric: She has a normal mood and affect.       ED Course   Procedures  Labs Reviewed   CBC W/ AUTO DIFFERENTIAL - Abnormal; Notable for the following components:       Result Value    MCV 78 (*)     MCH 25.9 (*)     All other components within normal limits   COMPREHENSIVE METABOLIC PANEL - Abnormal; Notable for the following components:    CO2 22 (*)     Alkaline Phosphatase 47 (*)     All other components within normal limits   URINALYSIS, REFLEX TO URINE CULTURE - Abnormal; Notable for the following components:    Protein, UA Trace (*)     All other components within normal limits    Narrative:     Specimen Source->Urine   VAGINAL SCREEN   C. TRACHOMATIS/N. GONORRHOEAE BY AMP DNA          Imaging Results              US Pelvis Comp with Transvag NON-OB (xpd) (Final result)  Result time 02/18/23 16:23:05      Final result by Kylie Swift MD (02/18/23 16:23:05)                   Impression:      As above.      Electronically signed by: Kylie Swift MD  Date:    02/18/2023  Time:    16:23               Narrative:    EXAMINATION:  US PELVIS COMP WITH TRANSVAG NON-OB (XPD)    CLINICAL HISTORY:  right pelvic pain.;    TECHNIQUE:  Transabdominal sonography of the pelvis was performed, followed by transvaginal sonography to better evaluate the uterus and  ovaries.    COMPARISON:  2023    FINDINGS:  The uterus has been surgically removed.  The left ovary could not be visualized.  The right ovary measures 4.6 x 3.4 x 2.9 cm.  Suspecting collapsing follicle versus collapsing corpus luteal cyst of the right ovary measuring 2.1 x 1.5 x 2.3 cm, corresponding to the abnormality on recent CT scan.  There is a small amount of free fluid in the pelvis.                                       Medications   acetaminophen tablet 1,000 mg (has no administration in time range)   ketorolac injection 15 mg (has no administration in time range)   medroxyPROGESTERone (DEPO-PROVERA) injection 150 mg (has no administration in time range)     Medical Decision Making:   History:   Old Records Summarized: other records.       <> Summary of Records: ED visit from 2 days ago, CT imaging finding below.  Differential Diagnosis:   UTI, PID, ovarian torsion, kidney stone, musculoskeletal pain  Clinical Tests:   Lab Tests: Ordered and Reviewed  Radiological Study: Ordered and Reviewed  ED Management:  42-year-old female with surgical history of fibroids,  hysterectomy in  for fibroids, Abdominoplasty in 2018,  presents with a complaint of abdominal pain, onset about 12 hours prior to my assessment.  Upon arrival she is afebrile with stable vital signs.  Labs show stable hemoglobin.  Normal renal function.  Normal electrolytes.  Urine without evidence of infection.  No blood to indicate kidney stone.  Ultrasound without acute findings aside from known right-sided cyst.  Ordered STD screening which is pending at time of discharge, realize this is a low likelihood but discussed with patient that we will check to ensure this is not a contributing factor.  Discussed pt with OB, conversation as charted below.  Given symptomatic therapy in the emergency department and discharged with strict return precautions and outpatient follow up.  Other:   I have discussed this case with another health  care provider.           ED Course as of 02/18/23 1722   Sat Feb 18, 2023   1626 US Pelvis read: The uterus has been surgically removed.  The left ovary could not be visualized.  The right ovary measures 4.6 x 3.4 x 2.9 cm.  Suspecting collapsing follicle versus collapsing corpus luteal cyst of the right ovary measuring 2.1 x 1.5 x 2.3 cm, corresponding to the abnormality on recent CT scan.  There is a small amount of free fluid in the pelvis. [AT]   1627 2/15/23 CT A/P read:   Impression:     Probable corpus luteum in the right ovary.     No evidence of appendicitis or other acute finding within the abdomen or pelvis. [AT]   1646 Discussed pt with Dr. Mistry, OB/GYN, given my concern about pt's persistent RLQ abdominal pain refractory to NSAID's. She reviewed pt's images. Given absence of acute findings on imaging or labs, she advised continued symptomatic therapy. Since pt had already trialed NSAID's at home and her 's oxy w/o relief, she said we could trial a shot of Depo-Provera, but that pt is appropriate for discharge.  [AT]      ED Course User Index  [AT] Belkis Mckenna MD                 Clinical Impression:   Final diagnoses:  [R10.31] Right lower quadrant abdominal pain (Primary)        ED Disposition Condition    Discharge Stable          ED Prescriptions       Medication Sig Dispense Start Date End Date Auth. Provider    acetaminophen (TYLENOL) 500 MG tablet Take 2 tablets (1,000 mg total) by mouth every 6 (six) hours as needed for Pain. 112 tablet 2/18/2023 3/4/2023 Belkis Mckenna MD    ketorolac (TORADOL) 10 mg tablet Take 1 tablet (10 mg total) by mouth every 6 (six) hours. for 14 days 56 tablet 2/18/2023 3/4/2023 Belkis Mckenna MD          Follow-up Information       Follow up With Specialties Details Why Contact Info    [facility] - Emergency Dept Emergency Medicine  As needed, If symptoms worsen [address] [facility] [address] [phone]    [facility]  OB/GYN Obstetrics and Gynecology Schedule an appointment as soon as possible for a visit   [address] [place] [id number]  [phone]             Belkis Mckenna MD  02/18/23 5638

## 2023-02-18 NOTE — DISCHARGE INSTRUCTIONS

## 2023-02-18 NOTE — FIRST PROVIDER EVALUATION
Emergency Department TeleTriage Encounter Note      CHIEF COMPLAINT    Chief Complaint   Patient presents with    Abdominal Pain     Pt went to [facility] last week with right sided abdominal pain and was diagnosed with a cyst. Pt reports the pain has increased and is worried that it ruptured. Pt has nausea but no vomiting. No vaginal bleeding.        VITAL SIGNS   Initial Vitals [02/18/23 1235]   BP Pulse Resp Temp SpO2   (!) 166/85 65 18 98.2 °F (36.8 °C) 100 %      MAP       --            ALLERGIES    Review of patient's allergies indicates:  No Known Allergies    PROVIDER TRIAGE NOTE  This is a teletriage evaluation of a 42 y.o. female presenting to the ED complaining of pelvic pain. Pt was diagnosed with an ovarian cyst last week at [facility]. States pain worsened at 0300 today. Denies fever and vaginal bleeding.     Will obtain US to evaluate for torsion. Pt is well-appearing.     Initial orders will be placed and care will be transferred to an alternate provider when patient is roomed for a full evaluation. Any additional orders and the final disposition will be determined by that provider.         ORDERS  Labs Reviewed - No data to display    ED Orders (720h ago, onward)      Start Ordered     Status Ordering Provider    02/18/23 1241 02/18/23 1241  CBC auto differential  STAT         Ordered JILLIAN GIBBS    02/18/23 1241 02/18/23 1241  Comprehensive metabolic panel  STAT         Ordered JILLIAN GIBBS.    02/18/23 1241 02/18/23 1241  Insert Saline lock IV  Once         Ordered JILLIAN GIBBS    02/18/23 1241 02/18/23 1241  Urinalysis, Reflex to Urine Culture Urine, Clean Catch  STAT         Ordered JILLIAN GIBBS.    02/18/23 1241 02/18/23 1241  US Pelvis Comp with Transvag NON-OB (xpd)  1 time imaging        Comments: Please evaluate for torsion.    Ordered JILLIAN GIBBS              Virtual Visit Note: The provider triage portion of this emergency  department evaluation and documentation was performed via [employer], a HIPAA-compliant telemedicine application, in concert with a tele-presenter in the room. A face to face patient evaluation with one of my colleagues will occur once the patient is placed in an emergency department room.      DISCLAIMER: This note was prepared with [employer] voice recognition transcription software. Garbled syntax, mangled pronouns, and other bizarre constructions may be attributed to that software system.

## 2023-02-19 LAB
C TRACH DNA SPEC QL NAA+PROBE: NOT DETECTED
N GONORRHOEA DNA SPEC QL NAA+PROBE: NOT DETECTED

## 2023-02-20 ENCOUNTER — OFFICE VISIT (OUTPATIENT)
Dept: SURGERY | Facility: CLINIC | Age: 43
End: 2023-02-20
Payer: COMMERCIAL

## 2023-02-20 ENCOUNTER — PATIENT MESSAGE (OUTPATIENT)
Dept: OBSTETRICS AND GYNECOLOGY | Facility: CLINIC | Age: 43
End: 2023-02-20
Payer: COMMERCIAL

## 2023-02-20 VITALS
BODY MASS INDEX: 30.74 KG/M2 | WEIGHT: 207.56 LBS | RESPIRATION RATE: 19 BRPM | HEIGHT: 69 IN | SYSTOLIC BLOOD PRESSURE: 146 MMHG | HEART RATE: 61 BPM | DIASTOLIC BLOOD PRESSURE: 92 MMHG | OXYGEN SATURATION: 100 %

## 2023-02-20 DIAGNOSIS — K64.9 HEMORRHOIDS, UNSPECIFIED HEMORRHOID TYPE: Primary | ICD-10-CM

## 2023-02-20 PROCEDURE — 99203 OFFICE O/P NEW LOW 30 MIN: CPT | Mod: S$GLB,,, | Performed by: SURGERY

## 2023-02-20 PROCEDURE — 99999 PR PBB SHADOW E&M-EST. PATIENT-LVL IV: ICD-10-PCS | Mod: PBBFAC,,, | Performed by: SURGERY

## 2023-02-20 PROCEDURE — 3077F SYST BP >= 140 MM HG: CPT | Mod: CPTII,S$GLB,, | Performed by: SURGERY

## 2023-02-20 PROCEDURE — 3080F DIAST BP >= 90 MM HG: CPT | Mod: CPTII,S$GLB,, | Performed by: SURGERY

## 2023-02-20 PROCEDURE — 1159F MED LIST DOCD IN RCRD: CPT | Mod: CPTII,S$GLB,, | Performed by: SURGERY

## 2023-02-20 PROCEDURE — 3077F PR MOST RECENT SYSTOLIC BLOOD PRESSURE >= 140 MM HG: ICD-10-PCS | Mod: CPTII,S$GLB,, | Performed by: SURGERY

## 2023-02-20 PROCEDURE — 3080F PR MOST RECENT DIASTOLIC BLOOD PRESSURE >= 90 MM HG: ICD-10-PCS | Mod: CPTII,S$GLB,, | Performed by: SURGERY

## 2023-02-20 PROCEDURE — 99999 PR PBB SHADOW E&M-EST. PATIENT-LVL IV: CPT | Mod: PBBFAC,,, | Performed by: SURGERY

## 2023-02-20 PROCEDURE — 3008F PR BODY MASS INDEX (BMI) DOCUMENTED: ICD-10-PCS | Mod: CPTII,S$GLB,, | Performed by: SURGERY

## 2023-02-20 PROCEDURE — 99203 PR OFFICE/OUTPT VISIT, NEW, LEVL III, 30-44 MIN: ICD-10-PCS | Mod: S$GLB,,, | Performed by: SURGERY

## 2023-02-20 PROCEDURE — 1159F PR MEDICATION LIST DOCUMENTED IN MEDICAL RECORD: ICD-10-PCS | Mod: CPTII,S$GLB,, | Performed by: SURGERY

## 2023-02-20 PROCEDURE — 3008F BODY MASS INDEX DOCD: CPT | Mod: CPTII,S$GLB,, | Performed by: SURGERY

## 2023-02-20 NOTE — PROGRESS NOTES
CRS Office Visit History and Physical    Referring Md:   Aaareferral Self  No address on file    SUBJECTIVE:     Chief Complaint: hemorrhoids    History of Present Illness:  The patient is a new patient to this practice.   Course is as follows:  Desire Kilpatrick is a 42 y.o. female presents with constipation and hemorrhoids. She reports changes in her bowel habits after her hysterectomy in 2021.  She recently started metamucil and has seen some improvement, but still not as regular as before.  She reports hemorrhoids for some time, but feels that they are prolapsing more and larger.  She reports irritation and difficulty with hygiene.      Last Colonoscopy: 6/2022    Review of patient's allergies indicates:  No Known Allergies    Past Medical History:   Diagnosis Date    Migraine headache     with aura    Mitral valve prolapse 2012    PONV (postoperative nausea and vomiting)      Past Surgical History:   Procedure Laterality Date    ARTHROSCOPIC CHONDROPLASTY OF KNEE JOINT Left 8/24/2018    Procedure: ARTHROSCOPY, KNEE, WITH CHONDROPLASTY;  Surgeon: Konrad Mckeon MD;  Location: [facility];  Service: Orthopedics;  Laterality: Left;    ARTHROSCOPY OF KNEE Left 8/24/2018    Procedure: ARTHROSCOPY, KNEE;  Surgeon: Konrad Mckeon MD;  Location: [facility] OR;  Service: Orthopedics;  Laterality: Left;    Breast Reduction      COLONOSCOPY N/A 6/1/2022    Procedure: COLONOSCOPY;  Surgeon: Didier Contreras MD;  Location: [address]);  Service: Endoscopy;  Laterality: N/A;  fully vaccinated-GT    CYSTOSCOPY N/A 6/23/2021    Procedure: CYSTOSCOPY;  Surgeon: Puja Hearn MD;  Location: [facility] OR;  Service: OB/GYN;  Laterality: N/A;    HYSTERECTOMY      6/2020    LATERAL RETINACULA RELEASE OF KNEE Left 8/24/2018    Procedure: RELEASE, KNEE, LATERAL RETINACULA;  Surgeon: Konrad Mckeon MD;  Location: [facility];  Service: Orthopedics;  Laterality: Left;    ROBOT-ASSISTED LAPAROSCOPIC HYSTERECTOMY N/A 6/23/2021     "Procedure: ROBOTIC HYSTERECTOMY;  Surgeon: Puja Hearn MD;  Location: [facility] OR;  Service: OB/GYN;  Laterality: N/A;    SALPINGECTOMY Bilateral 6/23/2021    Procedure: SALPINGECTOMY;  Surgeon: Puja Hearn MD;  Location: [facility] OR;  Service: OB/GYN;  Laterality: Bilateral;    TOTAL REDUCTION MAMMOPLASTY      2010    TUBAL LIGATION      [facility]       Family History   Problem Relation Age of Onset    Breast cancer Mother 68    Hypertension Mother     Hypertension Brother     Breast cancer Maternal Aunt     Stomach cancer Maternal Aunt     Breast cancer Maternal Aunt     Breast cancer Maternal Aunt     Lupus Maternal Grandmother     Cancer Paternal Grandmother      Social History     Tobacco Use    Smoking status: Never    Smokeless tobacco: Never   Substance Use Topics    Alcohol use: Yes     Comment: occasional    Drug use: Not Currently     Types: Marijuana     Comment: During weekends 1-2/week. Quit 3 years ago.        Review of Systems:  Review of Systems   All other systems reviewed and are negative.    OBJECTIVE:     Vital Signs (Most Recent)  BP (!) 146/92 (BP Location: Right arm, Patient Position: Sitting)   Pulse 61   Resp 19   Ht 5' 9" (1.753 m)   Wt 94.2 kg (207 lb 9 oz)   LMP 06/07/2021 (Approximate)   SpO2 100%   BMI 30.65 kg/m²     Physical Exam:  General: 42 y.o. female in no distress   Neuro: alert and oriented x 4.  Moves all extremities.     HEENT: normocephalic, atraumatic, PERRL, EOMI   Respiratory: respirations are even and unlabored  Cardiac: regular rate and rhythm  Abdomen: soft, NTND  Extremities: Warm dry and intact  Skin: no rashes  Anorectal: internal/external hemorrhoids in the anterior and posterior midline, anterior hemorrhoid with focal area of thrombosis     Labs: NA    Imaging: NA      ASSESSMENT/PLAN:     Diagnoses and all orders for this visit:    Hemorrhoids, unspecified hemorrhoid type  -     Case Request Operating Room: HEMORRHOIDECTOMY        42 y.o. female with " internal/external hemorrhoids    - We discussed treatment options.  Ms. Kilpatrick is already taking a fiber supplement with adequate water intake.  Can add miralax PRN for constipation   - She would like to proceed with excisional hemorrhoidectomy.  We discussed post-operative pain expectations as well as cosmetic outcomes.  All other risks, benefits and alternatives of surgery we discussed and consent was signed.   - Will plan for surgery on 5/24/23    Elva Velazquez MD  Staff Surgeon  Colon & Rectal Surgery

## 2023-03-14 ENCOUNTER — HOSPITAL ENCOUNTER (OUTPATIENT)
Dept: RADIOLOGY | Facility: OTHER | Age: 43
Discharge: HOME | End: 2023-03-14
Attending: OBSTETRICS & GYNECOLOGY
Payer: COMMERCIAL

## 2023-03-14 DIAGNOSIS — Z12.31 ENCOUNTER FOR SCREENING MAMMOGRAM FOR BREAST CANCER: ICD-10-CM

## 2023-03-14 PROCEDURE — 77063 BREAST TOMOSYNTHESIS BI: CPT | Mod: 26,,, | Performed by: RADIOLOGY

## 2023-03-14 PROCEDURE — 77063 MAMMO DIGITAL SCREENING BILAT WITH TOMO: ICD-10-PCS | Mod: 26,,, | Performed by: RADIOLOGY

## 2023-03-14 PROCEDURE — 77067 SCR MAMMO BI INCL CAD: CPT | Mod: TC

## 2023-03-14 PROCEDURE — 77067 SCR MAMMO BI INCL CAD: CPT | Mod: 26,,, | Performed by: RADIOLOGY

## 2023-03-14 PROCEDURE — 77067 MAMMO DIGITAL SCREENING BILAT WITH TOMO: ICD-10-PCS | Mod: 26,,, | Performed by: RADIOLOGY

## 2023-03-15 ENCOUNTER — OFFICE VISIT (OUTPATIENT)
Dept: PSYCHIATRY | Facility: CLINIC | Age: 43
End: 2023-03-15
Payer: COMMERCIAL

## 2023-03-15 ENCOUNTER — LAB VISIT (OUTPATIENT)
Dept: LAB | Facility: HOSPITAL | Age: 43
End: 2023-03-15
Payer: COMMERCIAL

## 2023-03-15 VITALS
WEIGHT: 212.5 LBS | SYSTOLIC BLOOD PRESSURE: 175 MMHG | HEART RATE: 79 BPM | BODY MASS INDEX: 31.38 KG/M2 | DIASTOLIC BLOOD PRESSURE: 111 MMHG

## 2023-03-15 DIAGNOSIS — F41.9 ANXIETY: ICD-10-CM

## 2023-03-15 DIAGNOSIS — F32.A DEPRESSION, UNSPECIFIED DEPRESSION TYPE: Primary | ICD-10-CM

## 2023-03-15 LAB
ALBUMIN SERPL BCP-MCNC: 4 G/DL (ref 3.5–5.2)
ALP SERPL-CCNC: 55 U/L (ref 55–135)
ALT SERPL W/O P-5'-P-CCNC: 21 U/L (ref 10–44)
ANION GAP SERPL CALC-SCNC: 11 MMOL/L (ref 8–16)
AST SERPL-CCNC: 20 U/L (ref 10–40)
BASOPHILS # BLD AUTO: 0.05 K/UL (ref 0–0.2)
BASOPHILS NFR BLD: 0.7 % (ref 0–1.9)
BILIRUB SERPL-MCNC: 0.4 MG/DL (ref 0.1–1)
BUN SERPL-MCNC: 9 MG/DL (ref 6–20)
CALCIUM SERPL-MCNC: 9.4 MG/DL (ref 8.7–10.5)
CHLORIDE SERPL-SCNC: 104 MMOL/L (ref 95–110)
CO2 SERPL-SCNC: 23 MMOL/L (ref 23–29)
CREAT SERPL-MCNC: 0.8 MG/DL (ref 0.5–1.4)
DIFFERENTIAL METHOD: ABNORMAL
EOSINOPHIL # BLD AUTO: 0.1 K/UL (ref 0–0.5)
EOSINOPHIL NFR BLD: 1.7 % (ref 0–8)
ERYTHROCYTE [DISTWIDTH] IN BLOOD BY AUTOMATED COUNT: 12.9 % (ref 11.5–14.5)
EST. GFR  (NO RACE VARIABLE): >60 ML/MIN/1.73 M^2
FOLATE SERPL-MCNC: 12.6 NG/ML (ref 4–24)
GLUCOSE SERPL-MCNC: 86 MG/DL (ref 70–110)
HCT VFR BLD AUTO: 42 % (ref 37–48.5)
HGB BLD-MCNC: 13.5 G/DL (ref 12–16)
IMM GRANULOCYTES # BLD AUTO: 0.02 K/UL (ref 0–0.04)
IMM GRANULOCYTES NFR BLD AUTO: 0.3 % (ref 0–0.5)
LYMPHOCYTES # BLD AUTO: 2.4 K/UL (ref 1–4.8)
LYMPHOCYTES NFR BLD: 33.9 % (ref 18–48)
MCH RBC QN AUTO: 25.2 PG (ref 27–31)
MCHC RBC AUTO-ENTMCNC: 32.1 G/DL (ref 32–36)
MCV RBC AUTO: 79 FL (ref 82–98)
MONOCYTES # BLD AUTO: 0.4 K/UL (ref 0.3–1)
MONOCYTES NFR BLD: 6.2 % (ref 4–15)
NEUTROPHILS # BLD AUTO: 4 K/UL (ref 1.8–7.7)
NEUTROPHILS NFR BLD: 57.2 % (ref 38–73)
NRBC BLD-RTO: 0 /100 WBC
PLATELET # BLD AUTO: 204 K/UL (ref 150–450)
PMV BLD AUTO: 9.8 FL (ref 9.2–12.9)
POTASSIUM SERPL-SCNC: 3.8 MMOL/L (ref 3.5–5.1)
PROT SERPL-MCNC: 7.9 G/DL (ref 6–8.4)
RBC # BLD AUTO: 5.35 M/UL (ref 4–5.4)
SODIUM SERPL-SCNC: 138 MMOL/L (ref 136–145)
T3 SERPL-MCNC: 76 NG/DL (ref 60–180)
T4 FREE SERPL-MCNC: 0.89 NG/DL (ref 0.71–1.51)
TSH SERPL DL<=0.005 MIU/L-ACNC: 0.59 UIU/ML (ref 0.4–4)
VIT B12 SERPL-MCNC: 458 PG/ML (ref 210–950)
WBC # BLD AUTO: 6.97 K/UL (ref 3.9–12.7)

## 2023-03-15 PROCEDURE — 3077F PR MOST RECENT SYSTOLIC BLOOD PRESSURE >= 140 MM HG: ICD-10-PCS | Mod: CPTII,S$GLB,, | Performed by: STUDENT IN AN ORGANIZED HEALTH CARE EDUCATION/TRAINING PROGRAM

## 2023-03-15 PROCEDURE — 36415 COLL VENOUS BLD VENIPUNCTURE: CPT | Performed by: STUDENT IN AN ORGANIZED HEALTH CARE EDUCATION/TRAINING PROGRAM

## 2023-03-15 PROCEDURE — 99205 OFFICE O/P NEW HI 60 MIN: CPT | Mod: S$GLB,,, | Performed by: STUDENT IN AN ORGANIZED HEALTH CARE EDUCATION/TRAINING PROGRAM

## 2023-03-15 PROCEDURE — 1159F MED LIST DOCD IN RCRD: CPT | Mod: CPTII,S$GLB,, | Performed by: STUDENT IN AN ORGANIZED HEALTH CARE EDUCATION/TRAINING PROGRAM

## 2023-03-15 PROCEDURE — 1159F PR MEDICATION LIST DOCUMENTED IN MEDICAL RECORD: ICD-10-PCS | Mod: CPTII,S$GLB,, | Performed by: STUDENT IN AN ORGANIZED HEALTH CARE EDUCATION/TRAINING PROGRAM

## 2023-03-15 PROCEDURE — 3008F BODY MASS INDEX DOCD: CPT | Mod: CPTII,S$GLB,, | Performed by: STUDENT IN AN ORGANIZED HEALTH CARE EDUCATION/TRAINING PROGRAM

## 2023-03-15 PROCEDURE — 99999 PR PBB SHADOW E&M-EST. PATIENT-LVL III: CPT | Mod: PBBFAC,,, | Performed by: STUDENT IN AN ORGANIZED HEALTH CARE EDUCATION/TRAINING PROGRAM

## 2023-03-15 PROCEDURE — 3077F SYST BP >= 140 MM HG: CPT | Mod: CPTII,S$GLB,, | Performed by: STUDENT IN AN ORGANIZED HEALTH CARE EDUCATION/TRAINING PROGRAM

## 2023-03-15 PROCEDURE — 1160F PR REVIEW ALL MEDS BY PRESCRIBER/CLIN PHARMACIST DOCUMENTED: ICD-10-PCS | Mod: CPTII,S$GLB,, | Performed by: STUDENT IN AN ORGANIZED HEALTH CARE EDUCATION/TRAINING PROGRAM

## 2023-03-15 PROCEDURE — 99205 PR OFFICE/OUTPT VISIT, NEW, LEVL V, 60-74 MIN: ICD-10-PCS | Mod: S$GLB,,, | Performed by: STUDENT IN AN ORGANIZED HEALTH CARE EDUCATION/TRAINING PROGRAM

## 2023-03-15 PROCEDURE — 3080F PR MOST RECENT DIASTOLIC BLOOD PRESSURE >= 90 MM HG: ICD-10-PCS | Mod: CPTII,S$GLB,, | Performed by: STUDENT IN AN ORGANIZED HEALTH CARE EDUCATION/TRAINING PROGRAM

## 2023-03-15 PROCEDURE — 82607 VITAMIN B-12: CPT | Performed by: STUDENT IN AN ORGANIZED HEALTH CARE EDUCATION/TRAINING PROGRAM

## 2023-03-15 PROCEDURE — 85025 COMPLETE CBC W/AUTO DIFF WBC: CPT | Performed by: STUDENT IN AN ORGANIZED HEALTH CARE EDUCATION/TRAINING PROGRAM

## 2023-03-15 PROCEDURE — 80053 COMPREHEN METABOLIC PANEL: CPT | Performed by: STUDENT IN AN ORGANIZED HEALTH CARE EDUCATION/TRAINING PROGRAM

## 2023-03-15 PROCEDURE — 3008F PR BODY MASS INDEX (BMI) DOCUMENTED: ICD-10-PCS | Mod: CPTII,S$GLB,, | Performed by: STUDENT IN AN ORGANIZED HEALTH CARE EDUCATION/TRAINING PROGRAM

## 2023-03-15 PROCEDURE — 1160F RVW MEDS BY RX/DR IN RCRD: CPT | Mod: CPTII,S$GLB,, | Performed by: STUDENT IN AN ORGANIZED HEALTH CARE EDUCATION/TRAINING PROGRAM

## 2023-03-15 PROCEDURE — 84480 ASSAY TRIIODOTHYRONINE (T3): CPT | Performed by: STUDENT IN AN ORGANIZED HEALTH CARE EDUCATION/TRAINING PROGRAM

## 2023-03-15 PROCEDURE — 84439 ASSAY OF FREE THYROXINE: CPT | Performed by: STUDENT IN AN ORGANIZED HEALTH CARE EDUCATION/TRAINING PROGRAM

## 2023-03-15 PROCEDURE — 3080F DIAST BP >= 90 MM HG: CPT | Mod: CPTII,S$GLB,, | Performed by: STUDENT IN AN ORGANIZED HEALTH CARE EDUCATION/TRAINING PROGRAM

## 2023-03-15 PROCEDURE — 84443 ASSAY THYROID STIM HORMONE: CPT | Performed by: STUDENT IN AN ORGANIZED HEALTH CARE EDUCATION/TRAINING PROGRAM

## 2023-03-15 PROCEDURE — 82746 ASSAY OF FOLIC ACID SERUM: CPT | Performed by: STUDENT IN AN ORGANIZED HEALTH CARE EDUCATION/TRAINING PROGRAM

## 2023-03-15 PROCEDURE — 99999 PR PBB SHADOW E&M-EST. PATIENT-LVL III: ICD-10-PCS | Mod: PBBFAC,,, | Performed by: STUDENT IN AN ORGANIZED HEALTH CARE EDUCATION/TRAINING PROGRAM

## 2023-03-15 RX ORDER — BUPROPION HYDROCHLORIDE 150 MG/1
150 TABLET ORAL DAILY
Qty: 30 TABLET | Refills: 1 | Status: SHIPPED | OUTPATIENT
Start: 2023-03-15 | End: 2023-05-09

## 2023-03-15 NOTE — PROGRESS NOTES
Outpatient Psychiatry Initial Visit    3/15/2023    Desire Kilpatrick, a 42 y.o. female, presenting for initial evaluation visit. Met with patient.    Reason for Encounter: self-referral. Patient complains of depression.    Pt reports depression and anxiety  which has been been exacerbated x 4 months   -For mood: Cites sadness, isolation, and tearfulness   -for anxiety: Cites episodes of palpitations; triggers: schedules/timing/traffic    Also cites some suicidal thoughts (passive) which is causing patient concern   Also has noted increased alcohol intake (2/night on weekdays and 3-4 drinks on weekend)    Sleep: Fluctuates based on day   Appetite:     No HI, AVH   Denies any manic behavior or DIGFAST symptoms     Stressors: Job,   Lives at home with kids and    Of note, pt Tried Celexa last fall 2022 via online evaluation program but did not find improvement (took for some weeks)    History of Present Illness:     Psychiatric Review of Systems  Any changes in the following:   sleep:   fluctuates, see HPI    appetite: no   weight: no   energy/anergy: yes  interest/pleasure/anhedonia: no  somatic symptoms: yes  guilty/hopelessness: yes  concentration: no  S.I.B.s/risky behavior: no  SI/SA:   see HPI     anxiety/panic: yes  Agoraphobia:  no  Social phobia:  no  Recurrent nightmares:  no  hyper startle response:  no  Avoidance: no  Recurrent thoughts:  no  Recurrent behaviors:  no     Irritability: no  Racing thoughts: yes  Impulsive behaviors: no  Pressured speech:  no     Paranoia:no  Delusions: no  AVH:no    History:     Allergies:  Patient has no known allergies.    Past Medical/Surgical History:  Dx: HTN   Hospitalizations: Denies  Vitamins/Supplements: Probiotic, Vitamin D, MV  Surgeries: Tubal ligation, hysterectomy, abdominoplasty, knee surgery   Seizures: Denies  Migraines: Yes 16-26 yrs old, none recently   Allergies: No  Family: DM-Maternal, HTN-Maternal,     Past Medical History:   Diagnosis Date     Migraine headache     with aura    Mitral valve prolapse 2012    PONV (postoperative nausea and vomiting)      Past Surgical History:   Procedure Laterality Date    ARTHROSCOPIC CHONDROPLASTY OF KNEE JOINT Left 8/24/2018    Procedure: ARTHROSCOPY, KNEE, WITH CHONDROPLASTY;  Surgeon: Konrad Mckeon MD;  Location: [facility];  Service: Orthopedics;  Laterality: Left;    ARTHROSCOPY OF KNEE Left 8/24/2018    Procedure: ARTHROSCOPY, KNEE;  Surgeon: Konrad Mckeon MD;  Location: [facility];  Service: Orthopedics;  Laterality: Left;    Breast Reduction      COLONOSCOPY N/A 6/1/2022    Procedure: COLONOSCOPY;  Surgeon: Didier Contreras MD;  Location: [facility] ENDO (4TH FLR);  Service: Endoscopy;  Laterality: N/A;  fully vaccinated-GT    CYSTOSCOPY N/A 6/23/2021    Procedure: CYSTOSCOPY;  Surgeon: uPja Hearn MD;  Location: [facility];  Service: OB/GYN;  Laterality: N/A;    HYSTERECTOMY      6/2020    LATERAL RETINACULA RELEASE OF KNEE Left 8/24/2018    Procedure: RELEASE, KNEE, LATERAL RETINACULA;  Surgeon: Konrad Mckeon MD;  Location: [facility];  Service: Orthopedics;  Laterality: Left;    ROBOT-ASSISTED LAPAROSCOPIC HYSTERECTOMY N/A 6/23/2021    Procedure: ROBOTIC HYSTERECTOMY;  Surgeon: Puja Hearn MD;  Location: [facility];  Service: OB/GYN;  Laterality: N/A;    SALPINGECTOMY Bilateral 6/23/2021    Procedure: SALPINGECTOMY;  Surgeon: Puja Hearn MD;  Location: [facility];  Service: OB/GYN;  Laterality: Bilateral;    TOTAL REDUCTION MAMMOPLASTY      2010    TUBAL LIGATION      tummy tuck         Past Psychiatric History:  Previous Medication Trials:  Celexa, but no improvement    Previous Psychiatric Hospitalizations: no   Previous Suicide Attempts: no   History of Violence: no  Outpatient Psychiatrist: no  Outpatient Therapist: no  Family History: Father--substance abuse    Social History:  Grew Up: In house with parents/siblings (parents  in middle school)  Childhood/Developmental: Repeated 9th grade due to  difficulty with parent's divorce   Marital Status:   Children: 3   Employment Status/Info: currently employed--MA   Education: some college/technical   Special Ed:  see above  Housing Status: Home  History of phys/sexual abuse: no  Access to gun:  yes, locked up     Substance Abuse History:  Caffeine: Coffee/Cold Brew   Alcohol: Increased intake; See HPI   Recreational Drugs:  no  Tobacco Use: no  Rehab hx: No  Use of OTC: No    Legal History:  Past Charges/Incarcerations: no   Pending charges: no     Psychosocial Stressors: family, health, and occupational    Review Of Systems:     Pertinent items are noted in HPI.    Current Evaluation:     Nutritional Screening: Considering the patient's height and weight, medications, medical history and preferences, should a referral be made to the dietitian? no    Constitutional  Vitals:  Most recent vital signs, dated less than 90 days prior to this appointment, were reviewed.    Vitals:    03/15/23 1307   BP: (!) 175/111   Pulse: 79   Weight: 96.4 kg (212 lb 8.4 oz)        General:  unremarkable, age appropriate     Musculoskeletal  Muscle Strength/Tone:  not examined   Gait & Station:  non-ataxic     Psychiatric  Speech:  no latency; no press   Mood & Affect:  anxious, depressed  congruent and appropriate   Thought Process:  normal and logical   Associations:  intact   Thought Content:  no suicidality, no homicidality, delusions, or paranoia   Insight:  intact, has awareness of illness   Judgement: behavior is adequate to circumstances   Orientation:  grossly intact, person, place, situation, year   Memory: intact for content of interview   Language: grossly intact   Attention Span & Concentration:  able to focus   Fund of Knowledge:  intact and appropriate to age and level of education       Relevant Elements of Neurological Exam: normal gait    Functioning in Relationships:  Spouse/partner: Stable  Peers: Good  Employers: See HPI    Laboratory Data  Admission on  02/18/2023, Discharged on 02/18/2023   Component Date Value Ref Range Status    WBC 02/18/2023 7.85  3.90 - 12.70 K/uL Final    RBC 02/18/2023 5.02  4.00 - 5.40 M/uL Final    Hemoglobin 02/18/2023 13.0  12.0 - 16.0 g/dL Final    Hematocrit 02/18/2023 39.1  37.0 - 48.5 % Final    MCV 02/18/2023 78 (L)  82 - 98 fL Final    MCH 02/18/2023 25.9 (L)  27.0 - 31.0 pg Final    MCHC 02/18/2023 33.2  32.0 - 36.0 g/dL Final    RDW 02/18/2023 13.2  11.5 - 14.5 % Final    Platelets 02/18/2023 180  150 - 450 K/uL Final    MPV 02/18/2023 10.0  9.2 - 12.9 fL Final    Immature Granulocytes 02/18/2023 0.3  0.0 - 0.5 % Final    Gran # (ANC) 02/18/2023 5.6  1.8 - 7.7 K/uL Final    Immature Grans (Abs) 02/18/2023 0.02  0.00 - 0.04 K/uL Final    Lymph # 02/18/2023 1.7  1.0 - 4.8 K/uL Final    Mono # 02/18/2023 0.5  0.3 - 1.0 K/uL Final    Eos # 02/18/2023 0.1  0.0 - 0.5 K/uL Final    Baso # 02/18/2023 0.06  0.00 - 0.20 K/uL Final    nRBC 02/18/2023 0  0 /100 WBC Final    Gran % 02/18/2023 70.7  38.0 - 73.0 % Final    Lymph % 02/18/2023 21.0  18.0 - 48.0 % Final    Mono % 02/18/2023 6.1  4.0 - 15.0 % Final    Eosinophil % 02/18/2023 1.1  0.0 - 8.0 % Final    Basophil % 02/18/2023 0.8  0.0 - 1.9 % Final    Differential Method 02/18/2023 Automated   Final    Sodium 02/18/2023 140  136 - 145 mmol/L Final    Potassium 02/18/2023 3.9  3.5 - 5.1 mmol/L Final    Chloride 02/18/2023 109  95 - 110 mmol/L Final    CO2 02/18/2023 22 (L)  23 - 29 mmol/L Final    Glucose 02/18/2023 89  70 - 110 mg/dL Final    BUN 02/18/2023 7  6 - 20 mg/dL Final    Creatinine 02/18/2023 0.8  0.5 - 1.4 mg/dL Final    Calcium 02/18/2023 8.8  8.7 - 10.5 mg/dL Final    Total Protein 02/18/2023 7.7  6.0 - 8.4 g/dL Final    Albumin 02/18/2023 4.0  3.5 - 5.2 g/dL Final    Total Bilirubin 02/18/2023 0.5  0.1 - 1.0 mg/dL Final    Alkaline Phosphatase 02/18/2023 47 (L)  55 - 135 U/L Final    AST 02/18/2023 21  10 - 40 U/L Final    ALT 02/18/2023 16  10 - 44 U/L Final    Anion  Gap 02/18/2023 9  8 - 16 mmol/L Final    eGFR 02/18/2023 >60  >60 mL/min/1.73 m^2 Final    Specimen UA 02/18/2023 Urine, Clean Catch   Final    Color, UA 02/18/2023 Yellow  Yellow, Straw, Lesil Final    Appearance, UA 02/18/2023 Clear  Clear Final    pH, UA 02/18/2023 7.0  5.0 - 8.0 Final    Specific Gravity, UA 02/18/2023 1.015  1.005 - 1.030 Final    Protein, UA 02/18/2023 Trace (A)  Negative Final    Glucose, UA 02/18/2023 Negative  Negative Final    Ketones, UA 02/18/2023 Negative  Negative Final    Bilirubin (UA) 02/18/2023 Negative  Negative Final    Occult Blood UA 02/18/2023 Negative  Negative Final    Nitrite, UA 02/18/2023 Negative  Negative Final    Urobilinogen, UA 02/18/2023 Negative  <2.0 EU/dL Final    Leukocytes, UA 02/18/2023 Negative  Negative Final    Trichomonas 02/18/2023 None  None Final    Clue Cells 02/18/2023 None  None Final    Budding Yeast 02/18/2023 None  None Final    Fungal Hyphae 02/18/2023 None  None Final    WBC - Vaginal Screen 02/18/2023 Few (A)  None Final    Bacteria - Vaginal Screen 02/18/2023 Many (A)  None Final    Wet Prep Source 02/18/2023 Vagina   Final    Chlamydia, Amplified DNA 02/18/2023 Not Detected  Not Detected Final    N gonorrhoeae, amplified DNA 02/18/2023 Not Detected  Not Detected Final   Admission on 02/16/2023, Discharged on 02/16/2023   Component Date Value Ref Range Status    HIV 1/2 Ag/Ab 02/16/2023 Non-reactive  Non-reactive Final    Hepatitis C Ab 02/16/2023 Non-reactive  Non-reactive Final    WBC 02/16/2023 8.58  3.90 - 12.70 K/uL Final    RBC 02/16/2023 5.01  4.00 - 5.40 M/uL Final    Hemoglobin 02/16/2023 13.1  12.0 - 16.0 g/dL Final    Hematocrit 02/16/2023 39.6  37.0 - 48.5 % Final    MCV 02/16/2023 79 (L)  82 - 98 fL Final    MCH 02/16/2023 26.1 (L)  27.0 - 31.0 pg Final    MCHC 02/16/2023 33.1  32.0 - 36.0 g/dL Final    RDW 02/16/2023 13.3  11.5 - 14.5 % Final    Platelets 02/16/2023 177  150 - 450 K/uL Final    MPV 02/16/2023 10.1  9.2 - 12.9 fL  Final    Immature Granulocytes 02/16/2023 0.2  0.0 - 0.5 % Final    Gran # (ANC) 02/16/2023 5.1  1.8 - 7.7 K/uL Final    Immature Grans (Abs) 02/16/2023 0.02  0.00 - 0.04 K/uL Final    Lymph # 02/16/2023 2.8  1.0 - 4.8 K/uL Final    Mono # 02/16/2023 0.6  0.3 - 1.0 K/uL Final    Eos # 02/16/2023 0.1  0.0 - 0.5 K/uL Final    Baso # 02/16/2023 0.06  0.00 - 0.20 K/uL Final    nRBC 02/16/2023 0  0 /100 WBC Final    Gran % 02/16/2023 58.9  38.0 - 73.0 % Final    Lymph % 02/16/2023 32.1  18.0 - 48.0 % Final    Mono % 02/16/2023 6.5  4.0 - 15.0 % Final    Eosinophil % 02/16/2023 1.6  0.0 - 8.0 % Final    Basophil % 02/16/2023 0.7  0.0 - 1.9 % Final    Differential Method 02/16/2023 Automated   Final    Sodium 02/16/2023 138  136 - 145 mmol/L Final    Potassium 02/16/2023 3.8  3.5 - 5.1 mmol/L Final    Chloride 02/16/2023 106  95 - 110 mmol/L Final    CO2 02/16/2023 23  23 - 29 mmol/L Final    Glucose 02/16/2023 81  70 - 110 mg/dL Final    BUN 02/16/2023 11  6 - 20 mg/dL Final    Creatinine 02/16/2023 0.9  0.5 - 1.4 mg/dL Final    Calcium 02/16/2023 9.5  8.7 - 10.5 mg/dL Final    Total Protein 02/16/2023 7.7  6.0 - 8.4 g/dL Final    Albumin 02/16/2023 4.0  3.5 - 5.2 g/dL Final    Total Bilirubin 02/16/2023 0.4  0.1 - 1.0 mg/dL Final    Alkaline Phosphatase 02/16/2023 47 (L)  55 - 135 U/L Final    AST 02/16/2023 20  10 - 40 U/L Final    ALT 02/16/2023 16  10 - 44 U/L Final    Anion Gap 02/16/2023 9  8 - 16 mmol/L Final    eGFR 02/16/2023 >60.0  >60 mL/min/1.73 m^2 Final    Specimen UA 02/16/2023 Urine, Clean Catch   Final    Color, UA 02/16/2023 Colorless (A)  Yellow, Straw, Lesli Final    Appearance, UA 02/16/2023 Clear  Clear Final    pH, UA 02/16/2023 6.0  5.0 - 8.0 Final    Specific Gravity, UA 02/16/2023 1.015  1.005 - 1.030 Final    Protein, UA 02/16/2023 Negative  Negative Final    Glucose, UA 02/16/2023 Negative  Negative Final    Ketones, UA 02/16/2023 Negative  Negative Final    Bilirubin (UA) 02/16/2023 Negative   Negative Final    Occult Blood UA 2023 Negative  Negative Final    Nitrite, UA 2023 Negative  Negative Final    Leukocytes, UA 2023 Negative  Negative Final    POC Glucose 2023 78  70 - 110 mg/dL Final    POC BUN 2023 11  6 - 30 mg/dL Final    POC Creatinine 2023 0.6  0.5 - 1.4 mg/dL Final    POC Sodium 2023 139  136 - 145 mmol/L Final    POC Potassium 2023 3.4 (L)  3.5 - 5.1 mmol/L Final    POC Chloride 2023 104  95 - 110 mmol/L Final    POC TCO2 (MEASURED) 2023 22 (L)  23 - 29 mmol/L Final    POC Ionized Calcium 2023 1.16  1.06 - 1.42 mmol/L Final    POC Hematocrit 2023 41  36 - 54 %PCV Final    Sample 2023 BERNY   Final         Medications  Outpatient Encounter Medications as of 3/15/2023   Medication Sig Dispense Refill    [] acetaminophen (TYLENOL) 500 MG tablet Take 2 tablets (1,000 mg total) by mouth every 6 (six) hours as needed for Pain. 112 tablet 0    amLODIPine (NORVASC) 5 MG tablet Take 1 tablet (5 mg total) by mouth once daily. 30 tablet 11    citalopram (CELEXA) 10 MG tablet       [] ketorolac (TORADOL) 10 mg tablet Take 1 tablet (10 mg total) by mouth every 6 (six) hours. for 14 days 56 tablet 0    naproxen (NAPROSYN) 500 MG tablet Take 1 tablet (500 mg total) by mouth 2 (two) times daily as needed (pain). 10 tablet 0    terconazole (TERAZOL 7) 0.4 % Crea Place 1 applicator vaginally every evening. 7 g 0    [DISCONTINUED] naproxen (NAPROSYN) 500 MG tablet Take 1 tablet (500 mg total) by mouth 2 (two) times daily. (Patient not taking: Reported on 10/13/2022) 30 tablet 0     No facility-administered encounter medications on file as of 3/15/2023.         Assessment - Diagnosis - Goals:     Desire Kilpatrick, a 42 y.o. female, presenting for initial evaluation visit.     Impression:       ICD-10-CM ICD-9-CM   1. Depression, unspecified depression type  F32.A 311   2. Anxiety  F41.9 300.00       Strengths and  Liabilities: Strength: Patient accepts guidance/feedback, Strength: Patient is expressive/articulate., Strength: Patient is motivated for change.      Psych Meds:  START Wellbutrin 150mg daily  Discontinue Celexa  Labs/Vitals:  CBC, CMP, Thyroid Panel, B12, Folate  Therapy  Gave Patient therapy resource list and will refer   Other:  Continue diet/exercise    LAPMP Reviewed, no concerns        Discussed Return to clinic in 4 weeks      Discussed with Attending Psychiatry Staff: Dr. Anais Cartagena (Collette) MD Nell   Psychiatry: PGY-III Resident    [facility]

## 2023-03-15 NOTE — PROGRESS NOTES
Staff Note:     Pt interviewed and case discussed.  I agree with Resident's findings and treatment plan.  Wellbutrin recommended after failure of SSRI and HTN noted.  Seizure risk discussed.  Lab ordered.    TLK

## 2023-03-17 ENCOUNTER — PATIENT MESSAGE (OUTPATIENT)
Dept: ADMINISTRATIVE | Facility: OTHER | Age: 43
End: 2023-03-17
Payer: COMMERCIAL

## 2023-03-17 ENCOUNTER — OFFICE VISIT (OUTPATIENT)
Dept: INTERNAL MEDICINE | Facility: CLINIC | Age: 43
End: 2023-03-17
Attending: INTERNAL MEDICINE
Payer: COMMERCIAL

## 2023-03-17 VITALS
HEIGHT: 69 IN | SYSTOLIC BLOOD PRESSURE: 138 MMHG | OXYGEN SATURATION: 98 % | WEIGHT: 208.31 LBS | DIASTOLIC BLOOD PRESSURE: 96 MMHG | BODY MASS INDEX: 30.85 KG/M2 | HEART RATE: 80 BPM

## 2023-03-17 DIAGNOSIS — I10 BENIGN ESSENTIAL HYPERTENSION: Primary | ICD-10-CM

## 2023-03-17 DIAGNOSIS — R01.1 HEART MURMUR: ICD-10-CM

## 2023-03-17 PROCEDURE — 3075F PR MOST RECENT SYSTOLIC BLOOD PRESS GE 130-139MM HG: ICD-10-PCS | Mod: CPTII,S$GLB,, | Performed by: INTERNAL MEDICINE

## 2023-03-17 PROCEDURE — 99214 PR OFFICE/OUTPT VISIT, EST, LEVL IV, 30-39 MIN: ICD-10-PCS | Mod: S$GLB,,, | Performed by: INTERNAL MEDICINE

## 2023-03-17 PROCEDURE — 99999 PR PBB SHADOW E&M-EST. PATIENT-LVL III: CPT | Mod: PBBFAC,,, | Performed by: INTERNAL MEDICINE

## 2023-03-17 PROCEDURE — 4010F PR ACE/ARB THEARPY RXD/TAKEN: ICD-10-PCS | Mod: CPTII,S$GLB,, | Performed by: INTERNAL MEDICINE

## 2023-03-17 PROCEDURE — 3080F DIAST BP >= 90 MM HG: CPT | Mod: CPTII,S$GLB,, | Performed by: INTERNAL MEDICINE

## 2023-03-17 PROCEDURE — 3008F PR BODY MASS INDEX (BMI) DOCUMENTED: ICD-10-PCS | Mod: CPTII,S$GLB,, | Performed by: INTERNAL MEDICINE

## 2023-03-17 PROCEDURE — 1160F RVW MEDS BY RX/DR IN RCRD: CPT | Mod: CPTII,S$GLB,, | Performed by: INTERNAL MEDICINE

## 2023-03-17 PROCEDURE — 3080F PR MOST RECENT DIASTOLIC BLOOD PRESSURE >= 90 MM HG: ICD-10-PCS | Mod: CPTII,S$GLB,, | Performed by: INTERNAL MEDICINE

## 2023-03-17 PROCEDURE — 3008F BODY MASS INDEX DOCD: CPT | Mod: CPTII,S$GLB,, | Performed by: INTERNAL MEDICINE

## 2023-03-17 PROCEDURE — 4010F ACE/ARB THERAPY RXD/TAKEN: CPT | Mod: CPTII,S$GLB,, | Performed by: INTERNAL MEDICINE

## 2023-03-17 PROCEDURE — 3075F SYST BP GE 130 - 139MM HG: CPT | Mod: CPTII,S$GLB,, | Performed by: INTERNAL MEDICINE

## 2023-03-17 PROCEDURE — 1160F PR REVIEW ALL MEDS BY PRESCRIBER/CLIN PHARMACIST DOCUMENTED: ICD-10-PCS | Mod: CPTII,S$GLB,, | Performed by: INTERNAL MEDICINE

## 2023-03-17 PROCEDURE — 99999 PR PBB SHADOW E&M-EST. PATIENT-LVL III: ICD-10-PCS | Mod: PBBFAC,,, | Performed by: INTERNAL MEDICINE

## 2023-03-17 PROCEDURE — 1159F MED LIST DOCD IN RCRD: CPT | Mod: CPTII,S$GLB,, | Performed by: INTERNAL MEDICINE

## 2023-03-17 PROCEDURE — 1159F PR MEDICATION LIST DOCUMENTED IN MEDICAL RECORD: ICD-10-PCS | Mod: CPTII,S$GLB,, | Performed by: INTERNAL MEDICINE

## 2023-03-17 PROCEDURE — 99214 OFFICE O/P EST MOD 30 MIN: CPT | Mod: S$GLB,,, | Performed by: INTERNAL MEDICINE

## 2023-03-17 RX ORDER — AMLODIPINE AND VALSARTAN 5; 160 MG/1; MG/1
1 TABLET ORAL DAILY
Qty: 90 TABLET | Refills: 3 | Status: SHIPPED | OUTPATIENT
Start: 2023-03-17 | End: 2023-05-09

## 2023-03-17 NOTE — PROGRESS NOTES
"Subjective:       Patient ID: Desire Kilpatrick is a 42 y.o. female.    Chief Complaint: Hypertension    Here for urgent visit    ### HTN ###  Dx early 2023  Difficulty with daily adherence to amlodipine. No SE.    Told she has MVP.        Review of Systems   Constitutional:  Negative for chills, fatigue, fever and unexpected weight change.   HENT:  Negative for ear pain, hearing loss, postnasal drip, tinnitus, trouble swallowing and voice change.    Respiratory:  Negative for cough, chest tightness, shortness of breath and wheezing.    Cardiovascular:  Negative for chest pain, palpitations and leg swelling.   Gastrointestinal:  Negative for abdominal pain, blood in stool, diarrhea, nausea and vomiting.   Endocrine: Negative for polydipsia, polyphagia and polyuria.   Genitourinary:  Negative for difficulty urinating, dysuria, hematuria and vaginal bleeding.   Skin:  Negative for rash.   Allergic/Immunologic: Negative for food allergies.   Neurological:  Negative for dizziness, numbness and headaches.   Hematological:  Does not bruise/bleed easily.   Psychiatric/Behavioral:  The patient is not nervous/anxious.      Objective:      Vitals:    03/17/23 0956   BP: (!) 138/96   Pulse: 80   SpO2: 98%   Weight: 94.5 kg (208 lb 5.4 oz)   Height: 5' 9" (1.753 m)      Physical Exam  Vitals and nursing note reviewed.   Constitutional:       General: She is not in acute distress.     Appearance: Normal appearance. She is well-developed.   HENT:      Head: Normocephalic and atraumatic.      Mouth/Throat:      Pharynx: No oropharyngeal exudate.   Eyes:      General: No scleral icterus.     Conjunctiva/sclera: Conjunctivae normal.      Pupils: Pupils are equal, round, and reactive to light.   Neck:      Thyroid: No thyromegaly.   Cardiovascular:      Rate and Rhythm: Normal rate and regular rhythm.      Heart sounds: Normal heart sounds. No murmur heard.  Pulmonary:      Effort: Pulmonary effort is normal.      Breath sounds: " Normal breath sounds. No wheezing or rales.   Abdominal:      General: There is no distension.   Musculoskeletal:         General: No tenderness.   Lymphadenopathy:      Cervical: No cervical adenopathy.   Skin:     General: Skin is warm and dry.   Neurological:      Mental Status: She is alert and oriented to person, place, and time.   Psychiatric:         Behavior: Behavior normal.       Assessment:       1. Benign essential hypertension    2. Heart murmur        Plan:       Desire was seen today for hypertension.    Diagnoses and all orders for this visit:    Benign essential hypertension  -     Hypertension Digital Medicine ([facility]) Enrollment Order  -     Hypertension Digital Medicine ([facility]): Assign Onboarding Questionnaires  -     START amlodipine-valsartan (EXFORGE) 5-160 mg per tablet; Take 1 tablet by mouth once daily.  Heart murmur  -     Echo                 Andry Amato MD  Internal Medicine-[facility]        Side effects of medication(s) were discussed in detail and patient voiced understanding.  Patient will call back for any issues or complications.

## 2023-04-06 ENCOUNTER — PATIENT MESSAGE (OUTPATIENT)
Dept: INTERNAL MEDICINE | Facility: CLINIC | Age: 43
End: 2023-04-06
Payer: COMMERCIAL

## 2023-05-01 ENCOUNTER — TELEPHONE (OUTPATIENT)
Dept: SURGERY | Facility: CLINIC | Age: 43
End: 2023-05-01
Payer: COMMERCIAL

## 2023-05-01 NOTE — TELEPHONE ENCOUNTER
Spoke with pt regarding hemorrhoidectomy on 5/24. Pt requesting to reschedule to later date. Offered 6/7 but it does not work with job. Pt requests to cancel and will contact office when ready with potential dates.       ----- Message from Carmen Oconnell CMA sent at 5/1/2023 10:20 AM CDT -----  Regarding: Procedure  Contact: [phone]  Hi,    Pt states she would like to cancel scheduled procedure on 5/24/23, please call pt    Thank you

## 2023-05-29 ENCOUNTER — PATIENT MESSAGE (OUTPATIENT)
Dept: ADMINISTRATIVE | Facility: OTHER | Age: 43
End: 2023-05-29
Payer: COMMERCIAL

## 2023-06-05 ENCOUNTER — OFFICE VISIT (OUTPATIENT)
Dept: INTERNAL MEDICINE | Facility: CLINIC | Age: 43
End: 2023-06-05
Payer: COMMERCIAL

## 2023-06-05 VITALS
SYSTOLIC BLOOD PRESSURE: 155 MMHG | BODY MASS INDEX: 32.69 KG/M2 | WEIGHT: 220.69 LBS | DIASTOLIC BLOOD PRESSURE: 105 MMHG | HEIGHT: 69 IN | HEART RATE: 68 BPM

## 2023-06-05 DIAGNOSIS — Z71.41 ALCOHOL ABUSE COUNSELING AND SURVEILLANCE: ICD-10-CM

## 2023-06-05 DIAGNOSIS — F41.9 ANXIETY: ICD-10-CM

## 2023-06-05 DIAGNOSIS — I10 PRIMARY HYPERTENSION: Primary | ICD-10-CM

## 2023-06-05 DIAGNOSIS — M79.89 LEG SWELLING: ICD-10-CM

## 2023-06-05 DIAGNOSIS — F32.0 CURRENT MILD EPISODE OF MAJOR DEPRESSIVE DISORDER WITHOUT PRIOR EPISODE: ICD-10-CM

## 2023-06-05 PROCEDURE — 3080F DIAST BP >= 90 MM HG: CPT | Mod: CPTII,S$GLB,, | Performed by: STUDENT IN AN ORGANIZED HEALTH CARE EDUCATION/TRAINING PROGRAM

## 2023-06-05 PROCEDURE — 4010F PR ACE/ARB THEARPY RXD/TAKEN: ICD-10-PCS | Mod: CPTII,S$GLB,, | Performed by: STUDENT IN AN ORGANIZED HEALTH CARE EDUCATION/TRAINING PROGRAM

## 2023-06-05 PROCEDURE — 99999 PR PBB SHADOW E&M-EST. PATIENT-LVL III: CPT | Mod: PBBFAC,,, | Performed by: STUDENT IN AN ORGANIZED HEALTH CARE EDUCATION/TRAINING PROGRAM

## 2023-06-05 PROCEDURE — 99213 OFFICE O/P EST LOW 20 MIN: CPT | Mod: S$GLB,,, | Performed by: STUDENT IN AN ORGANIZED HEALTH CARE EDUCATION/TRAINING PROGRAM

## 2023-06-05 PROCEDURE — 3008F BODY MASS INDEX DOCD: CPT | Mod: CPTII,S$GLB,, | Performed by: STUDENT IN AN ORGANIZED HEALTH CARE EDUCATION/TRAINING PROGRAM

## 2023-06-05 PROCEDURE — 3008F PR BODY MASS INDEX (BMI) DOCUMENTED: ICD-10-PCS | Mod: CPTII,S$GLB,, | Performed by: STUDENT IN AN ORGANIZED HEALTH CARE EDUCATION/TRAINING PROGRAM

## 2023-06-05 PROCEDURE — 3077F PR MOST RECENT SYSTOLIC BLOOD PRESSURE >= 140 MM HG: ICD-10-PCS | Mod: CPTII,S$GLB,, | Performed by: STUDENT IN AN ORGANIZED HEALTH CARE EDUCATION/TRAINING PROGRAM

## 2023-06-05 PROCEDURE — 99213 PR OFFICE/OUTPT VISIT, EST, LEVL III, 20-29 MIN: ICD-10-PCS | Mod: S$GLB,,, | Performed by: STUDENT IN AN ORGANIZED HEALTH CARE EDUCATION/TRAINING PROGRAM

## 2023-06-05 PROCEDURE — 4010F ACE/ARB THERAPY RXD/TAKEN: CPT | Mod: CPTII,S$GLB,, | Performed by: STUDENT IN AN ORGANIZED HEALTH CARE EDUCATION/TRAINING PROGRAM

## 2023-06-05 PROCEDURE — 3077F SYST BP >= 140 MM HG: CPT | Mod: CPTII,S$GLB,, | Performed by: STUDENT IN AN ORGANIZED HEALTH CARE EDUCATION/TRAINING PROGRAM

## 2023-06-05 PROCEDURE — 3080F PR MOST RECENT DIASTOLIC BLOOD PRESSURE >= 90 MM HG: ICD-10-PCS | Mod: CPTII,S$GLB,, | Performed by: STUDENT IN AN ORGANIZED HEALTH CARE EDUCATION/TRAINING PROGRAM

## 2023-06-05 PROCEDURE — 99999 PR PBB SHADOW E&M-EST. PATIENT-LVL III: ICD-10-PCS | Mod: PBBFAC,,, | Performed by: STUDENT IN AN ORGANIZED HEALTH CARE EDUCATION/TRAINING PROGRAM

## 2023-06-05 RX ORDER — AMLODIPINE BESYLATE 5 MG/1
5 TABLET ORAL DAILY
Qty: 90 TABLET | Refills: 3 | Status: SHIPPED | OUTPATIENT
Start: 2023-06-05 | End: 2024-06-04

## 2023-06-05 NOTE — PROGRESS NOTES
"42 year old male is here for blood pressure follow up. Checks blood pressure at home.     06/04 (morning)  143/91.  06/01 (4 pm) 141/94  05/31 (3:46 pm) 149/100  05/25 (2:54 pm) 137/88  05/17 (4:43 pm) 144/96    Has occasionally headache and snoring. denies apnea, blurry vision, focal weakness, morning fatigue, chest pain, sob.  STOP BANG score 2. Was started on amlodopine-valsartan, but pt woke up with hives. Pt stopped the medication, which, per pt, improved the hive. Previously was on amlodopine, but was inconsistent with it.       Mental Health  Reports anxiety today. Was on citalopram, but stopped taking it recently because she thought that is driving HTN. Previously, on 10/13/12 visit,  "UNA score 12 and PHQ 9 score 8", but pt refusing to re do it today. She states that she is managing. She is seeing a psychologist.       Social  Has 3 kids. Previously struggled with mother responsibility, but now better b/c kids back in home for summer vacation.  works "weird hours". Has established care with psychologist. Exercises 3 times/week and tries to maintain a healthy diet.     Due for A1c and lipid panel, but did not want any further labwork today.      Preventative  Scheduled for mammogram. S/p hysterectomy with cervix removal. Due for flu vaccine.   Due for lipid panel and A1c, bu wants to defer. States pt has been doing diet and exercise.   Pap smear UTD.   EtOH use: 1 glass of wine every other night. 3 drinks at least at the weekend. 4-5 at max. AUDIT C score 3, consistent with misuse.        Past Medical History:   Diagnosis Date    Migraine headache     with aura    Mitral valve prolapse 2012    PONV (postoperative nausea and vomiting)      Past Surgical History:   Procedure Laterality Date    ARTHROSCOPIC CHONDROPLASTY OF KNEE JOINT Left 8/24/2018    Procedure: ARTHROSCOPY, KNEE, WITH CHONDROPLASTY;  Surgeon: Konrad Mckeon MD;  Location: [facility];  Service: Orthopedics;  Laterality: Left;    " ARTHROSCOPY OF KNEE Left 8/24/2018    Procedure: ARTHROSCOPY, KNEE;  Surgeon: Konrad Mckeon MD;  Location: [facility] OR;  Service: Orthopedics;  Laterality: Left;    Breast Reduction      COLONOSCOPY N/A 6/1/2022    Procedure: COLONOSCOPY;  Surgeon: Didier Contreras MD;  Location: [facility] ENDO (4TH FLR);  Service: Endoscopy;  Laterality: N/A;  fully vaccinated-GT    CYSTOSCOPY N/A 6/23/2021    Procedure: CYSTOSCOPY;  Surgeon: Puja Hearn MD;  Location: [facility] OR;  Service: OB/GYN;  Laterality: N/A;    HYSTERECTOMY      6/2020    LATERAL RETINACULA RELEASE OF KNEE Left 8/24/2018    Procedure: RELEASE, KNEE, LATERAL RETINACULA;  Surgeon: Konrad Mckeon MD;  Location: [facility] OR;  Service: Orthopedics;  Laterality: Left;    ROBOT-ASSISTED LAPAROSCOPIC HYSTERECTOMY N/A 6/23/2021    Procedure: ROBOTIC HYSTERECTOMY;  Surgeon: Puja Hearn MD;  Location: [facility] OR;  Service: OB/GYN;  Laterality: N/A;    SALPINGECTOMY Bilateral 6/23/2021    Procedure: SALPINGECTOMY;  Surgeon: Puja Hearn MD;  Location: [facility] OR;  Service: OB/GYN;  Laterality: Bilateral;    TOTAL REDUCTION MAMMOPLASTY      2010    TUBAL LIGATION      tummy tuck       Review of patient's allergies indicates:   Allergen Reactions    Valsartan Hives       No current outpatient medications on file prior to visit.     No current facility-administered medications on file prior to visit.     Social History     Socioeconomic History    Marital status:    Tobacco Use    Smoking status: Never    Smokeless tobacco: Never   Substance and Sexual Activity    Alcohol use: Yes     Comment: occasional    Drug use: Not Currently     Types: Marijuana     Comment: During weekends 1-2/week. Quit 3 years ago.    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      Review of Systems   Constitutional:  Negative for malaise/fatigue.   Eyes:  Negative for blurred vision.    Respiratory:  Negative for shortness of breath.    Cardiovascular:  Negative for chest pain, palpitations, orthopnea and PND.   Musculoskeletal:  Negative for neck pain.   Neurological:  Negative for headaches.     Vitals:    06/05/23 0835   BP: (!) 155/105   Pulse:      Physical Exam  HENT:      Head: Normocephalic and atraumatic.   Eyes:      Conjunctiva/sclera: Conjunctivae normal.      Pupils: Pupils are equal, round, and reactive to light.   Cardiovascular:      Rate and Rhythm: Normal rate and regular rhythm.   Pulmonary:      Effort: Pulmonary effort is normal.      Breath sounds: Normal breath sounds.   Abdominal:      General: Bowel sounds are normal.      Palpations: Abdomen is soft.   Musculoskeletal:         General: Normal range of motion.      Cervical back: Normal range of motion and neck supple.      Right lower leg: Edema present.      Left lower leg: Edema present.      Comments: Trace pedal edema b/l   Skin:     General: Skin is warm and dry.   Neurological:      Mental Status: She is alert and oriented to person, place, and time.      Gait: Gait is intact.   Psychiatric:         Mood and Affect: Mood and affect normal.         Cognition and Memory: Memory normal.         Judgment: Judgment normal.       A/P   1. Primary hypertension  Assessment & Plan:  - Not taking any medication now.   - Valsartan caused hives.   - Previously was on amlodipine with no issues, but was taking intermittently.   - Will restart back.   - F/u with digital hypertension and pcp     Orders:  -     amLODIPine (NORVASC) 5 MG tablet; Take 1 tablet (5 mg total) by mouth once daily.  Dispense: 90 tablet; Refill: 3  -     EKG 12-lead; Future    2. Anxiety  Comments:  - Does not want any medication intervention.   - Follows is psychology.   - No SI/Hi    3. Current mild episode of major depressive disorder without prior episode    4. Leg swelling  Assessment & Plan:  - Trace pedal edema noted b/l.   - DDX include diastolic  chf, phtn.   - Echo was ordered by Dr. Bryson. Asked pt do schedule it.       5. Alcohol abuse counseling and surveillance  Assessment & Plan:  AUDIT C score 3  Educated about importance of reducing alcohol use.         Pt to follow up with new PCP in 3-6 months. Due for A1c and lipid panel, but did not want any further labwork today.

## 2023-06-05 NOTE — PATIENT INSTRUCTIONS
- f/u with digital hypertension program   - Please go see the ER if you develop chest pain, shortness of breath, blurry vision, or weakness on any arm or leg.

## 2023-06-05 NOTE — ASSESSMENT & PLAN NOTE
- Trace pedal edema noted b/l.   - DDX include diastolic chf, phtn.   - Echo was ordered by Dr. Bryson. Asked pt do schedule it.

## 2023-06-05 NOTE — ASSESSMENT & PLAN NOTE
- Not taking any medication now.   - Valsartan caused hives.   - Previously was on amlodipine with no issues, but was taking intermittently.   - Will restart back.   - F/u with digital hypertension and pcp

## 2023-06-16 ENCOUNTER — HOSPITAL ENCOUNTER (OUTPATIENT)
Dept: CARDIOLOGY | Facility: CLINIC | Age: 43
Discharge: HOME | End: 2023-06-16
Payer: COMMERCIAL

## 2023-06-16 DIAGNOSIS — I10 PRIMARY HYPERTENSION: ICD-10-CM

## 2023-06-16 PROCEDURE — 93010 ELECTROCARDIOGRAM REPORT: CPT | Mod: S$GLB,,, | Performed by: INTERNAL MEDICINE

## 2023-06-16 PROCEDURE — 93005 EKG 12-LEAD: ICD-10-PCS | Mod: S$GLB,,, | Performed by: STUDENT IN AN ORGANIZED HEALTH CARE EDUCATION/TRAINING PROGRAM

## 2023-06-16 PROCEDURE — 93005 ELECTROCARDIOGRAM TRACING: CPT | Mod: S$GLB,,, | Performed by: STUDENT IN AN ORGANIZED HEALTH CARE EDUCATION/TRAINING PROGRAM

## 2023-06-16 PROCEDURE — 93010 EKG 12-LEAD: ICD-10-PCS | Mod: S$GLB,,, | Performed by: INTERNAL MEDICINE

## 2023-09-27 ENCOUNTER — OFFICE VISIT (OUTPATIENT)
Dept: OBSTETRICS AND GYNECOLOGY | Facility: CLINIC | Age: 43
End: 2023-09-27
Payer: COMMERCIAL

## 2023-09-27 VITALS
DIASTOLIC BLOOD PRESSURE: 82 MMHG | SYSTOLIC BLOOD PRESSURE: 133 MMHG | BODY MASS INDEX: 32.87 KG/M2 | WEIGHT: 222.69 LBS

## 2023-09-27 DIAGNOSIS — R30.0 DYSURIA: ICD-10-CM

## 2023-09-27 DIAGNOSIS — N89.8 VAGINAL DISCHARGE: ICD-10-CM

## 2023-09-27 DIAGNOSIS — N39.0 RECURRENT UTI: ICD-10-CM

## 2023-09-27 DIAGNOSIS — Z01.419 WELL WOMAN EXAM WITH ROUTINE GYNECOLOGICAL EXAM: Primary | ICD-10-CM

## 2023-09-27 PROCEDURE — 81514 NFCT DS BV&VAGINITIS DNA ALG: CPT | Performed by: STUDENT IN AN ORGANIZED HEALTH CARE EDUCATION/TRAINING PROGRAM

## 2023-09-27 PROCEDURE — 99396 PR PREVENTIVE VISIT,EST,40-64: ICD-10-PCS | Mod: S$GLB,,, | Performed by: STUDENT IN AN ORGANIZED HEALTH CARE EDUCATION/TRAINING PROGRAM

## 2023-09-27 PROCEDURE — 3008F PR BODY MASS INDEX (BMI) DOCUMENTED: ICD-10-PCS | Mod: CPTII,S$GLB,, | Performed by: STUDENT IN AN ORGANIZED HEALTH CARE EDUCATION/TRAINING PROGRAM

## 2023-09-27 PROCEDURE — 3075F PR MOST RECENT SYSTOLIC BLOOD PRESS GE 130-139MM HG: ICD-10-PCS | Mod: CPTII,S$GLB,, | Performed by: STUDENT IN AN ORGANIZED HEALTH CARE EDUCATION/TRAINING PROGRAM

## 2023-09-27 PROCEDURE — 3008F BODY MASS INDEX DOCD: CPT | Mod: CPTII,S$GLB,, | Performed by: STUDENT IN AN ORGANIZED HEALTH CARE EDUCATION/TRAINING PROGRAM

## 2023-09-27 PROCEDURE — 99999 PR PBB SHADOW E&M-EST. PATIENT-LVL III: CPT | Mod: PBBFAC,,, | Performed by: STUDENT IN AN ORGANIZED HEALTH CARE EDUCATION/TRAINING PROGRAM

## 2023-09-27 PROCEDURE — 4010F ACE/ARB THERAPY RXD/TAKEN: CPT | Mod: CPTII,S$GLB,, | Performed by: STUDENT IN AN ORGANIZED HEALTH CARE EDUCATION/TRAINING PROGRAM

## 2023-09-27 PROCEDURE — 3075F SYST BP GE 130 - 139MM HG: CPT | Mod: CPTII,S$GLB,, | Performed by: STUDENT IN AN ORGANIZED HEALTH CARE EDUCATION/TRAINING PROGRAM

## 2023-09-27 PROCEDURE — 3079F PR MOST RECENT DIASTOLIC BLOOD PRESSURE 80-89 MM HG: ICD-10-PCS | Mod: CPTII,S$GLB,, | Performed by: STUDENT IN AN ORGANIZED HEALTH CARE EDUCATION/TRAINING PROGRAM

## 2023-09-27 PROCEDURE — 3079F DIAST BP 80-89 MM HG: CPT | Mod: CPTII,S$GLB,, | Performed by: STUDENT IN AN ORGANIZED HEALTH CARE EDUCATION/TRAINING PROGRAM

## 2023-09-27 PROCEDURE — 99396 PREV VISIT EST AGE 40-64: CPT | Mod: S$GLB,,, | Performed by: STUDENT IN AN ORGANIZED HEALTH CARE EDUCATION/TRAINING PROGRAM

## 2023-09-27 PROCEDURE — 1159F MED LIST DOCD IN RCRD: CPT | Mod: CPTII,S$GLB,, | Performed by: STUDENT IN AN ORGANIZED HEALTH CARE EDUCATION/TRAINING PROGRAM

## 2023-09-27 PROCEDURE — 87088 URINE BACTERIA CULTURE: CPT | Performed by: STUDENT IN AN ORGANIZED HEALTH CARE EDUCATION/TRAINING PROGRAM

## 2023-09-27 PROCEDURE — 1159F PR MEDICATION LIST DOCUMENTED IN MEDICAL RECORD: ICD-10-PCS | Mod: CPTII,S$GLB,, | Performed by: STUDENT IN AN ORGANIZED HEALTH CARE EDUCATION/TRAINING PROGRAM

## 2023-09-27 PROCEDURE — 99999 PR PBB SHADOW E&M-EST. PATIENT-LVL III: ICD-10-PCS | Mod: PBBFAC,,, | Performed by: STUDENT IN AN ORGANIZED HEALTH CARE EDUCATION/TRAINING PROGRAM

## 2023-09-27 PROCEDURE — 4010F PR ACE/ARB THEARPY RXD/TAKEN: ICD-10-PCS | Mod: CPTII,S$GLB,, | Performed by: STUDENT IN AN ORGANIZED HEALTH CARE EDUCATION/TRAINING PROGRAM

## 2023-09-27 PROCEDURE — 87086 URINE CULTURE/COLONY COUNT: CPT | Performed by: STUDENT IN AN ORGANIZED HEALTH CARE EDUCATION/TRAINING PROGRAM

## 2023-09-27 RX ORDER — NITROFURANTOIN 25; 75 MG/1; MG/1
100 CAPSULE ORAL 2 TIMES DAILY
Qty: 14 CAPSULE | Refills: 0 | Status: SHIPPED | OUTPATIENT
Start: 2023-09-27 | End: 2023-10-04

## 2023-09-27 RX ORDER — FLUCONAZOLE 150 MG/1
TABLET ORAL
Qty: 2 TABLET | Refills: 0 | Status: SHIPPED | OUTPATIENT
Start: 2023-09-27 | End: 2023-12-13

## 2023-09-27 RX ORDER — NITROFURANTOIN 25; 75 MG/1; MG/1
100 CAPSULE ORAL DAILY PRN
Qty: 90 CAPSULE | Refills: 0 | Status: SHIPPED | OUTPATIENT
Start: 2023-09-27 | End: 2023-12-13

## 2023-09-27 NOTE — PROGRESS NOTES
CC: Well woman exam    HPI:  Desire Kilpatrick is a 43 y.o. female  presents for a well woman exam.  She reports some itchy white vaginal discharge for past few days. History of recurrent UTIs especially after sex, currently having dysuria. No fever.       Patient history:   Past Medical History:   Diagnosis Date    Migraine headache     with aura    Mitral valve prolapse     PONV (postoperative nausea and vomiting)      Past Surgical History:   Procedure Laterality Date    ARTHROSCOPIC CHONDROPLASTY OF KNEE JOINT Left 2018    Procedure: ARTHROSCOPY, KNEE, WITH CHONDROPLASTY;  Surgeon: Konrad Mckeon MD;  Location: [facility];  Service: Orthopedics;  Laterality: Left;    ARTHROSCOPY OF KNEE Left 2018    Procedure: ARTHROSCOPY, KNEE;  Surgeon: Konrad Mckeon MD;  Location: [facility] OR;  Service: Orthopedics;  Laterality: Left;    Breast Reduction      COLONOSCOPY N/A 2022    Procedure: COLONOSCOPY;  Surgeon: Didier Contreras MD;  Location: [facility] ([address] [place] [address]);  Service: Endoscopy;  Laterality: N/A;  fully vaccinated-GT    CYSTOSCOPY N/A 2021    Procedure: CYSTOSCOPY;  Surgeon: Puja Hearn MD;  Location: [facility] OR;  Service: OB/GYN;  Laterality: N/A;    HYSTERECTOMY      2020    LATERAL RETINACULA RELEASE OF KNEE Left 2018    Procedure: RELEASE, KNEE, LATERAL RETINACULA;  Surgeon: Konrad Mckeon MD;  Location: [facility];  Service: Orthopedics;  Laterality: Left;    ROBOT-ASSISTED LAPAROSCOPIC HYSTERECTOMY N/A 2021    Procedure: ROBOTIC HYSTERECTOMY;  Surgeon: Puja Hearn MD;  Location: [facility] OR;  Service: OB/GYN;  Laterality: N/A;    SALPINGECTOMY Bilateral 2021    Procedure: SALPINGECTOMY;  Surgeon: Puja Hearn MD;  Location: [facility] OR;  Service: OB/GYN;  Laterality: Bilateral;    TOTAL REDUCTION MAMMOPLASTY      2010    TUBAL LIGATION      tummy tuck       OB History    Para Term  AB Living   7 6 3   1     SAB IAB Ectopic Multiple Live Births     1             # Outcome Date GA Lbr Vito/2nd Weight Sex Delivery Anes PTL Lv   7 Term            6 Term            5 Term            4 IAB            3 Para      Vag-Spont      2 Para      Vag-Spont      1 Para      Vag-Spont          GYN  Menopausal: No  History of abnormal paps: DENIES  Abnormal or postmenopausal bleeding: DENIES  History of abnormal mammograms:DENIES   Family history of breast or ovarian cancer:  yes  Any breast masses, pain, skin changes, or nipple discharge: DENIES  Possible recent STD exposure: denies  Contraception: s/p hysterectomy    Pap: 3/23/2021, s/p hysterectomy and no history of high grade dysplasia  Mammogram: BIRADS1, T-C=15.5%      Family History   Problem Relation Age of Onset    Breast cancer Mother 68    Hypertension Mother     Hypertension Brother     Breast cancer Maternal Aunt     Stomach cancer Maternal Aunt     Breast cancer Maternal Aunt     Breast cancer Maternal Aunt     Lupus Maternal Grandmother     Cancer Paternal Grandmother      Social History     Tobacco Use    Smoking status: Never    Smokeless tobacco: Never   Substance Use Topics    Alcohol use: Yes     Comment: occasional    Drug use: Not Currently     Types: Marijuana     Comment: During weekends 1-2/week. Quit 3 years ago.     Allergies: Valsartan    Current Outpatient Medications:     amLODIPine (NORVASC) 5 MG tablet, Take 1 tablet (5 mg total) by mouth once daily., Disp: 90 tablet, Rfl: 3    nitrofurantoin, macrocrystal-monohydrate, (MACROBID) 100 MG capsule, Take 1 capsule (100 mg total) by mouth 2 (two) times daily. for 7 days, Disp: 14 capsule, Rfl: 0    nitrofurantoin, macrocrystal-monohydrate, (MACROBID) 100 MG capsule, Take 1 capsule (100 mg total) by mouth daily as needed (after sex for prevention of UTI)., Disp: 90 capsule, Rfl: 0       ROS:  GENERAL: Denies weight gain or weight loss. Feeling well overall.   SKIN: Denies rash or lesions.   HEAD: Denies head injury or headache.   NODES: Denies enlarged  lymph nodes.   CHEST: Denies chest pain or shortness of breath.   CARDIOVASCULAR: Denies palpitations or left sided chest pain.   ABDOMEN: No abdominal pain, constipation, diarrhea, nausea, vomiting or rectal bleeding.   URINARY: No frequency, dysuria, hematuria, or burning on urination.  REPRODUCTIVE: See HPI.   BREASTS: The patient performs breast self-examination and denies pain, lumps, or nipple discharge.   HEMATOLOGIC: No easy bruisability or excessive bleeding.  MUSCULOSKELETAL: Denies joint pain or swelling.   NEUROLOGIC: Denies syncope or weakness.   PSYCHIATRIC: Denies depression, anxiety or mood swings.    Objective:   /82   Wt 101 kg (222 lb 10.6 oz)   LMP 06/07/2021 (Approximate)   BMI 32.87 kg/m²       Physical Exam:  APPEARANCE: Well nourished, well developed, in no acute distress.  AFFECT: WNL, alert and oriented x 3  SKIN: No acne or hirsutism  NECK: Neck symmetric without masses or thyromegaly  NODES: No inguinal, cervical, axillary, or femoral lymph node enlargement  CHEST: Good respiratory effect  ABDOMEN: Soft.  No tenderness or masses.  No hepatosplenomegaly.  No hernias.  BREASTS: Symmetrical, no skin changes or visible lesions.  No palpable masses, nipple discharge bilaterally.  PELVIC: Normal external genitalia without lesions.  Normal hair distribution.  Adequate perineal body, normal urethral meatus.  Vagina moist and well rugated without lesions, thin white discharge present.  Cervix absent.  No significant cystocele or rectocele.  Bimanual exam shows uterus absent, nontender.  Adnexa without masses or tenderness.  EXTREMITIES: No edema.    ASSESSMENT AND PLAN  1. Well woman exam with routine gynecological exam        2. Dysuria  Urine culture    nitrofurantoin, macrocrystal-monohydrate, (MACROBID) 100 MG capsule      3. Vaginal discharge  Vaginosis Screen by DNA Probe      4. Recurrent UTI  nitrofurantoin, macrocrystal-monohydrate, (MACROBID) 100 MG capsule          Annual  exam  Breast and pelvic exam: WNL  Patient counseled on ASCCP guidelines for cervical cytology screening  Cervical screening: not indicated, sp hyst with no history of high grade dysplasia   Patient counseled on current recommendations for breast cancer screening  Mammogram screening: up to date  Affirm collected for vaginal discharge  Discussed UTI treatment and post coital ppx, patient would like to proceed. Urine culture today  Osteoporosis screening at 65  Tobacco cessation counseling n/a    She was counseled to follow up with her PCP for other routine health maintenance      Follow up in about 1 year (around 9/27/2024), or if symptoms worsen or fail to improve.      Lesley Hutchinson MD  OBGYN [facility]

## 2023-09-29 LAB — BACTERIA UR CULT: ABNORMAL

## 2023-10-04 ENCOUNTER — PATIENT MESSAGE (OUTPATIENT)
Dept: OBSTETRICS AND GYNECOLOGY | Facility: CLINIC | Age: 43
End: 2023-10-04
Payer: COMMERCIAL

## 2023-10-04 DIAGNOSIS — R30.0 DYSURIA: Primary | ICD-10-CM

## 2023-10-04 RX ORDER — CEPHALEXIN 500 MG/1
500 CAPSULE ORAL 4 TIMES DAILY
Qty: 20 CAPSULE | Refills: 0 | Status: SHIPPED | OUTPATIENT
Start: 2023-10-04 | End: 2023-10-09

## 2023-10-04 NOTE — TELEPHONE ENCOUNTER
Pt has been itching all over since starting the macrobid. Pt is requesting that something else be called in

## 2023-11-20 ENCOUNTER — PATIENT MESSAGE (OUTPATIENT)
Dept: FAMILY MEDICINE | Facility: CLINIC | Age: 43
End: 2023-11-20

## 2023-11-20 ENCOUNTER — PATIENT MESSAGE (OUTPATIENT)
Dept: OBSTETRICS AND GYNECOLOGY | Facility: CLINIC | Age: 43
End: 2023-11-20
Payer: COMMERCIAL

## 2023-11-20 ENCOUNTER — HOSPITAL ENCOUNTER (OUTPATIENT)
Dept: RADIOLOGY | Facility: HOSPITAL | Age: 43
Discharge: HOME | End: 2023-11-20
Attending: FAMILY MEDICINE
Payer: COMMERCIAL

## 2023-11-20 ENCOUNTER — OFFICE VISIT (OUTPATIENT)
Dept: FAMILY MEDICINE | Facility: CLINIC | Age: 43
End: 2023-11-20
Payer: COMMERCIAL

## 2023-11-20 VITALS
WEIGHT: 221.56 LBS | DIASTOLIC BLOOD PRESSURE: 92 MMHG | HEART RATE: 72 BPM | HEIGHT: 69 IN | BODY MASS INDEX: 32.82 KG/M2 | SYSTOLIC BLOOD PRESSURE: 138 MMHG

## 2023-11-20 DIAGNOSIS — N83.201 CYST OF RIGHT OVARY: ICD-10-CM

## 2023-11-20 DIAGNOSIS — I10 PRIMARY HYPERTENSION: ICD-10-CM

## 2023-11-20 DIAGNOSIS — R10.31 RIGHT LOWER QUADRANT ABDOMINAL PAIN: ICD-10-CM

## 2023-11-20 DIAGNOSIS — R10.31 RIGHT LOWER QUADRANT ABDOMINAL PAIN: Primary | ICD-10-CM

## 2023-11-20 LAB
BILIRUB SERPL-MCNC: ABNORMAL MG/DL
BLOOD, POC UA: ABNORMAL
GLUCOSE UR QL STRIP: ABNORMAL
KETONES UR QL STRIP: ABNORMAL
LEUKOCYTE ESTERASE URINE, POC: ABNORMAL
NITRITE, POC UA: ABNORMAL
PH, POC UA: 6
PROTEIN, POC: ABNORMAL
SPECIFIC GRAVITY, POC UA: 1.02
UROBILINOGEN, POC UA: 0.2

## 2023-11-20 PROCEDURE — 1159F MED LIST DOCD IN RCRD: CPT | Mod: CPTII,S$GLB,, | Performed by: FAMILY MEDICINE

## 2023-11-20 PROCEDURE — 3080F PR MOST RECENT DIASTOLIC BLOOD PRESSURE >= 90 MM HG: ICD-10-PCS | Mod: CPTII,S$GLB,, | Performed by: FAMILY MEDICINE

## 2023-11-20 PROCEDURE — 76856 US PELVIS COMP WITH TRANSVAG NON-OB (XPD): ICD-10-PCS | Mod: 26,,, | Performed by: RADIOLOGY

## 2023-11-20 PROCEDURE — 1159F PR MEDICATION LIST DOCUMENTED IN MEDICAL RECORD: ICD-10-PCS | Mod: CPTII,S$GLB,, | Performed by: FAMILY MEDICINE

## 2023-11-20 PROCEDURE — 99214 OFFICE O/P EST MOD 30 MIN: CPT | Mod: S$GLB,,, | Performed by: FAMILY MEDICINE

## 2023-11-20 PROCEDURE — 3008F PR BODY MASS INDEX (BMI) DOCUMENTED: ICD-10-PCS | Mod: CPTII,S$GLB,, | Performed by: FAMILY MEDICINE

## 2023-11-20 PROCEDURE — 76830 TRANSVAGINAL US NON-OB: CPT | Mod: 26,,, | Performed by: RADIOLOGY

## 2023-11-20 PROCEDURE — 99999 PR PBB SHADOW E&M-EST. PATIENT-LVL III: CPT | Mod: PBBFAC,,, | Performed by: FAMILY MEDICINE

## 2023-11-20 PROCEDURE — 99999 PR PBB SHADOW E&M-EST. PATIENT-LVL III: ICD-10-PCS | Mod: PBBFAC,,, | Performed by: FAMILY MEDICINE

## 2023-11-20 PROCEDURE — 4010F PR ACE/ARB THEARPY RXD/TAKEN: ICD-10-PCS | Mod: CPTII,S$GLB,, | Performed by: FAMILY MEDICINE

## 2023-11-20 PROCEDURE — 99214 PR OFFICE/OUTPT VISIT, EST, LEVL IV, 30-39 MIN: ICD-10-PCS | Mod: S$GLB,,, | Performed by: FAMILY MEDICINE

## 2023-11-20 PROCEDURE — 3075F PR MOST RECENT SYSTOLIC BLOOD PRESS GE 130-139MM HG: ICD-10-PCS | Mod: CPTII,S$GLB,, | Performed by: FAMILY MEDICINE

## 2023-11-20 PROCEDURE — 76856 US EXAM PELVIC COMPLETE: CPT | Mod: 26,,, | Performed by: RADIOLOGY

## 2023-11-20 PROCEDURE — 81003 POCT URINALYSIS: ICD-10-PCS | Mod: QW,S$GLB,, | Performed by: FAMILY MEDICINE

## 2023-11-20 PROCEDURE — 81003 URINALYSIS AUTO W/O SCOPE: CPT | Mod: QW,S$GLB,, | Performed by: FAMILY MEDICINE

## 2023-11-20 PROCEDURE — 3008F BODY MASS INDEX DOCD: CPT | Mod: CPTII,S$GLB,, | Performed by: FAMILY MEDICINE

## 2023-11-20 PROCEDURE — 76830 US PELVIS COMP WITH TRANSVAG NON-OB (XPD): ICD-10-PCS | Mod: 26,,, | Performed by: RADIOLOGY

## 2023-11-20 PROCEDURE — 3080F DIAST BP >= 90 MM HG: CPT | Mod: CPTII,S$GLB,, | Performed by: FAMILY MEDICINE

## 2023-11-20 PROCEDURE — 3075F SYST BP GE 130 - 139MM HG: CPT | Mod: CPTII,S$GLB,, | Performed by: FAMILY MEDICINE

## 2023-11-20 PROCEDURE — 87086 URINE CULTURE/COLONY COUNT: CPT | Performed by: FAMILY MEDICINE

## 2023-11-20 PROCEDURE — 76830 TRANSVAGINAL US NON-OB: CPT | Mod: TC

## 2023-11-20 PROCEDURE — 4010F ACE/ARB THERAPY RXD/TAKEN: CPT | Mod: CPTII,S$GLB,, | Performed by: FAMILY MEDICINE

## 2023-11-20 NOTE — LETTER
November 20, 2023      [facility]  [address]  [address]  [place] [address]  Phone: [phone]  Fax: [phone]       Patient: Desire Kilpatrick   YOB: 1980  Date of Visit: 11/20/2023    To Whom It May Concern:    Cody Kilpatrick  was at [facility] on 11/20/2023. The patient may return to work 11/21/2023 with no restrictions. If you have any questions or concerns, or if I can be of further assistance, please do not hesitate to contact me.    Sincerely,    Emily Mora MA

## 2023-11-20 NOTE — PROGRESS NOTES
(Portions of this note were dictated using voice recognition software and may contain dictation related errors in spelling/grammar/syntax not found on text review)    CC:   Chief Complaint   Patient presents with    Abdominal Pain     Right side, pt stated is intense pain       HPI: 43 y.o. female with no PCP presented for evaluation of abdominal pain as a new patient to me for urgent care visit.  She has chronic medical conditions of migraine headaches with aura, mitral valve prolapse, hypertension.  Patient reports that pain started 10 days ago, located in right lower quadrant of abdomen and groin area, initially it was intermittent and she only had pain while getting up and walking, it became more consistent in the last 2 days, rates as 6/10 while sitting and 8/10 while walking, nonradiating, has associated chills but no fever, nausea and vomiting, bowel habits are regular, last bowel movement was yesterday morning which was normal. She has been taking ibuprofen with no significant relief. She was found to have right sided ovarian cyst on CT scan in feb in ED and concerned about it.  She denies fever, cough, shortness of breath, nausea, vomiting, dizziness, changes in bowel habits, urine symptoms.      Past Medical History:   Diagnosis Date    Migraine headache     with aura    Mitral valve prolapse 2012    PONV (postoperative nausea and vomiting)        Past Surgical History:   Procedure Laterality Date    ARTHROSCOPIC CHONDROPLASTY OF KNEE JOINT Left 8/24/2018    Procedure: ARTHROSCOPY, KNEE, WITH CHONDROPLASTY;  Surgeon: Konrad Mckeon MD;  Location: [facility];  Service: Orthopedics;  Laterality: Left;    ARTHROSCOPY OF KNEE Left 8/24/2018    Procedure: ARTHROSCOPY, KNEE;  Surgeon: Konrad Mckeon MD;  Location: [facility];  Service: Orthopedics;  Laterality: Left;    Breast Reduction      COLONOSCOPY N/A 6/1/2022    Procedure: COLONOSCOPY;  Surgeon: Didier Contreras MD;  Location: [facility] ([address] [place] [address]);   Service: Endoscopy;  Laterality: N/A;  fully vaccinated-GT    CYSTOSCOPY N/A 6/23/2021    Procedure: CYSTOSCOPY;  Surgeon: Puja Hearn MD;  Location: [facility] OR;  Service: OB/GYN;  Laterality: N/A;    HYSTERECTOMY      6/2020    LATERAL RETINACULA RELEASE OF KNEE Left 8/24/2018    Procedure: RELEASE, KNEE, LATERAL RETINACULA;  Surgeon: Konrad Mckeon MD;  Location: [facility] OR;  Service: Orthopedics;  Laterality: Left;    ROBOT-ASSISTED LAPAROSCOPIC HYSTERECTOMY N/A 6/23/2021    Procedure: ROBOTIC HYSTERECTOMY;  Surgeon: Puja Hearn MD;  Location: [facility] OR;  Service: OB/GYN;  Laterality: N/A;    SALPINGECTOMY Bilateral 6/23/2021    Procedure: SALPINGECTOMY;  Surgeon: Puja Hearn MD;  Location: [facility] OR;  Service: OB/GYN;  Laterality: Bilateral;    TOTAL REDUCTION MAMMOPLASTY      2010    TUBAL LIGATION      tummy davidck         Family History   Problem Relation Age of Onset    Breast cancer Mother 68    Hypertension Mother     Hypertension Brother     Breast cancer Maternal Aunt     Stomach cancer Maternal Aunt     Breast cancer Maternal Aunt     Breast cancer Maternal Aunt     Lupus Maternal Grandmother     Cancer Paternal Grandmother        Social History     Tobacco Use    Smoking status: Never    Smokeless tobacco: Never   Substance Use Topics    Alcohol use: Yes     Comment: occasional    Drug use: Not Currently     Types: Marijuana     Comment: During weekends 1-2/week. Quit 3 years ago.       Lab Results   Component Value Date    WBC 6.97 03/15/2023    HGB 13.5 03/15/2023    HCT 42.0 03/15/2023    MCV 79 (L) 03/15/2023     03/15/2023    CHOL 170 03/28/2022    TRIG 69 03/28/2022    HDL 41 03/28/2022    ALT 21 03/15/2023    AST 20 03/15/2023    BILITOT 0.4 03/15/2023    ALKPHOS 55 03/15/2023     03/15/2023    K 3.8 03/15/2023     03/15/2023    CREATININE 0.8 03/15/2023    ESTGFRAFRICA >60.0 03/28/2022    EGFRNONAA >60.0 03/28/2022    CALCIUM 9.4 03/15/2023    ALBUMIN 4.0 03/15/2023    BUN  9 03/15/2023    CO2 23 03/15/2023    TSH 0.586 03/15/2023    HGBA1C 5.5 03/28/2022    LDLCALC 115.2 03/28/2022    GLU 86 03/15/2023             Vital signs reviewed  PE:   APPEARANCE: Well nourished, well developed, in no acute distress.    HEAD: Normocephalic, atraumatic.  EYES: EOMI.  Conjunctivae noninjected.  NOSE: Mucosa pink. Airway clear.  NECK: Supple with no cervical lymphadenopathy.    CHEST: Good inspiratory effort. Lungs clear to auscultation with no wheezes or crackles.  CARDIOVASCULAR: Normal S1, S2. No rubs, murmurs, or gallops.  ABDOMEN: Bowel sounds normal. Not distended. Soft. TTP in RLQ and groin, No organomegaly.  EXTREMITIES: No edema, cyanosis, or clubbing.    Review of Systems   Constitutional:  Positive for chills. Negative for fatigue and fever.   HENT: Negative.     Respiratory:  Negative for cough, shortness of breath and wheezing.    Cardiovascular:  Negative for chest pain, palpitations and leg swelling.   Gastrointestinal:  Positive for abdominal pain. Negative for abdominal distention, blood in stool, change in bowel habit, constipation, diarrhea, nausea and vomiting.   Genitourinary: Negative.    Musculoskeletal: Negative.    Neurological: Negative.    Psychiatric/Behavioral: Negative.     All other systems reviewed and are negative.      IMPRESSION  1. Right lower quadrant abdominal pain    2. Cyst of right ovary    3. Primary hypertension            PLAN      1. Right lower quadrant abdominal pain    - POCT URINALYSIS  - US Pelvis Complete Non OB; Future  - Urine culture    Tylenol prn  Bowel regime prn  Hydration      2. Cyst of right ovary    - US Pelvis Complete Non OB; Future      Ordered urine test and ultrasound to evaluate ovarian cyst, work up for UTI  Advised to make follow up appt with gyne in case of persistent symptoms        3. Primary Hypertension    Fluctuating, compliant with meds  Continue current medications    Advised to monitor bp at home and follow up with  PCP      Age/demographic appropriate health maintenance:    Health Maintenance Due   Topic Date Due    Pneumococcal Vaccines (Age 0-64) (1 - PCV) Never done    Influenza Vaccine (1) 09/01/2023    COVID-19 Vaccine (3 - 2023-24 season) 09/01/2023         Return ED/UC precautions discussed      Teo Urban   11/20/2023

## 2023-11-20 NOTE — LETTER
November 20, 2023      [facility]  [address]  [address]  [place] [address]  Phone: [phone]  Fax: [phone]       Patient: Desire Kilpatrick   YOB: 1980  Date of Visit: 11/20/2023    To Whom It May Concern:    Cody Kilpatrick  was at [facility] on 11/20/2023. The patient may return to work 11/21/2023  with no restrictions. If you have any questions or concerns, or if I can be of further assistance, please do not hesitate to contact me.

## 2023-11-21 LAB
BACTERIA UR CULT: NORMAL
BACTERIA UR CULT: NORMAL

## 2023-12-19 ENCOUNTER — OFFICE VISIT (OUTPATIENT)
Dept: FAMILY MEDICINE | Facility: CLINIC | Age: 43
End: 2023-12-19
Payer: COMMERCIAL

## 2023-12-19 VITALS
WEIGHT: 224.88 LBS | SYSTOLIC BLOOD PRESSURE: 132 MMHG | BODY MASS INDEX: 33.31 KG/M2 | DIASTOLIC BLOOD PRESSURE: 100 MMHG | TEMPERATURE: 99 F | HEIGHT: 69 IN | OXYGEN SATURATION: 98 % | HEART RATE: 76 BPM

## 2023-12-19 DIAGNOSIS — R52 GENERALIZED BODY ACHES: ICD-10-CM

## 2023-12-19 DIAGNOSIS — J10.1 INFLUENZA B: Primary | ICD-10-CM

## 2023-12-19 DIAGNOSIS — R05.9 COUGH, UNSPECIFIED TYPE: ICD-10-CM

## 2023-12-19 LAB
CTP QC/QA: YES
CTP QC/QA: YES
POC MOLECULAR INFLUENZA A AGN: NEGATIVE
POC MOLECULAR INFLUENZA B AGN: POSITIVE
SARS-COV-2 RDRP RESP QL NAA+PROBE: NEGATIVE

## 2023-12-19 PROCEDURE — 3075F SYST BP GE 130 - 139MM HG: CPT | Mod: CPTII,S$GLB,, | Performed by: FAMILY MEDICINE

## 2023-12-19 PROCEDURE — 1159F PR MEDICATION LIST DOCUMENTED IN MEDICAL RECORD: ICD-10-PCS | Mod: CPTII,S$GLB,, | Performed by: FAMILY MEDICINE

## 2023-12-19 PROCEDURE — 99214 OFFICE O/P EST MOD 30 MIN: CPT | Mod: S$GLB,,, | Performed by: FAMILY MEDICINE

## 2023-12-19 PROCEDURE — 99999 PR PBB SHADOW E&M-EST. PATIENT-LVL III: CPT | Mod: PBBFAC,,, | Performed by: FAMILY MEDICINE

## 2023-12-19 PROCEDURE — 1159F MED LIST DOCD IN RCRD: CPT | Mod: CPTII,S$GLB,, | Performed by: FAMILY MEDICINE

## 2023-12-19 PROCEDURE — 87502 INFLUENZA DNA AMP PROBE: CPT | Mod: QW,S$GLB,, | Performed by: FAMILY MEDICINE

## 2023-12-19 PROCEDURE — 87502 POCT INFLUENZA A/B MOLECULAR: ICD-10-PCS | Mod: QW,S$GLB,, | Performed by: FAMILY MEDICINE

## 2023-12-19 PROCEDURE — 3080F PR MOST RECENT DIASTOLIC BLOOD PRESSURE >= 90 MM HG: ICD-10-PCS | Mod: CPTII,S$GLB,, | Performed by: FAMILY MEDICINE

## 2023-12-19 PROCEDURE — 87635 SARS-COV-2 COVID-19 AMP PRB: CPT | Mod: QW,S$GLB,, | Performed by: FAMILY MEDICINE

## 2023-12-19 PROCEDURE — 4010F PR ACE/ARB THEARPY RXD/TAKEN: ICD-10-PCS | Mod: CPTII,S$GLB,, | Performed by: FAMILY MEDICINE

## 2023-12-19 PROCEDURE — 3075F PR MOST RECENT SYSTOLIC BLOOD PRESS GE 130-139MM HG: ICD-10-PCS | Mod: CPTII,S$GLB,, | Performed by: FAMILY MEDICINE

## 2023-12-19 PROCEDURE — 3008F BODY MASS INDEX DOCD: CPT | Mod: CPTII,S$GLB,, | Performed by: FAMILY MEDICINE

## 2023-12-19 PROCEDURE — 99214 PR OFFICE/OUTPT VISIT, EST, LEVL IV, 30-39 MIN: ICD-10-PCS | Mod: S$GLB,,, | Performed by: FAMILY MEDICINE

## 2023-12-19 PROCEDURE — 87635: ICD-10-PCS | Mod: QW,S$GLB,, | Performed by: FAMILY MEDICINE

## 2023-12-19 PROCEDURE — 99999 PR PBB SHADOW E&M-EST. PATIENT-LVL III: ICD-10-PCS | Mod: PBBFAC,,, | Performed by: FAMILY MEDICINE

## 2023-12-19 PROCEDURE — 4010F ACE/ARB THERAPY RXD/TAKEN: CPT | Mod: CPTII,S$GLB,, | Performed by: FAMILY MEDICINE

## 2023-12-19 PROCEDURE — 3008F PR BODY MASS INDEX (BMI) DOCUMENTED: ICD-10-PCS | Mod: CPTII,S$GLB,, | Performed by: FAMILY MEDICINE

## 2023-12-19 PROCEDURE — 3080F DIAST BP >= 90 MM HG: CPT | Mod: CPTII,S$GLB,, | Performed by: FAMILY MEDICINE

## 2023-12-19 RX ORDER — OSELTAMIVIR PHOSPHATE 75 MG/1
75 CAPSULE ORAL 2 TIMES DAILY
Qty: 10 CAPSULE | Refills: 0 | Status: SHIPPED | OUTPATIENT
Start: 2023-12-19 | End: 2023-12-24

## 2023-12-19 NOTE — PROGRESS NOTES
(Portions of this note were dictated using voice recognition software and may contain dictation related errors in spelling/grammar/syntax not found on text review)    CC:   Chief Complaint   Patient presents with    Cough    Headache    Chills     Started 2 day ago        HPI: 43 y.o. female with medical conditions of migraine headaches, mitral valve prolapse, PONV presented with cough, headache and chills for urgent care visit.     She states symptoms started 2 days ago, she reports having chills, fever, had temperature of 100.4 F last night, also having headaches and whooping deep cough which is non productive. Her son was tested positive for flu 10 days ago. She is not vaccinated.     She has no other symptoms or concerns.     Past Medical History:   Diagnosis Date    Migraine headache     with aura    Mitral valve prolapse 2012    PONV (postoperative nausea and vomiting)        Past Surgical History:   Procedure Laterality Date    ARTHROSCOPIC CHONDROPLASTY OF KNEE JOINT Left 8/24/2018    Procedure: ARTHROSCOPY, KNEE, WITH CHONDROPLASTY;  Surgeon: Konrad Mckeon MD;  Location: [facility];  Service: Orthopedics;  Laterality: Left;    ARTHROSCOPY OF KNEE Left 8/24/2018    Procedure: ARTHROSCOPY, KNEE;  Surgeon: Konrad Mckeon MD;  Location: [facility];  Service: Orthopedics;  Laterality: Left;    Breast Reduction      COLONOSCOPY N/A 6/1/2022    Procedure: COLONOSCOPY;  Surgeon: Didier Contreras MD;  Location: [address]);  Service: Endoscopy;  Laterality: N/A;  fully vaccinated-GT    CYSTOSCOPY N/A 6/23/2021    Procedure: CYSTOSCOPY;  Surgeon: Puja Hearn MD;  Location: [facility] OR;  Service: OB/GYN;  Laterality: N/A;    HYSTERECTOMY      6/2020    LATERAL RETINACULA RELEASE OF KNEE Left 8/24/2018    Procedure: RELEASE, KNEE, LATERAL RETINACULA;  Surgeon: Konrad Mckeon MD;  Location: [facility];  Service: Orthopedics;  Laterality: Left;    ROBOT-ASSISTED LAPAROSCOPIC HYSTERECTOMY N/A 6/23/2021     Procedure: ROBOTIC HYSTERECTOMY;  Surgeon: Puja Hearn MD;  Location: [facility] OR;  Service: OB/GYN;  Laterality: N/A;    SALPINGECTOMY Bilateral 6/23/2021    Procedure: SALPINGECTOMY;  Surgeon: Puja Hearn MD;  Location: [facility] OR;  Service: OB/GYN;  Laterality: Bilateral;    TOTAL REDUCTION MAMMOPLASTY      2010    TUBAL LIGATION      tummy tuck         Family History   Problem Relation Age of Onset    Breast cancer Mother 68    Hypertension Mother     Hypertension Brother     Breast cancer Maternal Aunt     Stomach cancer Maternal Aunt     Breast cancer Maternal Aunt     Breast cancer Maternal Aunt     Lupus Maternal Grandmother     Cancer Paternal Grandmother        Social History     Tobacco Use    Smoking status: Never    Smokeless tobacco: Never   Substance Use Topics    Alcohol use: Yes     Comment: occasional    Drug use: Not Currently     Types: Marijuana     Comment: During weekends 1-2/week. Quit 3 years ago.       Lab Results   Component Value Date    WBC 6.97 03/15/2023    HGB 13.5 03/15/2023    HCT 42.0 03/15/2023    MCV 79 (L) 03/15/2023     03/15/2023    CHOL 170 03/28/2022    TRIG 69 03/28/2022    HDL 41 03/28/2022    ALT 21 03/15/2023    AST 20 03/15/2023    BILITOT 0.4 03/15/2023    ALKPHOS 55 03/15/2023     03/15/2023    K 3.8 03/15/2023     03/15/2023    CREATININE 0.8 03/15/2023    ESTGFRAFRICA >60.0 03/28/2022    EGFRNONAA >60.0 03/28/2022    CALCIUM 9.4 03/15/2023    ALBUMIN 4.0 03/15/2023    BUN 9 03/15/2023    CO2 23 03/15/2023    TSH 0.586 03/15/2023    HGBA1C 5.5 03/28/2022    LDLCALC 115.2 03/28/2022    GLU 86 03/15/2023             Vital signs reviewed  PE:   APPEARANCE: Well nourished, well developed, in no acute distress.    HEAD: Normocephalic, atraumatic.  EYES: EOMI.  Conjunctivae noninjected.  NOSE: Mucosa pink. Airway clear.  MOUTH & THROAT: No tonsillar enlargement. No pharyngeal erythema or exudate.   NECK: Supple with no cervical lymphadenopathy.     CHEST: Good inspiratory effort. Lungs clear to auscultation with no wheezes or crackles.  CARDIOVASCULAR: Normal S1, S2. No rubs, murmurs, or gallops.  ABDOMEN: Bowel sounds normal. Not distended. Soft. No tenderness or masses. No organomegaly.  EXTREMITIES: No edema, cyanosis, or clubbing.    Review of Systems   Constitutional:  Positive for chills, fatigue and fever.   HENT:  Positive for nasal congestion and rhinorrhea. Negative for ear discharge and ear pain.    Respiratory:  Positive for cough. Negative for shortness of breath and wheezing.    Cardiovascular: Negative.    Gastrointestinal: Negative.    Genitourinary: Negative.    Neurological: Negative.    Psychiatric/Behavioral: Negative.     All other systems reviewed and are negative.      IMPRESSION  1. Influenza B    2. Cough, unspecified type    3. Generalized body aches            PLAN      1. Influenza B    - oseltamivir (TAMIFLU) 75 MG capsule; Take 1 capsule (75 mg total) by mouth 2 (two) times daily. for 5 days  Dispense: 10 capsule; Refill: 0    Can add Flonase nasal spray for nasal congestion  Mouth wash gargles prn  Tylenol/ibuprofen as needed      Return ED/UC precautions discussed        2. Cough, unspecified type    - POCT COVID-19 Rapid Screening  - POCT Influenza A/B Molecular        3. Generalized body aches    - POCT COVID-19 Rapid Screening  - POCT Influenza A/B Molecular         Age/demographic appropriate health maintenance:    Health Maintenance Due   Topic Date Due    Pneumococcal Vaccines (Age 0-64) (1 - PCV) Never done    Influenza Vaccine (1) 09/01/2023    COVID-19 Vaccine (3 - 2023-24 season) 09/01/2023    Mammogram  03/14/2024           I have spent 35 mins of total time with the pt including face to face and non face to face encounter.        Teo Urban   12/19/2023

## 2024-01-12 ENCOUNTER — PATIENT OUTREACH (OUTPATIENT)
Dept: ADMINISTRATIVE | Facility: HOSPITAL | Age: 44
End: 2024-01-12
Payer: COMMERCIAL

## 2024-01-12 NOTE — PROGRESS NOTES
Care Everywhere updates requested and reviewed.  Immunizations reconciled. Media reports reviewed.  Duplicate HM overrides and  orders removed.  Overdue HM topic chart audit and/or requested.  Overdue lab testing linked to upcoming lab appointments if applies.          Health Maintenance Due   Topic Date Due    Pneumococcal Vaccines (Age 0-64) (1 - PCV) Never done    Influenza Vaccine (1) 2023    COVID-19 Vaccine (3 - -24 season) 2023    Mammogram  2024

## 2024-03-20 ENCOUNTER — HOSPITAL ENCOUNTER (OUTPATIENT)
Dept: RADIOLOGY | Facility: OTHER | Age: 44
Discharge: HOME | End: 2024-03-20
Attending: OBSTETRICS & GYNECOLOGY
Payer: COMMERCIAL

## 2024-03-20 DIAGNOSIS — Z12.31 ENCOUNTER FOR SCREENING MAMMOGRAM FOR BREAST CANCER: ICD-10-CM

## 2024-03-20 PROCEDURE — 77067 SCR MAMMO BI INCL CAD: CPT | Mod: TC

## 2024-03-20 PROCEDURE — 77067 SCR MAMMO BI INCL CAD: CPT | Mod: 26,,, | Performed by: RADIOLOGY

## 2024-03-20 PROCEDURE — 77063 BREAST TOMOSYNTHESIS BI: CPT | Mod: 26,,, | Performed by: RADIOLOGY

## 2024-04-06 ENCOUNTER — PATIENT MESSAGE (OUTPATIENT)
Dept: ADMINISTRATIVE | Facility: OTHER | Age: 44
End: 2024-04-06
Payer: COMMERCIAL

## 2024-04-12 ENCOUNTER — OFFICE VISIT (OUTPATIENT)
Dept: INTERNAL MEDICINE | Facility: CLINIC | Age: 44
End: 2024-04-12
Payer: COMMERCIAL

## 2024-04-12 ENCOUNTER — LAB VISIT (OUTPATIENT)
Dept: LAB | Facility: HOSPITAL | Age: 44
End: 2024-04-12
Payer: COMMERCIAL

## 2024-04-12 ENCOUNTER — TELEPHONE (OUTPATIENT)
Dept: INTERNAL MEDICINE | Facility: CLINIC | Age: 44
End: 2024-04-12
Payer: COMMERCIAL

## 2024-04-12 VITALS
HEART RATE: 68 BPM | DIASTOLIC BLOOD PRESSURE: 92 MMHG | HEIGHT: 69 IN | OXYGEN SATURATION: 99 % | SYSTOLIC BLOOD PRESSURE: 134 MMHG | BODY MASS INDEX: 32.95 KG/M2 | WEIGHT: 222.44 LBS

## 2024-04-12 DIAGNOSIS — R30.0 DYSURIA: Primary | ICD-10-CM

## 2024-04-12 DIAGNOSIS — R30.0 DYSURIA: ICD-10-CM

## 2024-04-12 LAB
BILIRUB UR QL STRIP: NEGATIVE
CLARITY UR REFRACT.AUTO: CLEAR
COLOR UR AUTO: YELLOW
GLUCOSE UR QL STRIP: NEGATIVE
HGB UR QL STRIP: NEGATIVE
KETONES UR QL STRIP: NEGATIVE
LEUKOCYTE ESTERASE UR QL STRIP: NEGATIVE
NITRITE UR QL STRIP: NEGATIVE
PH UR STRIP: 6 [PH] (ref 5–8)
PROT UR QL STRIP: NEGATIVE
SP GR UR STRIP: 1.02 (ref 1–1.03)
URN SPEC COLLECT METH UR: NORMAL

## 2024-04-12 PROCEDURE — 87186 SC STD MICRODIL/AGAR DIL: CPT | Performed by: PHYSICIAN ASSISTANT

## 2024-04-12 PROCEDURE — 81003 URINALYSIS AUTO W/O SCOPE: CPT | Performed by: PHYSICIAN ASSISTANT

## 2024-04-12 PROCEDURE — 3008F BODY MASS INDEX DOCD: CPT | Mod: CPTII,S$GLB,, | Performed by: PHYSICIAN ASSISTANT

## 2024-04-12 PROCEDURE — 99214 OFFICE O/P EST MOD 30 MIN: CPT | Mod: S$GLB,,, | Performed by: PHYSICIAN ASSISTANT

## 2024-04-12 PROCEDURE — 99999 PR PBB SHADOW E&M-EST. PATIENT-LVL III: CPT | Mod: PBBFAC,,, | Performed by: PHYSICIAN ASSISTANT

## 2024-04-12 PROCEDURE — 87088 URINE BACTERIA CULTURE: CPT | Performed by: PHYSICIAN ASSISTANT

## 2024-04-12 PROCEDURE — 3080F DIAST BP >= 90 MM HG: CPT | Mod: CPTII,S$GLB,, | Performed by: PHYSICIAN ASSISTANT

## 2024-04-12 PROCEDURE — 87086 URINE CULTURE/COLONY COUNT: CPT | Performed by: PHYSICIAN ASSISTANT

## 2024-04-12 PROCEDURE — 87077 CULTURE AEROBIC IDENTIFY: CPT | Performed by: PHYSICIAN ASSISTANT

## 2024-04-12 PROCEDURE — 1159F MED LIST DOCD IN RCRD: CPT | Mod: CPTII,S$GLB,, | Performed by: PHYSICIAN ASSISTANT

## 2024-04-12 PROCEDURE — 3075F SYST BP GE 130 - 139MM HG: CPT | Mod: CPTII,S$GLB,, | Performed by: PHYSICIAN ASSISTANT

## 2024-04-12 RX ORDER — FLUCONAZOLE 150 MG/1
150 TABLET ORAL
Qty: 1 TABLET | Refills: 0 | Status: SHIPPED | OUTPATIENT
Start: 2024-04-12 | End: 2024-04-15

## 2024-04-12 RX ORDER — CEPHALEXIN 500 MG/1
500 CAPSULE ORAL 3 TIMES DAILY
Qty: 21 CAPSULE | Refills: 0 | Status: SHIPPED | OUTPATIENT
Start: 2024-04-12 | End: 2024-04-19

## 2024-04-12 NOTE — TELEPHONE ENCOUNTER
Prescription should have been for Fluconazole 150 mg q72 for 3 doses - dispense three tabs only. With no refills. 3:51 PM called pharmacy to give verbal clarification. LINDSAY LOBO

## 2024-04-12 NOTE — TELEPHONE ENCOUNTER
----- Message from Miley Reece sent at 4/12/2024  2:25 PM CDT -----  Contact: Ms. Dutta Phone# [phone]  Ms. Dutta a Pharmacist at [facility] would like to get clarification on the directions for the patients Fluconazole.

## 2024-04-12 NOTE — PROGRESS NOTES
INTERNAL MEDICINE URGENT VISIT NOTE    CHIEF COMPLAINT     Chief Complaint   Patient presents with    Urinary Tract Infection       HPI     Desire Kilpatrick is a 43 y.o. female who presents for an urgent visit today.    PCP is No, Primary Doctor, patient is new to me.     UTI symptoms for 5 days   Had some discharge and strong odor to urine  Monistat 3 days -helped with odor but still having some dysuria.   Denies bleeding  Pt denies possibility of STI.   No fever or chills  No abd pain or back pain   No nausea or vomiting  Has had UTIs in the past and symptoms are very similar to this.       Past Medical History:  Past Medical History:   Diagnosis Date    Migraine headache     with aura    Mitral valve prolapse 2012    PONV (postoperative nausea and vomiting)        Home Medications:  Prior to Admission medications    Medication Sig Start Date End Date Taking? Authorizing Provider   amLODIPine (NORVASC) 5 MG tablet Take 1 tablet (5 mg total) by mouth once daily. 6/5/23 6/4/24  Wendy Carrion MD   hydroCHLOROthiazide (HYDRODIURIL) 12.5 MG Tab Take 1 tablet (12.5 mg total) by mouth once daily. 12/13/23 12/12/24  Andry Bryson MD       Review of Systems:  Review of Systems   Constitutional:  Negative for chills and fever.   HENT:  Negative for sore throat and trouble swallowing.    Eyes:  Negative for visual disturbance.   Respiratory:  Negative for cough and shortness of breath.    Cardiovascular:  Negative for chest pain.   Gastrointestinal:  Negative for abdominal pain, constipation, diarrhea, nausea and vomiting.   Genitourinary:  Positive for dysuria and vaginal discharge. Negative for flank pain.   Musculoskeletal:  Negative for back pain, neck pain and neck stiffness.   Skin:  Negative for rash.   Neurological:  Negative for dizziness, syncope, weakness and headaches.   Psychiatric/Behavioral:  Negative for confusion.        Health Maintainence:   Immunizations:  Health Maintenance         Date  Due Completion Date    Pneumococcal Vaccines (Age 0-64) (1 of 2 - PCV) Never done ---    Influenza Vaccine (1) 09/01/2023 10/13/2022    COVID-19 Vaccine (3 - 2023-24 season) 09/01/2023 3/11/2021    Mammogram 03/20/2025 3/20/2024    Hemoglobin A1c (Diabetic Prevention Screening) 03/28/2025 3/28/2022    TETANUS VACCINE 03/28/2032 3/28/2022             PHYSICAL EXAM     LMP 06/07/2021 (Approximate)     Physical Exam  Vitals and nursing note reviewed.   Constitutional:       Appearance: Normal appearance.      Comments: Healthy appearing female in NAD or apparent pain. She makes good eye contact, speaks in clear full sentences and ambulates with ease.        HENT:      Head: Normocephalic and atraumatic.      Nose: Nose normal.      Mouth/Throat:      Pharynx: Oropharynx is clear.   Eyes:      Conjunctiva/sclera: Conjunctivae normal.   Cardiovascular:      Rate and Rhythm: Normal rate and regular rhythm.      Pulses: Normal pulses.   Pulmonary:      Effort: No respiratory distress.   Abdominal:      Tenderness: There is no abdominal tenderness.   Musculoskeletal:         General: Normal range of motion.      Cervical back: No rigidity.   Skin:     General: Skin is warm and dry.      Capillary Refill: Capillary refill takes less than 2 seconds.      Findings: No rash.   Neurological:      General: No focal deficit present.      Mental Status: She is alert.      Gait: Gait normal.   Psychiatric:         Mood and Affect: Mood normal.         LABS     Lab Results   Component Value Date    HGBA1C 5.5 03/28/2022     CMP  Sodium   Date Value Ref Range Status   03/15/2023 138 136 - 145 mmol/L Final     Potassium   Date Value Ref Range Status   03/15/2023 3.8 3.5 - 5.1 mmol/L Final     Chloride   Date Value Ref Range Status   03/15/2023 104 95 - 110 mmol/L Final     CO2   Date Value Ref Range Status   03/15/2023 23 23 - 29 mmol/L Final     Glucose   Date Value Ref Range Status   03/15/2023 86 70 - 110 mg/dL Final     BUN   Date  Value Ref Range Status   03/15/2023 9 6 - 20 mg/dL Final     Creatinine   Date Value Ref Range Status   03/15/2023 0.8 0.5 - 1.4 mg/dL Final     Calcium   Date Value Ref Range Status   03/15/2023 9.4 8.7 - 10.5 mg/dL Final     Total Protein   Date Value Ref Range Status   03/15/2023 7.9 6.0 - 8.4 g/dL Final     Albumin   Date Value Ref Range Status   03/15/2023 4.0 3.5 - 5.2 g/dL Final     Total Bilirubin   Date Value Ref Range Status   03/15/2023 0.4 0.1 - 1.0 mg/dL Final     Comment:     For infants and newborns, interpretation of results should be based  on gestational age, weight and in agreement with clinical  observations.    Premature Infant recommended reference ranges:  Up to 24 hours.............<8.0 mg/dL  Up to 48 hours............<12.0 mg/dL  3-5 days..................<15.0 mg/dL  6-29 days.................<15.0 mg/dL       Alkaline Phosphatase   Date Value Ref Range Status   03/15/2023 55 55 - 135 U/L Final     AST   Date Value Ref Range Status   03/15/2023 20 10 - 40 U/L Final     ALT   Date Value Ref Range Status   03/15/2023 21 10 - 44 U/L Final     Anion Gap   Date Value Ref Range Status   03/15/2023 11 8 - 16 mmol/L Final     eGFR if    Date Value Ref Range Status   03/28/2022 >60.0 >60 mL/min/1.73 m^2 Final     eGFR if non    Date Value Ref Range Status   03/28/2022 >60.0 >60 mL/min/1.73 m^2 Final     Comment:     Calculation used to obtain the estimated glomerular filtration  rate (eGFR) is the CKD-EPI equation.        Lab Results   Component Value Date    WBC 6.97 03/15/2023    HGB 13.5 03/15/2023    HCT 42.0 03/15/2023    MCV 79 (L) 03/15/2023     03/15/2023     Lab Results   Component Value Date    CHOL 170 03/28/2022     Lab Results   Component Value Date    HDL 41 03/28/2022     Lab Results   Component Value Date    LDLCALC 115.2 03/28/2022     Lab Results   Component Value Date    TRIG 69 03/28/2022     Lab Results   Component Value Date    CHOLHDL  24.1 03/28/2022     Lab Results   Component Value Date    TSH 0.586 03/15/2023    [id number] 76 03/15/2023       ASSESSMENT/PLAN     Desire Kilpatrick is a 43 y.o. female     Desire was seen today for urinary tract infection. Will treat empirically with keflex and diflucan. UA and UCx pending. She is aware of ED prompts.     Diagnoses and all orders for this visit:    Dysuria  -     Urinalysis; Future  -     CULTURE, URINE; Future    Other orders  -     cephALEXin (KEFLEX) 500 MG capsule; Take 1 capsule (500 mg total) by mouth 3 (three) times daily. for 7 days  -     fluconazole (DIFLUCAN) 150 MG Tab; Take 1 tablet (150 mg total) by mouth every 72 hours. for 3 days     Patient was counseled on when and how to seek emergent care.       Yuli Dodson PA-C   Department of Internal Medicine - [facility]   8:07 AM      electronic

## 2024-04-15 LAB — BACTERIA UR CULT: ABNORMAL

## 2024-04-23 ENCOUNTER — PATIENT MESSAGE (OUTPATIENT)
Dept: INTERNAL MEDICINE | Facility: CLINIC | Age: 44
End: 2024-04-23
Payer: COMMERCIAL

## 2024-05-08 ENCOUNTER — LAB VISIT (OUTPATIENT)
Dept: LAB | Facility: OTHER | Age: 44
End: 2024-05-08
Attending: INTERNAL MEDICINE
Payer: COMMERCIAL

## 2024-05-08 DIAGNOSIS — I10 PRIMARY HYPERTENSION: ICD-10-CM

## 2024-05-08 LAB
ANION GAP SERPL CALC-SCNC: 10 MMOL/L (ref 8–16)
BUN SERPL-MCNC: 13 MG/DL (ref 6–20)
CALCIUM SERPL-MCNC: 9.5 MG/DL (ref 8.7–10.5)
CHLORIDE SERPL-SCNC: 105 MMOL/L (ref 95–110)
CO2 SERPL-SCNC: 24 MMOL/L (ref 23–29)
CREAT SERPL-MCNC: 1 MG/DL (ref 0.5–1.4)
EST. GFR  (NO RACE VARIABLE): >60 ML/MIN/1.73 M^2
GLUCOSE SERPL-MCNC: 87 MG/DL (ref 70–110)
POTASSIUM SERPL-SCNC: 3.8 MMOL/L (ref 3.5–5.1)
SODIUM SERPL-SCNC: 139 MMOL/L (ref 136–145)

## 2024-05-08 PROCEDURE — 80048 BASIC METABOLIC PNL TOTAL CA: CPT | Performed by: PHYSICIAN ASSISTANT

## 2024-05-08 PROCEDURE — 36415 COLL VENOUS BLD VENIPUNCTURE: CPT | Performed by: PHYSICIAN ASSISTANT

## 2024-05-13 ENCOUNTER — OFFICE VISIT (OUTPATIENT)
Dept: INTERNAL MEDICINE | Facility: CLINIC | Age: 44
End: 2024-05-13
Payer: COMMERCIAL

## 2024-05-13 VITALS
SYSTOLIC BLOOD PRESSURE: 144 MMHG | HEART RATE: 80 BPM | WEIGHT: 214.31 LBS | BODY MASS INDEX: 31.74 KG/M2 | OXYGEN SATURATION: 97 % | HEIGHT: 69 IN | DIASTOLIC BLOOD PRESSURE: 84 MMHG

## 2024-05-13 DIAGNOSIS — I10 PRIMARY HYPERTENSION: Primary | ICD-10-CM

## 2024-05-13 PROCEDURE — 3077F SYST BP >= 140 MM HG: CPT | Mod: CPTII,S$GLB,, | Performed by: PHYSICIAN ASSISTANT

## 2024-05-13 PROCEDURE — 99214 OFFICE O/P EST MOD 30 MIN: CPT | Mod: S$GLB,,, | Performed by: PHYSICIAN ASSISTANT

## 2024-05-13 PROCEDURE — 3079F DIAST BP 80-89 MM HG: CPT | Mod: CPTII,S$GLB,, | Performed by: PHYSICIAN ASSISTANT

## 2024-05-13 PROCEDURE — 99999 PR PBB SHADOW E&M-EST. PATIENT-LVL III: CPT | Mod: PBBFAC,,, | Performed by: PHYSICIAN ASSISTANT

## 2024-05-13 PROCEDURE — 3008F BODY MASS INDEX DOCD: CPT | Mod: CPTII,S$GLB,, | Performed by: PHYSICIAN ASSISTANT

## 2024-05-13 PROCEDURE — 1159F MED LIST DOCD IN RCRD: CPT | Mod: CPTII,S$GLB,, | Performed by: PHYSICIAN ASSISTANT

## 2024-05-13 RX ORDER — AMLODIPINE BESYLATE 10 MG/1
10 TABLET ORAL DAILY
Qty: 90 TABLET | Refills: 3 | Status: SHIPPED | OUTPATIENT
Start: 2024-05-13 | End: 2025-05-13

## 2024-05-13 NOTE — PROGRESS NOTES
Urgent Primary Care Office Visit       CHIEF COMPLAINT     The patient, Desire Kilpatrick, who is a 43 y.o. female presents for an annual exam.    HPI     PCP is Andry Bryson MD, patient is known to me.     Patient presents for elevated BP at home. She has been compliant with amlodipine 5 mg. She admits that she has tried both HCTZ and Valsartan in the past but both of these medications caused an allergic reaction - itching and hives. She reports that she is active on D-HTN program and has noticed that pressure has been 140/90 sometimes 150/100's at home. She denies any symptoms.     Past Medical History:  Past Medical History:   Diagnosis Date    Migraine headache     with aura    Mitral valve prolapse 2012    PONV (postoperative nausea and vomiting)        Past Surgical History:   Procedure Laterality Date    ARTHROSCOPIC CHONDROPLASTY OF KNEE JOINT Left 8/24/2018    Procedure: ARTHROSCOPY, KNEE, WITH CHONDROPLASTY;  Surgeon: Konrad Mckeon MD;  Location: [facility];  Service: Orthopedics;  Laterality: Left;    ARTHROSCOPY OF KNEE Left 8/24/2018    Procedure: ARTHROSCOPY, KNEE;  Surgeon: Konrad Mckeon MD;  Location: [facility] OR;  Service: Orthopedics;  Laterality: Left;    Breast Reduction      COLONOSCOPY N/A 6/1/2022    Procedure: COLONOSCOPY;  Surgeon: Didier Contreras MD;  Location: [address]);  Service: Endoscopy;  Laterality: N/A;  fully vaccinated-GT    CYSTOSCOPY N/A 6/23/2021    Procedure: CYSTOSCOPY;  Surgeon: Puja Hearn MD;  Location: [facility] OR;  Service: OB/GYN;  Laterality: N/A;    HYSTERECTOMY      6/2020    LATERAL RETINACULA RELEASE OF KNEE Left 8/24/2018    Procedure: RELEASE, KNEE, LATERAL RETINACULA;  Surgeon: Konrad Mckeon MD;  Location: [facility] OR;  Service: Orthopedics;  Laterality: Left;    ROBOT-ASSISTED LAPAROSCOPIC HYSTERECTOMY N/A 6/23/2021    Procedure: ROBOTIC HYSTERECTOMY;  Surgeon: Puja Hearn MD;  Location: [facility] OR;  Service: OB/GYN;  Laterality: N/A;     SALPINGECTOMY Bilateral 6/23/2021    Procedure: SALPINGECTOMY;  Surgeon: Puja Hearn MD;  Location: [facility] OR;  Service: OB/GYN;  Laterality: Bilateral;    TOTAL REDUCTION MAMMOPLASTY      2010    TUBAL LIGATION      tummy tuck          Family History   Problem Relation Name Age of Onset    Breast cancer Mother  68    Hypertension Mother      Hypertension Brother      Breast cancer Maternal Aunt      Stomach cancer Maternal Aunt      Breast cancer Maternal Aunt      Breast cancer Maternal Aunt      Lupus Maternal Grandmother      Cancer Paternal Grandmother          Social History     Socioeconomic History    Marital status:    Tobacco Use    Smoking status: Never    Smokeless tobacco: Never   Substance and Sexual Activity    Alcohol use: Yes     Comment: occasional    Drug use: Not Currently     Types: Marijuana     Comment: During weekends 1-2/week. Quit 3 years ago.    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      Social Determinants of Health     Financial Resource Strain: Low Risk  (11/20/2023)    Overall Financial Resource Strain (CARDIA)     Difficulty of Paying Living Expenses: Not hard at all   Food Insecurity: No Food Insecurity (11/20/2023)    Hunger Vital Sign     Worried About Running Out of Food in the Last Year: Never true     Ran Out of Food in the Last Year: Never true   Transportation Needs: No Transportation Needs (11/20/2023)    PRAPARE - Transportation     Lack of Transportation (Medical): No     Lack of Transportation (Non-Medical): No   Physical Activity: Sufficiently Active (11/20/2023)    Exercise Vital Sign     Days of Exercise per Week: 4 days     Minutes of Exercise per Session: 40 min   Stress: No Stress Concern Present (11/20/2023)    [employer] [place] [employer] - Occupational Stress Questionnaire     Feeling of Stress : Only a little   Housing Stability:  Unknown (11/20/2023)    Housing Stability Vital Sign     Unable to Pay for Housing in the Last Year: No     Unstable Housing in the Last Year: No        Social History     Tobacco Use   Smoking Status Never   Smokeless Tobacco Never        Allergies as of 05/13/2024 - Reviewed 04/12/2024   Allergen Reaction Noted    Valsartan Hives 06/05/2023    Hydrochlorothiazide Rash 04/12/2024          Home Medications:  Prior to Admission medications    Medication Sig Start Date End Date Taking? Authorizing Provider   amLODIPine (NORVASC) 5 MG tablet Take 1 tablet (5 mg total) by mouth once daily. 6/5/23 6/4/24  Wendy Carrion MD   hydroCHLOROthiazide (HYDRODIURIL) 12.5 MG Tab TAKE 1 TABLET BY MOUTH EVERY DAY 4/23/24   Andry Bryson MD       Review of Systems:  Review of Systems   Constitutional:  Negative for activity change and unexpected weight change.   HENT:  Negative for hearing loss, rhinorrhea and trouble swallowing.    Eyes:  Negative for discharge and visual disturbance.   Respiratory:  Negative for chest tightness and wheezing.    Cardiovascular:  Negative for chest pain and palpitations.   Gastrointestinal:  Negative for blood in stool, constipation, diarrhea and vomiting.   Endocrine: Negative for polydipsia and polyuria.   Genitourinary:  Negative for difficulty urinating, dysuria, hematuria and menstrual problem.   Musculoskeletal:  Negative for arthralgias, joint swelling and neck pain.   Neurological:  Positive for headaches.   Psychiatric/Behavioral:  Negative for dysphoric mood.        Health Maintainence:   Immunizations:  Health Maintenance         Date Due Completion Date    Pneumococcal Vaccines (Age 0-64) (1 of 2 - PCV) Never done ---    COVID-19 Vaccine (3 - 2023-24 season) 09/01/2023 3/11/2021    Influenza Vaccine (Season Ended) 09/01/2024 10/13/2022    Mammogram 03/20/2025 3/20/2024    Hemoglobin A1c (Diabetic Prevention Screening) 03/28/2025 3/28/2022    TETANUS VACCINE 03/28/2032 3/28/2022              PHYSICAL EXAM     LMP 06/07/2021 (Approximate)  There is no height or weight on file to calculate BMI.    Physical Exam  Vitals and nursing note reviewed.   Constitutional:       Appearance: Normal appearance.      Comments: Healthy appearing female in NAD or apparent pain. She makes good eye contact, speaks in clear full sentences and ambulates with ease.        HENT:      Head: Normocephalic and atraumatic.      Nose: Nose normal.      Mouth/Throat:      Pharynx: Oropharynx is clear.   Eyes:      Conjunctiva/sclera: Conjunctivae normal.   Cardiovascular:      Rate and Rhythm: Normal rate and regular rhythm.      Pulses: Normal pulses.   Pulmonary:      Effort: No respiratory distress.   Abdominal:      Tenderness: There is no abdominal tenderness.   Musculoskeletal:         General: Normal range of motion.      Cervical back: No rigidity.   Skin:     General: Skin is warm and dry.      Capillary Refill: Capillary refill takes less than 2 seconds.      Findings: No rash.   Neurological:      General: No focal deficit present.      Mental Status: She is alert.      Gait: Gait normal.   Psychiatric:         Mood and Affect: Mood normal.         LABS     Lab Results   Component Value Date    HGBA1C 5.5 03/28/2022     CMP  Sodium   Date Value Ref Range Status   05/08/2024 139 136 - 145 mmol/L Final     Potassium   Date Value Ref Range Status   05/08/2024 3.8 3.5 - 5.1 mmol/L Final     Chloride   Date Value Ref Range Status   05/08/2024 105 95 - 110 mmol/L Final     CO2   Date Value Ref Range Status   05/08/2024 24 23 - 29 mmol/L Final     Glucose   Date Value Ref Range Status   05/08/2024 87 70 - 110 mg/dL Final     BUN   Date Value Ref Range Status   05/08/2024 13 6 - 20 mg/dL Final     Creatinine   Date Value Ref Range Status   05/08/2024 1.0 0.5 - 1.4 mg/dL Final     Calcium   Date Value Ref Range Status   05/08/2024 9.5 8.7 - 10.5 mg/dL Final     Total Protein   Date Value Ref Range Status   03/15/2023  7.9 6.0 - 8.4 g/dL Final     Albumin   Date Value Ref Range Status   03/15/2023 4.0 3.5 - 5.2 g/dL Final     Total Bilirubin   Date Value Ref Range Status   03/15/2023 0.4 0.1 - 1.0 mg/dL Final     Comment:     For infants and newborns, interpretation of results should be based  on gestational age, weight and in agreement with clinical  observations.    Premature Infant recommended reference ranges:  Up to 24 hours.............<8.0 mg/dL  Up to 48 hours............<12.0 mg/dL  3-5 days..................<15.0 mg/dL  6-29 days.................<15.0 mg/dL       Alkaline Phosphatase   Date Value Ref Range Status   03/15/2023 55 55 - 135 U/L Final     AST   Date Value Ref Range Status   03/15/2023 20 10 - 40 U/L Final     ALT   Date Value Ref Range Status   03/15/2023 21 10 - 44 U/L Final     Anion Gap   Date Value Ref Range Status   05/08/2024 10 8 - 16 mmol/L Final     eGFR if    Date Value Ref Range Status   03/28/2022 >60.0 >60 mL/min/1.73 m^2 Final     eGFR if non    Date Value Ref Range Status   03/28/2022 >60.0 >60 mL/min/1.73 m^2 Final     Comment:     Calculation used to obtain the estimated glomerular filtration  rate (eGFR) is the CKD-EPI equation.        Lab Results   Component Value Date    WBC 6.97 03/15/2023    HGB 13.5 03/15/2023    HCT 42.0 03/15/2023    MCV 79 (L) 03/15/2023     03/15/2023     Lab Results   Component Value Date    CHOL 170 03/28/2022     Lab Results   Component Value Date    HDL 41 03/28/2022     Lab Results   Component Value Date    LDLCALC 115.2 03/28/2022     Lab Results   Component Value Date    TRIG 69 03/28/2022     Lab Results   Component Value Date    CHOLHDL 24.1 03/28/2022     Lab Results   Component Value Date    TSH 0.586 03/15/2023    [id number] 76 03/15/2023       ASSESSMENT/PLAN     Desire Kilpatrick is a 43 y.o. female    Desire was seen today for hypertension. Allergic to valsartan and hctz   Will increase amlodipine to 10mg  daily. Will follow-up with new PCP to establish care in 2-4 weeks for BP check     Diagnoses and all orders for this visit:    Primary hypertension    Other orders  -     amLODIPine (NORVASC) 10 MG tablet; Take 1 tablet (10 mg total) by mouth once daily.    Yuli Dodson PA-C  Department of Internal Medicine - [facility]   8:23 AM

## 2024-06-05 ENCOUNTER — PATIENT MESSAGE (OUTPATIENT)
Dept: INTERNAL MEDICINE | Facility: CLINIC | Age: 44
End: 2024-06-05
Payer: COMMERCIAL

## 2024-08-21 ENCOUNTER — OFFICE VISIT (OUTPATIENT)
Dept: PRIMARY CARE CLINIC | Facility: CLINIC | Age: 44
End: 2024-08-21
Payer: COMMERCIAL

## 2024-08-21 VITALS
WEIGHT: 205.69 LBS | HEART RATE: 99 BPM | SYSTOLIC BLOOD PRESSURE: 130 MMHG | OXYGEN SATURATION: 96 % | DIASTOLIC BLOOD PRESSURE: 84 MMHG | HEIGHT: 69 IN | BODY MASS INDEX: 30.47 KG/M2

## 2024-08-21 DIAGNOSIS — E66.9 OBESITY (BMI 30-39.9): ICD-10-CM

## 2024-08-21 DIAGNOSIS — I10 PRIMARY HYPERTENSION: Primary | ICD-10-CM

## 2024-08-21 PROBLEM — F32.0 CURRENT MILD EPISODE OF MAJOR DEPRESSIVE DISORDER WITHOUT PRIOR EPISODE: Status: RESOLVED | Noted: 2023-06-05 | Resolved: 2024-08-21

## 2024-08-21 PROCEDURE — 99999 PR PBB SHADOW E&M-EST. PATIENT-LVL III: CPT | Mod: PBBFAC,,, | Performed by: STUDENT IN AN ORGANIZED HEALTH CARE EDUCATION/TRAINING PROGRAM

## 2024-08-21 PROCEDURE — 3008F BODY MASS INDEX DOCD: CPT | Mod: CPTII,S$GLB,, | Performed by: STUDENT IN AN ORGANIZED HEALTH CARE EDUCATION/TRAINING PROGRAM

## 2024-08-21 PROCEDURE — 3079F DIAST BP 80-89 MM HG: CPT | Mod: CPTII,S$GLB,, | Performed by: STUDENT IN AN ORGANIZED HEALTH CARE EDUCATION/TRAINING PROGRAM

## 2024-08-21 PROCEDURE — 1160F RVW MEDS BY RX/DR IN RCRD: CPT | Mod: CPTII,S$GLB,, | Performed by: STUDENT IN AN ORGANIZED HEALTH CARE EDUCATION/TRAINING PROGRAM

## 2024-08-21 PROCEDURE — 99396 PREV VISIT EST AGE 40-64: CPT | Mod: S$GLB,,, | Performed by: STUDENT IN AN ORGANIZED HEALTH CARE EDUCATION/TRAINING PROGRAM

## 2024-08-21 PROCEDURE — 3075F SYST BP GE 130 - 139MM HG: CPT | Mod: CPTII,S$GLB,, | Performed by: STUDENT IN AN ORGANIZED HEALTH CARE EDUCATION/TRAINING PROGRAM

## 2024-08-21 PROCEDURE — 1159F MED LIST DOCD IN RCRD: CPT | Mod: CPTII,S$GLB,, | Performed by: STUDENT IN AN ORGANIZED HEALTH CARE EDUCATION/TRAINING PROGRAM

## 2024-08-21 NOTE — PROGRESS NOTES
"SUBJECTIVE     Chief Complaint   Patient presents with    Establish Care       HPI  Desire Kilpatrick is a 44 y.o. female with medical diagnoses as listed in the medical history and problem list that presents to establish care with a new provider.    Pt is not UTD on age appropriate CA screening.    Family, social, surgical Hx reviewed     HTN -   Currently prescribed amlodipine 10 mg daily.  Patient endorses taking medication as directed.  Denies side effects or concerns while taking medication.  Patient not currently checking BP at home.  Denies headaches, vision changes, CP, palpitations, or other concerning symptoms.  No results found for: "MICALBCREAT"  BP Readings from Last 3 Encounters:   08/21/24 130/84   05/13/24 (!) 144/84   04/12/24 (!) 134/92     Obesity:   BMI 30.38 in clinic today  Recently started on semaglutide injections, tolerating      Health Maintenance         Date Due Completion Date    Pneumococcal Vaccines (Age 0-64) (1 of 2 - PCV) Never done ---    COVID-19 Vaccine (3 - 2023-24 season) 09/01/2023 3/11/2021    Influenza Vaccine (1) 09/01/2024 10/13/2022    Mammogram 03/20/2025 3/20/2024    Hemoglobin A1c (Diabetic Prevention Screening) 03/28/2025 3/28/2022    TETANUS VACCINE 03/28/2032 3/28/2022              PAST MEDICAL HISTORY:  Past Medical History:   Diagnosis Date    Migraine headache     with aura    Mitral valve prolapse 2012    PONV (postoperative nausea and vomiting)        PAST SURGICAL HISTORY:  Past Surgical History:   Procedure Laterality Date    ARTHROSCOPIC CHONDROPLASTY OF KNEE JOINT Left 08/24/2018    Procedure: ARTHROSCOPY, KNEE, WITH CHONDROPLASTY;  Surgeon: Konrad Mckeon MD;  Location: [facility] OR;  Service: Orthopedics;  Laterality: Left;    ARTHROSCOPY OF KNEE Left 08/24/2018    Procedure: ARTHROSCOPY, KNEE;  Surgeon: Konrad Mckeon MD;  Location: [facility];  Service: Orthopedics;  Laterality: Left;    Breast Reduction      BREAST SURGERY  2010    Breast Reduction    " COLONOSCOPY N/A 06/01/2022    Procedure: COLONOSCOPY;  Surgeon: Didier Contreras MD;  [facility]);  Service: Endoscopy;  Laterality: N/A;  fully vaccinated-GT    COSMETIC SURGERY  Tummy tuck    CYSTOSCOPY N/A 06/23/2021    Procedure: CYSTOSCOPY;  Surgeon: Puja Hearn MD;  Location: [facility] OR;  Service: OB/GYN;  Laterality: N/A;    HYSTERECTOMY      6/2020    LATERAL RETINACULA RELEASE OF KNEE Left 08/24/2018    Procedure: RELEASE, KNEE, LATERAL RETINACULA;  Surgeon: Konrad Mckeon MD;  Location: [facility] OR;  Service: Orthopedics;  Laterality: Left;    ROBOT-ASSISTED LAPAROSCOPIC HYSTERECTOMY N/A 06/23/2021    Procedure: ROBOTIC HYSTERECTOMY;  Surgeon: Puja Hearn MD;  Location: [facility] OR;  Service: OB/GYN;  Laterality: N/A;    SALPINGECTOMY Bilateral 06/23/2021    Procedure: SALPINGECTOMY;  Surgeon: Puja Hearn MD;  Location: [facility] OR;  Service: OB/GYN;  Laterality: Bilateral;    TOTAL REDUCTION MAMMOPLASTY      2010    TUBAL LIGATION      tummy tuck         SOCIAL HISTORY:  Social History     Socioeconomic History    Marital status:    Tobacco Use    Smoking status: Never    Smokeless tobacco: Never   Substance and Sexual Activity    Alcohol use: Yes     Alcohol/week: 6.0 standard drinks of alcohol     Types: 3 Glasses of wine, 3 Drinks containing 0.5 oz of alcohol per week     Comment: occasional    Drug use: Not Currently     Types: Marijuana     Comment: During weekends 1-2/week. Quit 3 years ago.    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      Social Determinants of Health     Financial Resource Strain: Low Risk  (11/20/2023)    Overall Financial Resource Strain (CARDIA)     Difficulty of Paying Living Expenses: Not hard at all   Food Insecurity: No Food Insecurity (11/20/2023)    Hunger Vital Sign     Worried About Running Out of Food  in the Last Year: Never true     Ran Out of Food in the Last Year: Never true   Transportation Needs: No Transportation Needs (11/20/2023)    PRAPARE - Transportation     Lack of Transportation (Medical): No     Lack of Transportation (Non-Medical): No   Physical Activity: Sufficiently Active (11/20/2023)    Exercise Vital Sign     Days of Exercise per Week: 4 days     Minutes of Exercise per Session: 40 min   Stress: No Stress Concern Present (11/20/2023)    [employer] - Occupational Stress Questionnaire     Feeling of Stress : Only a little   Housing Stability: Unknown (11/20/2023)    Housing Stability Vital Sign     Unable to Pay for Housing in the Last Year: No     Unstable Housing in the Last Year: No       FAMILY HISTORY:  Family History   Problem Relation Name Age of Onset    Breast cancer Mother Jana Henderson 68    Hypertension Mother Jana Henderson     Hypertension Brother      Lupus Maternal Grandmother      Cancer Paternal Grandmother      Breast cancer Maternal Aunt          Great aunt    Stomach cancer Maternal Aunt      Breast cancer Maternal Aunt          great aunt    Breast cancer Maternal Aunt          great aunt       ALLERGIES AND MEDICATIONS: updated and reviewed.  Review of patient's allergies indicates:   Allergen Reactions    Valsartan Hives    Hydrochlorothiazide Rash     Current Outpatient Medications   Medication Sig Dispense Refill    amLODIPine (NORVASC) 10 MG tablet Take 1 tablet (10 mg total) by mouth once daily. 90 tablet 3    SEMAGLUTIDE, WEIGHT LOSS, SUBQ Inject 10 mLs into the skin once a week.       No current facility-administered medications for this visit.       ROS  Review of Systems   Constitutional:  Negative for fever and weight loss.   Respiratory:  Negative for cough and shortness of breath.    Cardiovascular:  Negative for chest pain and palpitations.   Gastrointestinal:  Negative for abdominal pain, constipation, diarrhea, nausea and  "vomiting.   Genitourinary:  Negative for dysuria.   Musculoskeletal:  Negative for back pain and joint pain.   Skin:  Negative for rash.   Neurological:  Negative for dizziness, weakness and headaches.   Psychiatric/Behavioral:  Negative for depression. The patient is not nervous/anxious.            OBJECTIVE     Physical Exam  Vitals:    08/21/24 1421   BP: 130/84   Pulse: 99    Body mass index is 30.38 kg/m².  Weight: 93.3 kg (205 lb 11 oz)   Height: 5' 9" (175.3 cm)     Physical Exam  HENT:      Head: Normocephalic and atraumatic.      Nose: Nose normal.      Mouth/Throat:      Mouth: Mucous membranes are moist.      Pharynx: Oropharynx is clear.   Eyes:      Extraocular Movements: Extraocular movements intact.      Conjunctiva/sclera: Conjunctivae normal.      Pupils: Pupils are equal, round, and reactive to light.   Cardiovascular:      Rate and Rhythm: Normal rate and regular rhythm.   Pulmonary:      Effort: Pulmonary effort is normal.      Breath sounds: Normal breath sounds.   Musculoskeletal:         General: No swelling. Normal range of motion.      Cervical back: Normal range of motion.      Right lower leg: No edema.      Left lower leg: No edema.   Skin:     General: Skin is warm.      Findings: No lesion or rash.   Neurological:      General: No focal deficit present.      Mental Status: She is alert and oriented to person, place, and time.      Motor: No weakness.   Psychiatric:         Mood and Affect: Mood normal.         Thought Content: Thought content normal.               ASSESSMENT     44 y.o. female with     1. Primary hypertension    2. Obesity (BMI 30-39.9)        PLAN:     1. Primary hypertension  Stable on medications, continue regimen    2. Obesity (BMI 30-39.9)  Stable on medications, continue regimen        RTC for annual exam    Kayla Barraza MD  08/21/2024 2:35 PM                        "

## 2024-10-08 ENCOUNTER — PATIENT MESSAGE (OUTPATIENT)
Dept: ADMINISTRATIVE | Facility: OTHER | Age: 44
End: 2024-10-08
Payer: COMMERCIAL

## 2024-10-15 ENCOUNTER — PATIENT MESSAGE (OUTPATIENT)
Dept: ADMINISTRATIVE | Facility: OTHER | Age: 44
End: 2024-10-15
Payer: COMMERCIAL

## 2024-11-08 ENCOUNTER — OFFICE VISIT (OUTPATIENT)
Dept: INTERNAL MEDICINE | Facility: CLINIC | Age: 44
End: 2024-11-08
Payer: COMMERCIAL

## 2024-11-08 VITALS
HEART RATE: 94 BPM | WEIGHT: 186.94 LBS | DIASTOLIC BLOOD PRESSURE: 68 MMHG | HEIGHT: 69 IN | BODY MASS INDEX: 27.69 KG/M2 | SYSTOLIC BLOOD PRESSURE: 120 MMHG | OXYGEN SATURATION: 99 %

## 2024-11-08 DIAGNOSIS — R68.89 FLU-LIKE SYMPTOMS: Primary | ICD-10-CM

## 2024-11-08 DIAGNOSIS — B34.9 ACUTE VIRAL SYNDROME: ICD-10-CM

## 2024-11-08 DIAGNOSIS — J02.9 PHARYNGITIS, UNSPECIFIED ETIOLOGY: ICD-10-CM

## 2024-11-08 LAB
CTP QC/QA: YES
POC MOLECULAR INFLUENZA A AGN: NEGATIVE
POC MOLECULAR INFLUENZA B AGN: NEGATIVE
S PYO RRNA THROAT QL PROBE: NEGATIVE
SARS-COV-2 RDRP RESP QL NAA+PROBE: NEGATIVE

## 2024-11-08 PROCEDURE — 99999 PR PBB SHADOW E&M-EST. PATIENT-LVL IV: CPT | Mod: PBBFAC,,, | Performed by: PHYSICIAN ASSISTANT

## 2024-11-08 RX ORDER — BENZONATATE 100 MG/1
100 CAPSULE ORAL 3 TIMES DAILY PRN
Qty: 30 CAPSULE | Refills: 0 | Status: SHIPPED | OUTPATIENT
Start: 2024-11-08 | End: 2024-11-18

## 2024-11-08 RX ORDER — AZELASTINE 1 MG/ML
1 SPRAY, METERED NASAL 2 TIMES DAILY
Qty: 30 ML | Refills: 11 | Status: SHIPPED | OUTPATIENT
Start: 2024-11-08 | End: 2025-11-08

## 2024-11-08 NOTE — PROGRESS NOTES
INTERNAL MEDICINE URGENT VISIT NOTE    CHIEF COMPLAINT     Chief Complaint   Patient presents with    Flu like illness       HPI     Desire Kilpatrick is a 44 y.o. female who presents for an urgent visit today.    PCP is Kayla Barraza MD, patient is known to me.     Symptoms started yesterday   Sore throat   Bodyaches   Headaches  No fever  Nasal congestion and mild cough   No sick contacts  No recent travel    Tried ibuprofen OTC helped temporarily       Past Medical History:  Past Medical History:   Diagnosis Date    Migraine headache     with aura    Mitral valve prolapse 2012    PONV (postoperative nausea and vomiting)        Home Medications:  Prior to Admission medications    Medication Sig Start Date End Date Taking? Authorizing Provider   amLODIPine (NORVASC) 10 MG tablet Take 1 tablet (10 mg total) by mouth once daily. 5/13/24 5/13/25 Yes Yuli Dodson, LASHELL   SEMAGLUTIDE, WEIGHT LOSS, SUBQ Inject 10 mLs into the skin once a week.   Yes Provider, Historical       Review of Systems:  Review of Systems   Constitutional:  Negative for chills and fever.   HENT:  Negative for sore throat and trouble swallowing.    Eyes:  Negative for visual disturbance.   Respiratory:  Negative for cough and shortness of breath.    Cardiovascular:  Negative for chest pain.   Gastrointestinal:  Negative for abdominal pain, constipation, diarrhea, nausea and vomiting.   Genitourinary:  Negative for dysuria and flank pain.   Musculoskeletal:  Negative for back pain, neck pain and neck stiffness.   Skin:  Negative for rash.   Neurological:  Negative for dizziness, syncope, weakness and headaches.   Psychiatric/Behavioral:  Negative for confusion.        Health Maintainence:   Immunizations:  Health Maintenance         Date Due Completion Date    Pneumococcal Vaccines (Age 0-64) (1 of 2 - PCV) Never done ---    Influenza Vaccine (1) 09/01/2024 10/13/2022    COVID-19 Vaccine (3 - 2024-25 season) 09/01/2024 3/11/2021     "Mammogram 03/20/2025 3/20/2024    Hemoglobin A1c (Diabetic Prevention Screening) 03/28/2025 3/28/2022    TETANUS VACCINE 03/28/2032 3/28/2022    RSV Vaccine (Age 60+ and Pregnant patients) (1 - 1-dose 75+ series) 08/11/2056 ---             PHYSICAL EXAM     /68 (BP Location: Left arm, Patient Position: Sitting)   Pulse 94   Ht 5' 9" (1.753 m)   Wt 84.8 kg (186 lb 15.2 oz)   LMP 06/07/2021 (Approximate)   SpO2 99%   BMI 27.61 kg/m²     Physical Exam  Vitals and nursing note reviewed.   Constitutional:       Appearance: Normal appearance.      Comments: Healthy appearing female in NAD or apparent pain. She makes good eye contact, speaks in clear full sentences and ambulates with ease. She sounds nasally congested when speaking  She manages oral secretions with ease  She has normal phonation        HENT:      Head: Normocephalic and atraumatic.      Nose: Nose normal.      Mouth/Throat:      Pharynx: Oropharynx is clear.      Comments: Throat is erythematous with no edema or exudate   Uvula is midline  There is no trismus     Eyes:      Conjunctiva/sclera: Conjunctivae normal.   Cardiovascular:      Rate and Rhythm: Normal rate and regular rhythm.      Pulses: Normal pulses.   Pulmonary:      Effort: No respiratory distress.   Abdominal:      Tenderness: There is no abdominal tenderness.   Musculoskeletal:         General: Normal range of motion.      Cervical back: No rigidity.   Skin:     General: Skin is warm and dry.      Capillary Refill: Capillary refill takes less than 2 seconds.      Findings: No rash.   Neurological:      General: No focal deficit present.      Mental Status: She is alert.      Gait: Gait normal.   Psychiatric:         Mood and Affect: Mood normal.         LABS     Lab Results   Component Value Date    HGBA1C 5.5 03/28/2022     CMP  Sodium   Date Value Ref Range Status   05/08/2024 139 136 - 145 mmol/L Final     Potassium   Date Value Ref Range Status   05/08/2024 3.8 3.5 - 5.1 " mmol/L Final     Chloride   Date Value Ref Range Status   05/08/2024 105 95 - 110 mmol/L Final     CO2   Date Value Ref Range Status   05/08/2024 24 23 - 29 mmol/L Final     Glucose   Date Value Ref Range Status   05/08/2024 87 70 - 110 mg/dL Final     BUN   Date Value Ref Range Status   05/08/2024 13 6 - 20 mg/dL Final     Creatinine   Date Value Ref Range Status   05/08/2024 1.0 0.5 - 1.4 mg/dL Final     Calcium   Date Value Ref Range Status   05/08/2024 9.5 8.7 - 10.5 mg/dL Final     Total Protein   Date Value Ref Range Status   03/15/2023 7.9 6.0 - 8.4 g/dL Final     Albumin   Date Value Ref Range Status   03/15/2023 4.0 3.5 - 5.2 g/dL Final     Total Bilirubin   Date Value Ref Range Status   03/15/2023 0.4 0.1 - 1.0 mg/dL Final     Comment:     For infants and newborns, interpretation of results should be based  on gestational age, weight and in agreement with clinical  observations.    Premature Infant recommended reference ranges:  Up to 24 hours.............<8.0 mg/dL  Up to 48 hours............<12.0 mg/dL  3-5 days..................<15.0 mg/dL  6-29 days.................<15.0 mg/dL       Alkaline Phosphatase   Date Value Ref Range Status   03/15/2023 55 55 - 135 U/L Final     AST   Date Value Ref Range Status   03/15/2023 20 10 - 40 U/L Final     ALT   Date Value Ref Range Status   03/15/2023 21 10 - 44 U/L Final     Anion Gap   Date Value Ref Range Status   05/08/2024 10 8 - 16 mmol/L Final     eGFR if    Date Value Ref Range Status   03/28/2022 >60.0 >60 mL/min/1.73 m^2 Final     eGFR if non    Date Value Ref Range Status   03/28/2022 >60.0 >60 mL/min/1.73 m^2 Final     Comment:     Calculation used to obtain the estimated glomerular filtration  rate (eGFR) is the CKD-EPI equation.        Lab Results   Component Value Date    WBC 6.97 03/15/2023    HGB 13.5 03/15/2023    HCT 42.0 03/15/2023    MCV 79 (L) 03/15/2023     03/15/2023     Lab Results   Component Value  Date    CHOL 170 03/28/2022     Lab Results   Component Value Date    HDL 41 03/28/2022     Lab Results   Component Value Date    LDLCALC 115.2 03/28/2022     Lab Results   Component Value Date    TRIG 69 03/28/2022     Lab Results   Component Value Date    CHOLHDL 24.1 03/28/2022     Lab Results   Component Value Date    TSH 0.586 03/15/2023    W1TQNIA 76 03/15/2023       ASSESSMENT/PLAN     Desire Kilpatrick is a 44 y.o. female     Desire was seen today for flu like illness.    Diagnoses and all orders for this visit:    Flu-like symptoms  -     POCT Influenza A/B Molecular Negative   -     POCT COVID-19 Rapid Screening Negative   -     POCT Rapid Strep A Negative   -     diphenhydrAMINE-aluminum-magnesium hydroxide-simethicone-LIDOcaine viscous HCl 2%; Swish and spit 15 mLs every 4 (four) hours as needed (sore throat).  -     azelastine (ASTELIN) 137 mcg (0.1 %) nasal spray; 1 spray (137 mcg total) by Nasal route 2 (two) times daily.  -     benzonatate (TESSALON) 100 MG capsule; Take 1 capsule (100 mg total) by mouth 3 (three) times daily as needed for Cough.    Acute viral syndrome  -     diphenhydrAMINE-aluminum-magnesium hydroxide-simethicone-LIDOcaine viscous HCl 2%; Swish and spit 15 mLs every 4 (four) hours as needed (sore throat).  -     azelastine (ASTELIN) 137 mcg (0.1 %) nasal spray; 1 spray (137 mcg total) by Nasal route 2 (two) times daily.  -     benzonatate (TESSALON) 100 MG capsule; Take 1 capsule (100 mg total) by mouth 3 (three) times daily as needed for Cough.    Pharyngitis, unspecified etiology  -     diphenhydrAMINE-aluminum-magnesium hydroxide-simethicone-LIDOcaine viscous HCl 2%; Swish and spit 15 mLs every 4 (four) hours as needed (sore throat).  -     azelastine (ASTELIN) 137 mcg (0.1 %) nasal spray; 1 spray (137 mcg total) by Nasal route 2 (two) times daily.  -     benzonatate (TESSALON) 100 MG capsule; Take 1 capsule (100 mg total) by mouth 3 (three) times daily as needed for  Cough.             Patient was counseled on when and how to seek emergent care.       Yuli Dodson PA-C   Department of Internal Medicine - [facility]   11:35 AM

## 2024-11-08 NOTE — LETTER
November 8, 2024      [facility]  [address]  [facility] [address]  Phone: [phone]  Fax: [phone]       Patient: Desire Kilpatrick   YOB: 1980  Date of Visit: 11/08/2024    To Whom It May Concern:    Cody Kilpatrick  was at [facility] on 11/08/2024. The patient may return to work and/or school on 11/13/2024 with no restrictions. If you have any questions or concerns, or if I can be of further assistance, please do not hesitate to contact me.    Sincerely,        Yuli Dodson PA-C

## 2024-11-12 ENCOUNTER — PATIENT MESSAGE (OUTPATIENT)
Dept: INTERNAL MEDICINE | Facility: CLINIC | Age: 44
End: 2024-11-12
Payer: COMMERCIAL

## 2024-11-12 NOTE — TELEPHONE ENCOUNTER
Pt needs follow-up apt for worsening symptoms. She may need repeat Flu and COVID testing since she came only 24 hours after symptoms started. Please offer her first available in person apt with her PCP's care team. Thanks LINDSAY

## 2024-11-19 ENCOUNTER — TELEPHONE (OUTPATIENT)
Dept: SURGERY | Facility: CLINIC | Age: 44
End: 2024-11-19
Payer: COMMERCIAL

## 2024-12-30 ENCOUNTER — OFFICE VISIT (OUTPATIENT)
Dept: OBSTETRICS AND GYNECOLOGY | Facility: CLINIC | Age: 44
End: 2024-12-30
Payer: COMMERCIAL

## 2024-12-30 ENCOUNTER — LAB VISIT (OUTPATIENT)
Dept: LAB | Facility: HOSPITAL | Age: 44
End: 2024-12-30
Attending: OBSTETRICS & GYNECOLOGY
Payer: COMMERCIAL

## 2024-12-30 VITALS
BODY MASS INDEX: 28.39 KG/M2 | WEIGHT: 192.25 LBS | SYSTOLIC BLOOD PRESSURE: 125 MMHG | DIASTOLIC BLOOD PRESSURE: 89 MMHG

## 2024-12-30 DIAGNOSIS — Z11.3 SCREENING EXAMINATION FOR STD (SEXUALLY TRANSMITTED DISEASE): ICD-10-CM

## 2024-12-30 DIAGNOSIS — Z01.419 ROUTINE GYNECOLOGICAL EXAMINATION: Primary | ICD-10-CM

## 2024-12-30 DIAGNOSIS — R30.0 DYSURIA: ICD-10-CM

## 2024-12-30 DIAGNOSIS — Z12.31 VISIT FOR SCREENING MAMMOGRAM: ICD-10-CM

## 2024-12-30 DIAGNOSIS — Z01.419 ROUTINE GYNECOLOGICAL EXAMINATION: ICD-10-CM

## 2024-12-30 LAB
HIV 1+2 AB+HIV1 P24 AG SERPL QL IA: NORMAL
TREPONEMA PALLIDUM IGG+IGM AB [PRESENCE] IN SERUM OR PLASMA BY IMMUNOASSAY: NONREACTIVE

## 2024-12-30 PROCEDURE — 99999 PR PBB SHADOW E&M-EST. PATIENT-LVL III: CPT | Mod: PBBFAC,,, | Performed by: OBSTETRICS & GYNECOLOGY

## 2024-12-30 PROCEDURE — 36415 COLL VENOUS BLD VENIPUNCTURE: CPT | Performed by: OBSTETRICS & GYNECOLOGY

## 2024-12-30 PROCEDURE — 99396 PREV VISIT EST AGE 40-64: CPT | Mod: S$GLB,,, | Performed by: OBSTETRICS & GYNECOLOGY

## 2024-12-30 PROCEDURE — 87389 HIV-1 AG W/HIV-1&-2 AB AG IA: CPT | Performed by: OBSTETRICS & GYNECOLOGY

## 2024-12-30 PROCEDURE — 3079F DIAST BP 80-89 MM HG: CPT | Mod: CPTII,S$GLB,, | Performed by: OBSTETRICS & GYNECOLOGY

## 2024-12-30 PROCEDURE — 3074F SYST BP LT 130 MM HG: CPT | Mod: CPTII,S$GLB,, | Performed by: OBSTETRICS & GYNECOLOGY

## 2024-12-30 PROCEDURE — 86593 SYPHILIS TEST NON-TREP QUANT: CPT | Performed by: OBSTETRICS & GYNECOLOGY

## 2024-12-30 PROCEDURE — 3008F BODY MASS INDEX DOCD: CPT | Mod: CPTII,S$GLB,, | Performed by: OBSTETRICS & GYNECOLOGY

## 2024-12-30 PROCEDURE — 87491 CHLMYD TRACH DNA AMP PROBE: CPT | Performed by: OBSTETRICS & GYNECOLOGY

## 2024-12-30 PROCEDURE — 1159F MED LIST DOCD IN RCRD: CPT | Mod: CPTII,S$GLB,, | Performed by: OBSTETRICS & GYNECOLOGY

## 2024-12-30 PROCEDURE — 87086 URINE CULTURE/COLONY COUNT: CPT | Performed by: OBSTETRICS & GYNECOLOGY

## 2024-12-30 RX ORDER — CEPHALEXIN 500 MG/1
500 CAPSULE ORAL EVERY 12 HOURS
Qty: 20 CAPSULE | Refills: 12 | Status: SHIPPED | OUTPATIENT
Start: 2024-12-30 | End: 2025-01-09

## 2024-12-30 NOTE — PROGRESS NOTES
43 yo female s/p HYST in 2021 for irregular menstrual cycles who presents for routine gyn visit.  No gyn complaints today.  Reports h/o ovarian cysts that can cause really bad pain.  At these times, will use depo provera to help with resolution of ovarian cyst.  No pain today.  Has h/o recurrent urine infections. Urine cx sent today rx for keflex sent with refills prn.  Ok with std testing  No issues with her breasts today.    ROS:  GENERAL: Denies weight gain or weight loss. Feeling well overall.   SKIN: Denies rash or lesions.     CHEST: Denies chest pain or shortness of breath.   CARDIOVASCULAR: Denies palpitations or left sided chest pain.   ABDOMEN: No abdominal pain, constipation, diarrhea, nausea, vomiting or rectal bleeding.   URINARY: No frequency, dysuria, hematuria, or burning on urination. But see HPI  REPRODUCTIVE: no complaints  BREASTS: denies pain, lumps, or nipple discharge.   HEMATOLOGIC: No easy bruisability or excessive bleeding.   MUSCULOSKELETAL: Denies joint pain or swelling.   NEUROLOGIC: Denies syncope or weakness.   PSYCHIATRIC: Denies depression, anxiety or mood swings.         PE:   Vitals: /89   Wt 87.2 kg (192 lb 3.9 oz)   LMP 06/07/2021 (Approximate)   BMI 28.39 kg/m²   APPEARANCE: Well nourished, well developed, in no acute distress.  SKIN: Normal skin turgor, no lesions  ABDOMEN: Soft. No tenderness or masses. No hepatosplenomegaly. No hernias.  BREASTS: Symmetrical, no skin changes or visible lesions. No palpable masses, nipple discharge or adenopathy bilaterally.  PELVIC: Normal external female genitalia without lesions. Normal hair distribution. Adequate perineal body, normal urethral meatus. Vagina moist and well rugated without lesions or discharge. Uterus/cervix surgically absent; No significant cystocele or rectocele. Bimanual exam showed vaginal cuff intact, nontender. Adnexa without masses or tenderness. Urethra and bladder normal.        AP  Routine gyn  -s/p  normal breast exam: mammogram uptodate  -s/p normal pelvic exam:   -Pap not indicated  -STD testing: gc/chl, hiv, rpr ordered  -recurrent UTI: urine cx sent, rx for keflex sent     JARAD Contreras MD

## 2024-12-31 LAB
BACTERIA UR CULT: NO GROWTH
C TRACH DNA SPEC QL NAA+PROBE: NOT DETECTED
N GONORRHOEA DNA SPEC QL NAA+PROBE: NOT DETECTED

## 2025-02-23 NOTE — PROGRESS NOTES
Cardiology Clinic Visit    History of Present Illness:       Desire Kilpatrick is a pleasant 44 y.o. female with medical issues noted below in assessment/plan    2/24/25 2/24/25  Self referred for symptoms of skipped beats. First told years ago she may have MVP.  When she sleeps, every  night, she lays down she feels heart pusle/racing. No sx during the day. Denies Chest Discomfort/ROSS/SOB//Syncope.  Also having shoulder pain b/l, worse on ri ght side, for several weeks, improved stretching, worsened with weighs.   Has been weight lifiting for a long time with HIT training. No issues with exertion.   No major family hx of CVD. No smoking.   Recently started baby asa, off amlo 10--hombe /80s-enrolled in digital HTN  Recently lost 30 lbs    History obtained by patient interview and supplemented by nursing documentation and chart review.   Objective   PMHx:  has a past medical history of Migraine headache, Mitral valve prolapse (2012), and PONV (postoperative nausea and vomiting).   SurgHx:  has a past surgical history that includes Tubal ligation; tummy tuck; Breast Reduction; Arthroscopy of knee (Left, 08/24/2018); Arthroscopic chondroplasty of knee joint (Left, 08/24/2018); Lateral retinacula release of knee (Left, 08/24/2018); Robot-assisted laparoscopic hysterectomy (N/A, 06/23/2021); Cystoscopy (N/A, 06/23/2021); Salpingectomy (Bilateral, 06/23/2021); Total Reduction Mammoplasty; Hysterectomy; Colonoscopy (N/A, 06/01/2022); Breast surgery (2010); and Cosmetic surgery (Tummy tuck).   FamHx: family history includes Breast cancer in her maternal aunt, maternal aunt, and maternal aunt; Breast cancer (age of onset: 68) in her mother; Cancer in her paternal grandmother; Hypertension in her brother and mother; Lupus in her maternal grandmother; Stomach cancer in her maternal aunt.   SocialHx:  reports that she has never smoked. She has never used smokeless tobacco. She reports current alcohol use of about  "6.0 standard drinks of alcohol per week. She reports that she does not currently use drugs after having used the following drugs: Marijuana.  Home Meds:  Current Outpatient Medications   Medication Instructions    amLODIPine (NORVASC) 10 mg, Oral, Daily    aspirin 81 mg, Daily     Objective/Exam:   BP (!) 165/93 (BP Location: Right arm)   Pulse 81   Ht 5' 9" (1.753 m)   Wt 87.1 kg (192 lb 2.1 oz)   LMP 06/07/2021 (Approximate)   SpO2 100%   BMI 28.37 kg/m²    Wt Readings from Last 4 Encounters:   02/24/25 87.1 kg (192 lb 2.1 oz)   12/30/24 87.2 kg (192 lb 3.9 oz)   11/08/24 84.8 kg (186 lb 15.2 oz)   08/21/24 93.3 kg (205 lb 11 oz)      Constitutional: NAD, Atraumatic, Conversant   HEENT: MMM, Sclera anicteric, No JVD   Cardiovascular: RRR, no murmurs noted, no chest tenderness to palpation, 2+ radial pulses b/l  Pulmonary: normal respiratory effort, CTAB, no crackles, wheezes  Abdominal: S/NT/ND  Musculoskeletal: No lower extremity edema noted b/l  Neurological: No gross neurological deficits  Skin: W/D/I  Psych: Appropriate affect, normal mood  Labs/Imaging/Procedures   Personally reviewed  Lab Results   Component Value Date     05/08/2024    K 3.8 05/08/2024     05/08/2024    CO2 24 05/08/2024    BUN 13 05/08/2024    CREATININE 1.0 05/08/2024    GLUCOSE 94 09/30/2008    ANIONGAP 10 05/08/2024     Lab Results   Component Value Date    HGBA1C 5.5 03/28/2022     No results found for: "BNP", "BNPTRIAGEBLO"   Lab Results   Component Value Date    WBC 6.97 03/15/2023    HGB 13.5 03/15/2023    HCT 42.0 03/15/2023    HCT 41 02/16/2023     03/15/2023    GRAN 4.0 03/15/2023    GRAN 57.2 03/15/2023     Lab Results   Component Value Date    CHOL 170 03/28/2022    HDL 41 03/28/2022    LDLCALC 115.2 03/28/2022    TRIG 69 03/28/2022     Lab Results   Component Value Date    TSH 0.586 03/15/2023     No results found for: "APOLIPOPROTE"  No results found for: "LIPOA"       TTE:  No results found for this " or any previous visit.    Stress Testing:   No results found for this or any previous visit.     Coronary Angiogram:  No results found for this or any previous visit.      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  Has 3 kids    Time spent on this visit included personally:  -Reviewing Medical records & lab results  -Independently reviewing and interpreting (if not documented by myself) EKGs, echocardiograms, catherizations   -Obtaining a history, performing a relevant exam, counseling/educating the patient/family  -Documenting clinical information in the EMR including ordering of tests  -Care coordination and communicating with other health care providers       Problem List:     1. Primary hypertension    2. Palpitations      Assessment/Plan:   Desire Kilpatrick is a 44 y.o. female with HTN (enrolled in digital htn), previous obesity now lost 30lb in last year p/w palpitations per above and HTN. Suspect some sensation (especially nights) is position awareness of apical impluse more noticeable after weight loss. Will r/o structural issues in setting of history of being told she has MVP as well as arrhythmias.    -Holter   -TTE   -can continue to hold amlodipine and monitor blood pressures at home now that she is exercising regularly and has lost over 30 lb in the last year.    -continue additional hypertension management   -self started aspirin however may can discontinue in the future after above tests       Thank you for the opportunity to care for this patient. Please don't hesitate to reach out with any questions/concerns.   Speech recognition software used for parts of this note. Please excuse grammar, out of context phrases, and/or syntax issues.        Indy Chung MD  Interventional Cardiology  [facility]  [place]

## 2025-02-24 ENCOUNTER — OFFICE VISIT (OUTPATIENT)
Dept: CARDIOLOGY | Facility: CLINIC | Age: 45
End: 2025-02-24
Payer: COMMERCIAL

## 2025-02-24 VITALS
SYSTOLIC BLOOD PRESSURE: 165 MMHG | HEIGHT: 69 IN | DIASTOLIC BLOOD PRESSURE: 93 MMHG | HEART RATE: 81 BPM | BODY MASS INDEX: 28.46 KG/M2 | WEIGHT: 192.13 LBS | OXYGEN SATURATION: 100 %

## 2025-02-24 DIAGNOSIS — I10 PRIMARY HYPERTENSION: Primary | ICD-10-CM

## 2025-02-24 DIAGNOSIS — R00.2 PALPITATIONS: ICD-10-CM

## 2025-02-24 LAB
OHS QRS DURATION: 94 MS
OHS QTC CALCULATION: 416 MS

## 2025-02-24 PROCEDURE — 3008F BODY MASS INDEX DOCD: CPT | Mod: CPTII,S$GLB,, | Performed by: INTERNAL MEDICINE

## 2025-02-24 PROCEDURE — 3079F DIAST BP 80-89 MM HG: CPT | Mod: CPTII,S$GLB,, | Performed by: INTERNAL MEDICINE

## 2025-02-24 PROCEDURE — 1159F MED LIST DOCD IN RCRD: CPT | Mod: CPTII,S$GLB,, | Performed by: INTERNAL MEDICINE

## 2025-02-24 PROCEDURE — 3077F SYST BP >= 140 MM HG: CPT | Mod: CPTII,S$GLB,, | Performed by: INTERNAL MEDICINE

## 2025-02-24 PROCEDURE — 99204 OFFICE O/P NEW MOD 45 MIN: CPT | Mod: S$GLB,,, | Performed by: INTERNAL MEDICINE

## 2025-02-24 PROCEDURE — 99999 PR PBB SHADOW E&M-EST. PATIENT-LVL III: CPT | Mod: PBBFAC,,, | Performed by: INTERNAL MEDICINE

## 2025-02-24 PROCEDURE — 93000 ELECTROCARDIOGRAM COMPLETE: CPT | Mod: S$GLB,,, | Performed by: STUDENT IN AN ORGANIZED HEALTH CARE EDUCATION/TRAINING PROGRAM

## 2025-02-24 RX ORDER — NAPROXEN SODIUM 220 MG/1
81 TABLET, FILM COATED ORAL DAILY
COMMUNITY

## 2025-02-25 ENCOUNTER — LAB VISIT (OUTPATIENT)
Dept: LAB | Facility: HOSPITAL | Age: 45
End: 2025-02-25
Attending: INTERNAL MEDICINE
Payer: COMMERCIAL

## 2025-02-25 DIAGNOSIS — I10 PRIMARY HYPERTENSION: ICD-10-CM

## 2025-02-25 DIAGNOSIS — R00.2 PALPITATIONS: ICD-10-CM

## 2025-02-25 LAB
ALBUMIN SERPL BCP-MCNC: 3.7 G/DL (ref 3.5–5.2)
ALP SERPL-CCNC: 47 U/L (ref 40–150)
ALT SERPL W/O P-5'-P-CCNC: 17 U/L (ref 10–44)
ANION GAP SERPL CALC-SCNC: 7 MMOL/L (ref 8–16)
AST SERPL-CCNC: 19 U/L (ref 10–40)
BASOPHILS # BLD AUTO: 0.03 K/UL (ref 0–0.2)
BASOPHILS NFR BLD: 0.6 % (ref 0–1.9)
BILIRUB SERPL-MCNC: 0.6 MG/DL (ref 0.1–1)
BUN SERPL-MCNC: 7 MG/DL (ref 6–20)
CALCIUM SERPL-MCNC: 8.7 MG/DL (ref 8.7–10.5)
CHLORIDE SERPL-SCNC: 111 MMOL/L (ref 95–110)
CHOLEST SERPL-MCNC: 153 MG/DL (ref 120–199)
CHOLEST/HDLC SERPL: 3.4 {RATIO} (ref 2–5)
CO2 SERPL-SCNC: 22 MMOL/L (ref 23–29)
CREAT SERPL-MCNC: 0.7 MG/DL (ref 0.5–1.4)
DIFFERENTIAL METHOD BLD: ABNORMAL
EOSINOPHIL # BLD AUTO: 0.1 K/UL (ref 0–0.5)
EOSINOPHIL NFR BLD: 1.3 % (ref 0–8)
ERYTHROCYTE [DISTWIDTH] IN BLOOD BY AUTOMATED COUNT: 13.5 % (ref 11.5–14.5)
EST. GFR  (NO RACE VARIABLE): >60 ML/MIN/1.73 M^2
GLUCOSE SERPL-MCNC: 91 MG/DL (ref 70–110)
HCT VFR BLD AUTO: 36.5 % (ref 37–48.5)
HDLC SERPL-MCNC: 45 MG/DL (ref 40–75)
HDLC SERPL: 29.4 % (ref 20–50)
HGB BLD-MCNC: 11.9 G/DL (ref 12–16)
IMM GRANULOCYTES # BLD AUTO: 0.02 K/UL (ref 0–0.04)
IMM GRANULOCYTES NFR BLD AUTO: 0.4 % (ref 0–0.5)
LDLC SERPL CALC-MCNC: 99.8 MG/DL (ref 63–159)
LYMPHOCYTES # BLD AUTO: 1.9 K/UL (ref 1–4.8)
LYMPHOCYTES NFR BLD: 36.3 % (ref 18–48)
MCH RBC QN AUTO: 25.7 PG (ref 27–31)
MCHC RBC AUTO-ENTMCNC: 32.6 G/DL (ref 32–36)
MCV RBC AUTO: 79 FL (ref 82–98)
MONOCYTES # BLD AUTO: 0.4 K/UL (ref 0.3–1)
MONOCYTES NFR BLD: 6.8 % (ref 4–15)
NEUTROPHILS # BLD AUTO: 2.9 K/UL (ref 1.8–7.7)
NEUTROPHILS NFR BLD: 54.6 % (ref 38–73)
NONHDLC SERPL-MCNC: 108 MG/DL
NRBC BLD-RTO: 0 /100 WBC
PLATELET # BLD AUTO: 187 K/UL (ref 150–450)
PMV BLD AUTO: 10.5 FL (ref 9.2–12.9)
POTASSIUM SERPL-SCNC: 3.8 MMOL/L (ref 3.5–5.1)
PROT SERPL-MCNC: 7.2 G/DL (ref 6–8.4)
RBC # BLD AUTO: 4.63 M/UL (ref 4–5.4)
SODIUM SERPL-SCNC: 140 MMOL/L (ref 136–145)
T4 FREE SERPL-MCNC: 1.16 NG/DL (ref 0.71–1.51)
TRIGL SERPL-MCNC: 41 MG/DL (ref 30–150)
TSH SERPL DL<=0.005 MIU/L-ACNC: 0.06 UIU/ML (ref 0.4–4)
WBC # BLD AUTO: 5.29 K/UL (ref 3.9–12.7)

## 2025-02-25 PROCEDURE — 84443 ASSAY THYROID STIM HORMONE: CPT | Performed by: INTERNAL MEDICINE

## 2025-02-25 PROCEDURE — 80053 COMPREHEN METABOLIC PANEL: CPT | Performed by: INTERNAL MEDICINE

## 2025-02-25 PROCEDURE — 84439 ASSAY OF FREE THYROXINE: CPT | Performed by: INTERNAL MEDICINE

## 2025-02-25 PROCEDURE — 36415 COLL VENOUS BLD VENIPUNCTURE: CPT | Performed by: INTERNAL MEDICINE

## 2025-02-25 PROCEDURE — 85025 COMPLETE CBC W/AUTO DIFF WBC: CPT | Performed by: INTERNAL MEDICINE

## 2025-02-25 PROCEDURE — 80061 LIPID PANEL: CPT | Performed by: INTERNAL MEDICINE

## 2025-03-11 ENCOUNTER — RESULTS FOLLOW-UP (OUTPATIENT)
Dept: CARDIOLOGY | Facility: CLINIC | Age: 45
End: 2025-03-11

## 2025-03-11 ENCOUNTER — TELEPHONE (OUTPATIENT)
Dept: CARDIOLOGY | Facility: CLINIC | Age: 45
End: 2025-03-11
Payer: COMMERCIAL

## 2025-03-11 NOTE — TELEPHONE ENCOUNTER
Called patient   To discuss the results of recent blood work.  LDL within goal at this point.  Can stop aspirin 81 mg daily .  Thyroid function to seem to indicate some subclinical  hyperthyroidism.  I will place a referral for her to see her primary care doctor to discuss further workup.

## 2025-03-21 ENCOUNTER — HOSPITAL ENCOUNTER (OUTPATIENT)
Dept: RADIOLOGY | Facility: HOSPITAL | Age: 45
Discharge: HOME | End: 2025-03-21
Attending: OBSTETRICS & GYNECOLOGY
Payer: COMMERCIAL

## 2025-03-21 DIAGNOSIS — Z12.31 VISIT FOR SCREENING MAMMOGRAM: ICD-10-CM

## 2025-03-21 PROCEDURE — 77063 BREAST TOMOSYNTHESIS BI: CPT | Mod: 26,,, | Performed by: RADIOLOGY

## 2025-03-21 PROCEDURE — 77067 SCR MAMMO BI INCL CAD: CPT | Mod: 26,,, | Performed by: RADIOLOGY

## 2025-03-21 PROCEDURE — 77063 BREAST TOMOSYNTHESIS BI: CPT | Mod: TC

## 2025-03-26 ENCOUNTER — RESULTS FOLLOW-UP (OUTPATIENT)
Dept: OBSTETRICS AND GYNECOLOGY | Facility: CLINIC | Age: 45
End: 2025-03-26

## 2025-03-27 ENCOUNTER — LAB VISIT (OUTPATIENT)
Dept: LAB | Facility: HOSPITAL | Age: 45
End: 2025-03-27
Attending: STUDENT IN AN ORGANIZED HEALTH CARE EDUCATION/TRAINING PROGRAM
Payer: COMMERCIAL

## 2025-03-27 ENCOUNTER — OFFICE VISIT (OUTPATIENT)
Dept: PRIMARY CARE CLINIC | Facility: CLINIC | Age: 45
End: 2025-03-27
Payer: COMMERCIAL

## 2025-03-27 VITALS
DIASTOLIC BLOOD PRESSURE: 82 MMHG | WEIGHT: 189.38 LBS | OXYGEN SATURATION: 96 % | BODY MASS INDEX: 27.97 KG/M2 | SYSTOLIC BLOOD PRESSURE: 118 MMHG | HEART RATE: 64 BPM

## 2025-03-27 DIAGNOSIS — R68.89 HEAT INTOLERANCE: ICD-10-CM

## 2025-03-27 DIAGNOSIS — R79.89 LOW TSH LEVEL: ICD-10-CM

## 2025-03-27 DIAGNOSIS — Z13.1 SCREENING FOR DIABETES MELLITUS: ICD-10-CM

## 2025-03-27 DIAGNOSIS — Z00.00 ANNUAL PHYSICAL EXAM: Primary | ICD-10-CM

## 2025-03-27 DIAGNOSIS — Z00.00 ANNUAL PHYSICAL EXAM: ICD-10-CM

## 2025-03-27 LAB
ABSOLUTE EOSINOPHIL (OHS): 0.09 K/UL
ABSOLUTE MONOCYTE (OHS): 0.29 K/UL (ref 0.3–1)
ABSOLUTE NEUTROPHIL COUNT (OHS): 2.15 K/UL (ref 1.8–7.7)
ALBUMIN SERPL BCP-MCNC: 3.8 G/DL (ref 3.5–5.2)
ALP SERPL-CCNC: 47 UNIT/L (ref 40–150)
ALT SERPL W/O P-5'-P-CCNC: 16 UNIT/L (ref 10–44)
ANION GAP (OHS): 8 MMOL/L (ref 8–16)
AST SERPL-CCNC: 26 UNIT/L (ref 11–45)
BASOPHILS # BLD AUTO: 0.07 K/UL
BASOPHILS NFR BLD AUTO: 1.5 %
BILIRUB SERPL-MCNC: 0.7 MG/DL (ref 0.1–1)
BUN SERPL-MCNC: 9 MG/DL (ref 6–20)
CALCIUM SERPL-MCNC: 9.1 MG/DL (ref 8.7–10.5)
CHLORIDE SERPL-SCNC: 107 MMOL/L (ref 95–110)
CHOLEST SERPL-MCNC: 206 MG/DL (ref 120–199)
CHOLEST/HDLC SERPL: 3.7 {RATIO} (ref 2–5)
CO2 SERPL-SCNC: 24 MMOL/L (ref 23–29)
CREAT SERPL-MCNC: 0.8 MG/DL (ref 0.5–1.4)
EAG (OHS): 103 MG/DL (ref 68–131)
ERYTHROCYTE [DISTWIDTH] IN BLOOD BY AUTOMATED COUNT: 13.5 % (ref 11.5–14.5)
FSH SERPL-ACNC: 30.23 MIU/ML
GFR SERPLBLD CREATININE-BSD FMLA CKD-EPI: >60 ML/MIN/1.73/M2
GLUCOSE SERPL-MCNC: 84 MG/DL (ref 70–110)
HBA1C MFR BLD: 5.2 % (ref 4–5.6)
HCT VFR BLD AUTO: 41 % (ref 37–48.5)
HDLC SERPL-MCNC: 56 MG/DL (ref 40–75)
HDLC SERPL: 27.2 % (ref 20–50)
HGB BLD-MCNC: 13.1 GM/DL (ref 12–16)
IMM GRANULOCYTES # BLD AUTO: 0.01 K/UL (ref 0–0.04)
IMM GRANULOCYTES NFR BLD AUTO: 0.2 % (ref 0–0.5)
LDLC SERPL CALC-MCNC: 139 MG/DL (ref 63–159)
LYMPHOCYTES # BLD AUTO: 2.02 K/UL (ref 1–4.8)
MCH RBC QN AUTO: 25.3 PG (ref 27–50)
MCHC RBC AUTO-ENTMCNC: 32 G/DL (ref 32–36)
MCV RBC AUTO: 79 FL (ref 82–98)
NONHDLC SERPL-MCNC: 150 MG/DL
NUCLEATED RBC (/100WBC) (OHS): 0 /100 WBC
PLATELET # BLD AUTO: 231 K/UL (ref 150–450)
PMV BLD AUTO: 10.8 FL (ref 9.2–12.9)
POTASSIUM SERPL-SCNC: 3.9 MMOL/L (ref 3.5–5.1)
PROT SERPL-MCNC: 7.5 GM/DL (ref 6–8.4)
RBC # BLD AUTO: 5.17 M/UL (ref 4–5.4)
RELATIVE EOSINOPHIL (OHS): 1.9 %
RELATIVE LYMPHOCYTE (OHS): 43.6 % (ref 18–48)
RELATIVE MONOCYTE (OHS): 6.3 % (ref 4–15)
RELATIVE NEUTROPHIL (OHS): 46.5 % (ref 38–73)
SODIUM SERPL-SCNC: 139 MMOL/L (ref 136–145)
T4 FREE SERPL-MCNC: 1 NG/DL (ref 0.71–1.51)
THYROPEROXIDASE IGG SERPL-ACNC: <6 IU/ML
TRIGL SERPL-MCNC: 55 MG/DL (ref 30–150)
TSH SERPL-ACNC: 1.01 UIU/ML (ref 0.4–4)
WBC # BLD AUTO: 4.63 K/UL (ref 3.9–12.7)

## 2025-03-27 PROCEDURE — 84443 ASSAY THYROID STIM HORMONE: CPT

## 2025-03-27 PROCEDURE — 80061 LIPID PANEL: CPT

## 2025-03-27 PROCEDURE — 86376 MICROSOMAL ANTIBODY EACH: CPT

## 2025-03-27 PROCEDURE — 36415 COLL VENOUS BLD VENIPUNCTURE: CPT

## 2025-03-27 PROCEDURE — 84445 ASSAY OF TSI GLOBULIN: CPT

## 2025-03-27 PROCEDURE — 85025 COMPLETE CBC W/AUTO DIFF WBC: CPT

## 2025-03-27 PROCEDURE — 83001 ASSAY OF GONADOTROPIN (FSH): CPT

## 2025-03-27 PROCEDURE — 83036 HEMOGLOBIN GLYCOSYLATED A1C: CPT

## 2025-03-27 PROCEDURE — 80053 COMPREHEN METABOLIC PANEL: CPT

## 2025-03-27 PROCEDURE — 84439 ASSAY OF FREE THYROXINE: CPT

## 2025-03-27 PROCEDURE — 83520 IMMUNOASSAY QUANT NOS NONAB: CPT

## 2025-03-27 PROCEDURE — 99999 PR PBB SHADOW E&M-EST. PATIENT-LVL III: CPT | Mod: PBBFAC,,, | Performed by: STUDENT IN AN ORGANIZED HEALTH CARE EDUCATION/TRAINING PROGRAM

## 2025-03-27 NOTE — PROGRESS NOTES
"SUBJECTIVE     Chief Complaint   Patient presents with    Annual Exam       HPI  Desire Kilpatrick is a 44 y.o. female with medical diagnoses as listed in the medical history and problem list that presents for annual exam.    Pt is not UTD on age appropriate CA screening.    Family, social, surgical Hx reviewed     HTN -   Currently prescribed amlodipine 10 mg daily.  Patient endorses taking medication as directed.  Denies side effects or concerns while taking medication.  Patient not currently checking BP at home.  Denies headaches, vision changes, CP, palpitations, or other concerning symptoms.  No results found for: "MICALBCREAT"  BP Readings from Last 3 Encounters:   03/27/25 118/82   02/24/25 (!) 165/93   12/30/24 125/89       Health Maintenance         Date Due Completion Date    Pneumococcal Vaccines (Age 0-49) (1 of 2 - PCV) Never done ---    Influenza Vaccine (1) 09/01/2024 10/13/2022    COVID-19 Vaccine (3 - 2024-25 season) 09/01/2024 3/11/2021    Hemoglobin A1c (Diabetic Prevention Screening) 03/28/2025 3/28/2022    Mammogram 03/21/2026 3/21/2025    TETANUS VACCINE 03/28/2032 3/28/2022    RSV Vaccine (Age 60+ and Pregnant patients) (1 - 1-dose 75+ series) 08/11/2056 ---          Separate EM concern:    Low TSH of 0.057 on labs after recent episodes of palpitations.  + occasional heat intolerance  No unintentional weight loss      PAST MEDICAL HISTORY:  Past Medical History:   Diagnosis Date    Amenorrhea     Dyspareunia     Fibroid     Migraine headache     with aura    Mitral valve prolapse 2012    PONV (postoperative nausea and vomiting)        PAST SURGICAL HISTORY:  Past Surgical History:   Procedure Laterality Date    ARTHROSCOPIC CHONDROPLASTY OF KNEE JOINT Left 08/24/2018    Procedure: ARTHROSCOPY, KNEE, WITH CHONDROPLASTY;  Surgeon: Konrad Mckeon MD;  Location: [facility];  Service: Orthopedics;  Laterality: Left;    ARTHROSCOPY OF KNEE Left 08/24/2018    Procedure: ARTHROSCOPY, KNEE;  Surgeon: " Konrad Mckeon MD;  Location: [facility] OR;  Service: Orthopedics;  Laterality: Left;    Breast Reduction      BREAST SURGERY  2010    Breast Reduction    COLONOSCOPY N/A 06/01/2022    Procedure: COLONOSCOPY;  Surgeon: Didier Contreras MD;  Location: [facility] ENDO (4TH FLR);  Service: Endoscopy;  Laterality: N/A;  fully vaccinated-GT    COSMETIC SURGERY  Tummy tuck    CYSTOSCOPY N/A 06/23/2021    Procedure: CYSTOSCOPY;  Surgeon: Puja Hearn MD;  Location: [facility] OR;  Service: OB/GYN;  Laterality: N/A;    HYSTERECTOMY      6/2020    LATERAL RETINACULA RELEASE OF KNEE Left 08/24/2018    Procedure: RELEASE, KNEE, LATERAL RETINACULA;  Surgeon: Konrad Mckeon MD;  Location: [facility] OR;  Service: Orthopedics;  Laterality: Left;    ROBOT-ASSISTED LAPAROSCOPIC HYSTERECTOMY N/A 06/23/2021    Procedure: ROBOTIC HYSTERECTOMY;  Surgeon: Puja Hearn MD;  Location: [facility] OR;  Service: OB/GYN;  Laterality: N/A;    SALPINGECTOMY Bilateral 06/23/2021    Procedure: SALPINGECTOMY;  Surgeon: Puja Hearn MD;  Location: [facility];  Service: OB/GYN;  Laterality: Bilateral;    TOTAL REDUCTION MAMMOPLASTY      2010    TUBAL LIGATION      tummy tu         SOCIAL HISTORY:  Social History     Socioeconomic History    Marital status:    Tobacco Use    Smoking status: Never    Smokeless tobacco: Never   Substance and Sexual Activity    Alcohol use: Yes     Alcohol/week: 6.0 standard drinks of alcohol     Types: 3 Glasses of wine, 3 Drinks containing 0.5 oz of alcohol per week     Comment: occasional    Drug use: Not Currently     Types: Marijuana     Comment: During weekends 1-2/week. Quit 3 years ago.    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      Social Drivers of Health     Financial Resource Strain: Low Risk  (2/18/2025)    Overall Financial Resource Strain (CARDIA)     Difficulty of  Paying Living Expenses: Not hard at all   Food Insecurity: No Food Insecurity (2/18/2025)    Hunger Vital Sign     Worried About Running Out of Food in the Last Year: Never true     Ran Out of Food in the Last Year: Never true   Transportation Needs: No Transportation Needs (2/18/2025)    PRAPARE - Transportation     Lack of Transportation (Medical): No     Lack of Transportation (Non-Medical): No   Physical Activity: Insufficiently Active (2/18/2025)    Exercise Vital Sign     Days of Exercise per Week: 3 days     Minutes of Exercise per Session: 40 min   Stress: Stress Concern Present (2/18/2025)    [employer] - Occupational Stress Questionnaire     Feeling of Stress : Very much   Housing Stability: Low Risk  (2/18/2025)    Housing Stability Vital Sign     Unable to Pay for Housing in the Last Year: No     Number of Times Moved in the Last Year: 0     Homeless in the Last Year: No       FAMILY HISTORY:  Family History   Problem Relation Name Age of Onset    Breast cancer Mother Jana Henderson 68    Hypertension Mother Jana Henderson     Hypertension Brother      Lupus Maternal Grandmother Talya Herron     Rheum arthritis Maternal Grandmother Talya Herron     Cancer Paternal Grandmother      Breast cancer Maternal Aunt Rupinder East         Great aunt    Stomach cancer Maternal Aunt Rupinder [place]     Breast cancer Maternal Aunt          great aunt    Breast cancer Maternal Aunt          great aunt    Alcohol abuse Father Olegario Tamez     Drug abuse Father Olegario Tamez     Alcohol abuse Brother Konrad Price        ALLERGIES AND MEDICATIONS: updated and reviewed.  Review of patient's allergies indicates:   Allergen Reactions    Valsartan Hives    Hydrochlorothiazide Rash     Current Outpatient Medications   Medication Sig Dispense Refill    amLODIPine (NORVASC) 10 MG tablet Take 1 tablet (10 mg total) by mouth once daily. 90 tablet 3     No current facility-administered medications for this  visit.       ROS  Review of Systems   Constitutional:  Negative for fever and weight loss.   HENT:  Negative for hearing loss.    Eyes:  Negative for discharge.   Respiratory:  Negative for cough, shortness of breath and wheezing.    Cardiovascular:  Negative for chest pain and palpitations.   Gastrointestinal:  Negative for abdominal pain, blood in stool, constipation, diarrhea, nausea and vomiting.   Genitourinary:  Negative for dysuria and hematuria.   Musculoskeletal:  Negative for back pain, joint pain and neck pain.   Skin:  Negative for rash.   Neurological:  Negative for dizziness, weakness and headaches.   Endo/Heme/Allergies:  Negative for polydipsia.   Psychiatric/Behavioral:  Negative for depression. The patient is not nervous/anxious.            OBJECTIVE     Physical Exam  Vitals:    03/27/25 1123   BP: 118/82   Pulse: 64    Body mass index is 27.97 kg/m².  Weight: 85.9 kg (189 lb 6 oz)         Physical Exam  HENT:      Head: Normocephalic and atraumatic.      Nose: Nose normal.      Mouth/Throat:      Mouth: Mucous membranes are moist.      Pharynx: Oropharynx is clear.   Eyes:      Extraocular Movements: Extraocular movements intact.      Conjunctiva/sclera: Conjunctivae normal.      Pupils: Pupils are equal, round, and reactive to light.   Cardiovascular:      Rate and Rhythm: Normal rate and regular rhythm.   Pulmonary:      Effort: Pulmonary effort is normal.      Breath sounds: Normal breath sounds.   Musculoskeletal:         General: No swelling. Normal range of motion.      Cervical back: Normal range of motion.      Right lower leg: No edema.      Left lower leg: No edema.   Skin:     General: Skin is warm.      Findings: No lesion or rash.   Neurological:      General: No focal deficit present.      Mental Status: She is alert and oriented to person, place, and time.      Motor: No weakness.   Psychiatric:         Mood and Affect: Mood normal.         Thought Content: Thought content normal.                ASSESSMENT     44 y.o. female with     1. Low TSH level    2. Heat intolerance    3. Annual physical exam    4. Screening for diabetes mellitus          PLAN:     1. Annual physical exam  -     Lipid Panel; Future; Expected date: 03/27/2025  -     Comprehensive Metabolic Panel; Future; Expected date: 03/27/2025  -     CBC Auto Differential; Future; Expected date: 03/27/2025    2. Screening for diabetes mellitus  -     Hemoglobin A1C; Future; Expected date: 03/27/2025    3. Low TSH level  -     Thyroid Stimulating Immunoglobulin; Future; Expected date: 03/27/2025  -     Thyrotropin Receptor Antibody; Future; Expected date: 03/27/2025  -     Thyroid Peroxidase Antibody; Future; Expected date: 03/27/2025  -     T4, Free; Future; Expected date: 03/27/2025  -     TSH; Future; Expected date: 03/27/2025    4. Heat intolerance  -     Follicle Stimulating Hormone; Future; Expected date: 03/27/2025  Follow up lab work. If labs abnormal, will order US thyroid.          RTC in 1 year for annual exam    Kayla Barraza MD  03/27/2025 2:35 PM

## 2025-03-29 LAB — TSH RECEP AB SER-ACNC: <1.1 IU/L (ref 0–1.75)

## 2025-03-31 ENCOUNTER — RESULTS FOLLOW-UP (OUTPATIENT)
Dept: PRIMARY CARE CLINIC | Facility: CLINIC | Age: 45
End: 2025-03-31

## 2025-04-23 ENCOUNTER — HOSPITAL ENCOUNTER (EMERGENCY)
Facility: HOSPITAL | Age: 45
Discharge: HOME | End: 2025-04-23
Attending: EMERGENCY MEDICINE
Payer: COMMERCIAL

## 2025-04-23 VITALS
DIASTOLIC BLOOD PRESSURE: 79 MMHG | SYSTOLIC BLOOD PRESSURE: 115 MMHG | TEMPERATURE: 98 F | HEART RATE: 75 BPM | BODY MASS INDEX: 27.4 KG/M2 | WEIGHT: 185 LBS | OXYGEN SATURATION: 97 % | HEIGHT: 69 IN | RESPIRATION RATE: 20 BRPM

## 2025-04-23 DIAGNOSIS — R29.818 ACUTE FOCAL NEUROLOGICAL DEFICIT: ICD-10-CM

## 2025-04-23 DIAGNOSIS — R51.9 NONINTRACTABLE HEADACHE, UNSPECIFIED CHRONICITY PATTERN, UNSPECIFIED HEADACHE TYPE: ICD-10-CM

## 2025-04-23 DIAGNOSIS — R20.0 NUMBNESS: Primary | ICD-10-CM

## 2025-04-23 PROBLEM — R20.2 PARESTHESIAS: Status: ACTIVE | Noted: 2025-04-23

## 2025-04-23 LAB
ABSOLUTE EOSINOPHIL (OHS): 0.04 K/UL
ABSOLUTE MONOCYTE (OHS): 0.44 K/UL (ref 0.3–1)
ABSOLUTE NEUTROPHIL COUNT (OHS): 6.54 K/UL (ref 1.8–7.7)
ALBUMIN SERPL BCP-MCNC: 3.9 G/DL (ref 3.5–5.2)
ALP SERPL-CCNC: 41 UNIT/L (ref 40–150)
ALT SERPL W/O P-5'-P-CCNC: 11 UNIT/L (ref 10–44)
ANION GAP (OHS): 6 MMOL/L (ref 8–16)
AST SERPL-CCNC: 21 UNIT/L (ref 11–45)
BASOPHILS # BLD AUTO: 0.02 K/UL
BASOPHILS NFR BLD AUTO: 0.3 %
BILIRUB SERPL-MCNC: 0.8 MG/DL (ref 0.1–1)
BUN SERPL-MCNC: 10 MG/DL (ref 6–20)
CALCIUM SERPL-MCNC: 9.3 MG/DL (ref 8.7–10.5)
CHLORIDE SERPL-SCNC: 106 MMOL/L (ref 95–110)
CO2 SERPL-SCNC: 23 MMOL/L (ref 23–29)
CREAT SERPL-MCNC: 0.8 MG/DL (ref 0.5–1.4)
CREAT SERPL-MCNC: 1 MG/DL (ref 0.5–1.4)
ERYTHROCYTE [DISTWIDTH] IN BLOOD BY AUTOMATED COUNT: 13.2 % (ref 11.5–14.5)
GFR SERPLBLD CREATININE-BSD FMLA CKD-EPI: >60 ML/MIN/1.73/M2
GLUCOSE SERPL-MCNC: 83 MG/DL (ref 70–110)
HCT VFR BLD AUTO: 40 % (ref 37–48.5)
HGB BLD-MCNC: 13.3 GM/DL (ref 12–16)
IMM GRANULOCYTES # BLD AUTO: 0.02 K/UL (ref 0–0.04)
IMM GRANULOCYTES NFR BLD AUTO: 0.3 % (ref 0–0.5)
INR PPP: 1 (ref 0.8–1.2)
LYMPHOCYTES # BLD AUTO: 0.9 K/UL (ref 1–4.8)
MCH RBC QN AUTO: 25.5 PG (ref 27–31)
MCHC RBC AUTO-ENTMCNC: 33.3 G/DL (ref 32–36)
MCV RBC AUTO: 77 FL (ref 82–98)
NUCLEATED RBC (/100WBC) (OHS): 0 /100 WBC
PLATELET # BLD AUTO: 226 K/UL (ref 150–450)
PMV BLD AUTO: 9.6 FL (ref 9.2–12.9)
POC PTINR: 1 (ref 0.9–1.2)
POCT GLUCOSE: 91 MG/DL (ref 70–110)
POTASSIUM SERPL-SCNC: 4.1 MMOL/L (ref 3.5–5.1)
PROT SERPL-MCNC: 7.7 GM/DL (ref 6–8.4)
PROTHROMBIN TIME: 11.1 SECONDS (ref 9–12.5)
RBC # BLD AUTO: 5.21 M/UL (ref 4–5.4)
RELATIVE EOSINOPHIL (OHS): 0.5 %
RELATIVE LYMPHOCYTE (OHS): 11.3 % (ref 18–48)
RELATIVE MONOCYTE (OHS): 5.5 % (ref 4–15)
RELATIVE NEUTROPHIL (OHS): 82.1 % (ref 38–73)
SAMPLE: NORMAL
SAMPLE: NORMAL
SODIUM SERPL-SCNC: 135 MMOL/L (ref 136–145)
WBC # BLD AUTO: 7.96 K/UL (ref 3.9–12.7)

## 2025-04-23 PROCEDURE — 63600175 PHARM REV CODE 636 W HCPCS: Performed by: EMERGENCY MEDICINE

## 2025-04-23 PROCEDURE — 99900035 HC TECH TIME PER 15 MIN (STAT)

## 2025-04-23 PROCEDURE — 93010 ELECTROCARDIOGRAM REPORT: CPT | Mod: ,,, | Performed by: INTERNAL MEDICINE

## 2025-04-23 PROCEDURE — 82565 ASSAY OF CREATININE: CPT

## 2025-04-23 PROCEDURE — 96374 THER/PROPH/DIAG INJ IV PUSH: CPT

## 2025-04-23 PROCEDURE — 25000003 PHARM REV CODE 250: Performed by: EMERGENCY MEDICINE

## 2025-04-23 PROCEDURE — 99285 EMERGENCY DEPT VISIT HI MDM: CPT | Mod: 25

## 2025-04-23 PROCEDURE — 36415 COLL VENOUS BLD VENIPUNCTURE: CPT | Performed by: EMERGENCY MEDICINE

## 2025-04-23 PROCEDURE — 85610 PROTHROMBIN TIME: CPT | Performed by: EMERGENCY MEDICINE

## 2025-04-23 PROCEDURE — 96375 TX/PRO/DX INJ NEW DRUG ADDON: CPT

## 2025-04-23 PROCEDURE — 93005 ELECTROCARDIOGRAM TRACING: CPT

## 2025-04-23 PROCEDURE — 85610 PROTHROMBIN TIME: CPT

## 2025-04-23 PROCEDURE — 82040 ASSAY OF SERUM ALBUMIN: CPT | Performed by: EMERGENCY MEDICINE

## 2025-04-23 PROCEDURE — 82962 GLUCOSE BLOOD TEST: CPT

## 2025-04-23 PROCEDURE — G0425 INPT/ED TELECONSULT30: HCPCS | Mod: GT,,, | Performed by: PSYCHIATRY & NEUROLOGY

## 2025-04-23 PROCEDURE — 85025 COMPLETE CBC W/AUTO DIFF WBC: CPT | Performed by: EMERGENCY MEDICINE

## 2025-04-23 RX ORDER — DIPHENHYDRAMINE HYDROCHLORIDE 50 MG/ML
50 INJECTION, SOLUTION INTRAMUSCULAR; INTRAVENOUS
Status: COMPLETED | OUTPATIENT
Start: 2025-04-23 | End: 2025-04-23

## 2025-04-23 RX ORDER — ACETAMINOPHEN 500 MG
1000 TABLET ORAL
Status: COMPLETED | OUTPATIENT
Start: 2025-04-23 | End: 2025-04-23

## 2025-04-23 RX ORDER — METOCLOPRAMIDE HYDROCHLORIDE 5 MG/ML
10 INJECTION INTRAMUSCULAR; INTRAVENOUS
Status: COMPLETED | OUTPATIENT
Start: 2025-04-23 | End: 2025-04-23

## 2025-04-23 RX ADMIN — ACETAMINOPHEN 1000 MG: 500 TABLET ORAL at 01:04

## 2025-04-23 RX ADMIN — DIPHENHYDRAMINE HYDROCHLORIDE 50 MG: 50 INJECTION INTRAMUSCULAR; INTRAVENOUS at 02:04

## 2025-04-23 RX ADMIN — METOCLOPRAMIDE HYDROCHLORIDE 10 MG: 10 INJECTION, SOLUTION INTRAMUSCULAR; INTRAVENOUS at 02:04

## 2025-04-23 NOTE — SUBJECTIVE & OBJECTIVE
HPI:  44 y.o. female with HTN, depression, anxiety, presents with acute onset swallowing difficulty, language/speech impairment, and R sided numbness that subsequently moved to the L side.  Symptoms developed while driving.     Images personally reviewed and interpreted:  Study: Head CT  Study Interpretation: no acute abnormality.     Assessment and plan:  Unusual pattern of symptoms as described above.  Low index of suspicion for an acute neurovascular insult. Consider MRI brain to better elucidate her symptoms.    Lytics recommendation: Thrombolytic therapy not recommended due to Mild Non-Disabling Symptoms    Thrombectomy recommendation: No; at this time symptoms not suggestive of large vessel occlusion  Placement recommendation: pending further studies

## 2025-04-23 NOTE — ED PROVIDER NOTES
Encounter Date: 4/23/2025       History     Chief Complaint   Patient presents with    Numbness     The pt presents to the ED with a complaint of R sided numbness/tingling that started approximately 20 minutes PTA with aphasia.  Dysphaiga has resolved at this time, numbness noted to R lateral rib/chest area     44-year-old female with a history of hypertension, migraine headaches, mitral valve prolapse presents with acute onset of right-sided facial numbness that moved down the right arm and right torso, stopping at the waist as well as dysphagia and difficulty speaking.  Symptoms started approximately 25 minutes ago.  Currently patient says that her speech has normalized.  The only symptoms she has now is numbness near her right waist.  She said she did have issues with strength in the right side.  That has resolved.  Patient says she had a similar episode maybe 20 years ago but it resolved very quickly before she was able to get to the ER and no diagnosis was made.  At that time the numbness would start on the right side, go down to her feet then cross over to the left and back up to her head.   No headache currently.  No fever.      Review of patient's allergies indicates:   Allergen Reactions    Valsartan Hives    Hydrochlorothiazide Rash     Past Medical History:   Diagnosis Date    Amenorrhea     Dyspareunia     Fibroid     Migraine headache     with aura    Mitral valve prolapse 2012    PONV (postoperative nausea and vomiting)      Past Surgical History:   Procedure Laterality Date    ARTHROSCOPIC CHONDROPLASTY OF KNEE JOINT Left 08/24/2018    Procedure: ARTHROSCOPY, KNEE, WITH CHONDROPLASTY;  Surgeon: Konrad Mckeon MD;  [facility]: [facility];  Service: Orthopedics;  Laterality: Left;    ARTHROSCOPY OF KNEE Left 08/24/2018    Procedure: ARTHROSCOPY, KNEE;  Surgeon: Konrad Mckeon MD;  Location: [facility] ([facility];  Service: Orthopedics;  Laterality: Left;    Breast Reduction      BREAST SURGERY  2010    Breast  Reduction    COLONOSCOPY N/A 06/01/2022    Procedure: COLONOSCOPY;  Surgeon: Didier Contreras MD;  [facility]: [facility] (4TH FLR);  Service: Endoscopy;  Laterality: N/A;  fully vaccinated-GT    COSMETIC SURGERY  Tummy tuck    CYSTOSCOPY N/A 06/23/2021    Procedure: CYSTOSCOPY;  Surgeon: Puja Hearn MD;  Location: [facility] OR;  Service: OB/GYN;  Laterality: N/A;    HYSTERECTOMY      6/2020    LATERAL RETINACULA RELEASE OF KNEE Left 08/24/2018    Procedure: RELEASE, KNEE, LATERAL RETINACULA;  Surgeon: Konrad Mckeon MD;  Location: [facility] OR;  Service: Orthopedics;  Laterality: Left;    ROBOT-ASSISTED LAPAROSCOPIC HYSTERECTOMY N/A 06/23/2021    Procedure: ROBOTIC HYSTERECTOMY;  Surgeon: Puja Hearn MD;  Location: [facility] OR;  Service: OB/GYN;  Laterality: N/A;    SALPINGECTOMY Bilateral 06/23/2021    Procedure: SALPINGECTOMY;  Surgeon: Puja Hearn MD;  Location: [facility] OR;  Service: OB/GYN;  Laterality: Bilateral;    TOTAL REDUCTION MAMMOPLASTY      2010    TUBAL LIGATION      tumemma simon       Family History   Problem Relation Name Age of Onset    Breast cancer Mother Jana Henderson 68    Hypertension Mother Jana Henderson     Hypertension Brother      Lupus Maternal Grandmother Talya Herron     Rheum arthritis Maternal Grandmother Talya Herron     Cancer Paternal Grandmother      Breast cancer Maternal Aunt Rupinder East         Great aunt    Stomach cancer Maternal Aunt Rupinder East     Breast cancer Maternal Aunt          great aunt    Breast cancer Maternal Aunt          great aunt    Alcohol abuse Father Olegario Tamez     Drug abuse Father Olegario Tamez     Alcohol abuse Brother Konrad Pirce      Social History[1]  Review of Systems   Constitutional:  Negative for fever.   HENT:  Positive for trouble swallowing.    Eyes:  Negative for pain.   Respiratory:  Negative for shortness of breath.    Cardiovascular:  Negative for chest pain.   Gastrointestinal:  Negative for abdominal pain, nausea and vomiting.   Genitourinary:   Negative for difficulty urinating.   Musculoskeletal:  Negative for back pain and neck pain.   Neurological:  Positive for speech difficulty, weakness and numbness. Negative for headaches.   Psychiatric/Behavioral:  Negative for confusion.        Physical Exam     Initial Vitals [04/23/25 1208]   BP Pulse Resp Temp SpO2   (!) 169/96 94 16 98.9 °F (37.2 °C) 100 %      MAP       --         Physical Exam    Nursing note and vitals reviewed.  HENT:   Head: Atraumatic.   Eyes: Conjunctivae and EOM are normal.   Neck:   Normal range of motion.  Cardiovascular:      Exam reveals no gallop and no friction rub.       No murmur heard.  Pulmonary/Chest: Breath sounds normal. No respiratory distress.   Abdominal: Abdomen is soft. There is no abdominal tenderness.   Musculoskeletal:         General: No edema. Normal range of motion.      Cervical back: Normal range of motion.     Neurological: She is alert and oriented to person, place, and time. She has normal strength. No cranial nerve deficit.   Mild decrease in sensation near the right   Psychiatric: She has a normal mood and affect.         ED Course   Procedures  Labs Reviewed   COMPREHENSIVE METABOLIC PANEL - Abnormal       Result Value    Sodium 135 (*)     Potassium 4.1      Chloride 106      CO2 23      Glucose 83      BUN 10      Creatinine 0.8      Calcium 9.3      Protein Total 7.7      Albumin 3.9      Bilirubin Total 0.8      ALP 41      AST 21      ALT 11      Anion Gap 6 (*)     eGFR >60     CBC WITH DIFFERENTIAL - Abnormal    WBC 7.96      RBC 5.21      HGB 13.3      HCT 40.0      MCV 77 (*)     MCH 25.5 (*)     MCHC 33.3      RDW 13.2      Platelet Count 226      MPV 9.6      Nucleated RBC 0      Neut % 82.1 (*)     Lymph % 11.3 (*)     Mono % 5.5      Eos % 0.5      Basophil % 0.3      Imm Grans % 0.3      Neut # 6.54      Lymph # 0.90 (*)     Mono # 0.44      Eos # 0.04      Baso # 0.02      Imm Grans # 0.02     PROTIME-INR - Normal    PT 11.1      INR 1.0      CBC W/ AUTO DIFFERENTIAL    Narrative:     The following orders were created for panel order CBC W/ AUTO DIFFERENTIAL.  Procedure                               Abnormality         Status                     ---------                               -----------         ------                     CBC with Differential[[id number]]       Abnormal            Final result                 Please view results for these tests on the individual orders.   POCT GLUCOSE, HAND-HELD DEVICE   POCT GLUCOSE    POCT Glucose 91     ISTAT PROCEDURE    POC PTINR 1.0      Sample VENOUS     ISTAT CREATININE    POC Creatinine 1.0      Sample VENOUS       EKG Readings: (Independently Interpreted)   Initial Reading: No STEMI. Rhythm: Normal Sinus Rhythm. Heart Rate: 83. Ectopy: No Ectopy. Conduction: Normal.     ECG Results              ECG 12 lead (In process)        Collection Time Result Time QRS Duration OHS QTC Calculation    04/23/25 12:15:23 04/23/25 14:05:59 86 453                     In process by Interface, Lab In [facility] (04/23/25 14:06:05)                   Narrative:    Test Reason : R29.818,    Vent. Rate :  83 BPM     Atrial Rate :  83 BPM     P-R Int : 148 ms          QRS Dur :  86 ms      QT Int : 386 ms       P-R-T Axes :  71 -36  69 degrees    QTcB Int : 453 ms    Normal sinus rhythm with sinus arrhythmia  Left axis deviation  Abnormal ECG  When compared with ECG of 24-Feb-2025 13:18,  The axis Shifted left    Referred By: AAAREFERRAL SELF           Confirmed By:                                   Imaging Results              CT Head Without Contrast (Final result)  Result time 04/23/25 13:33:52      Final result by Hood Ayala MD (04/23/25 13:33:52)                   Impression:      No evidence of acute intracranial hemorrhage or parenchymal changes to indicate an acute major vascular distribution infarct.    Further assessment as clinically warranted.      Electronically signed by: Hood Ayala  MD  Date:    04/23/2025  Time:    13:33               Narrative:    EXAMINATION:  CT HEAD WITHOUT CONTRAST    CLINICAL HISTORY:  Neuro deficit, acute, stroke suspected;    TECHNIQUE:  Low dose axial CT images obtained throughout the head without the use of intravenous contrast.  Axial, sagittal and coronal reconstructions were performed.    COMPARISON:  CTA 10/09/2010    FINDINGS:  Intracranial compartment:    Ventricles and sulci are normal in size for age without evidence of hydrocephalus.    The brain parenchyma appears within normal limits.  No parenchymal  hemorrhage, edema, mass effect or major vascular distribution infarct.    No extra-axial blood or fluid collections.    Skull/extracranial contents (limited evaluation):    No displaced calvarial fracture.    The mastoid air cells and visualized paranasal sinuses are essentially clear.                                       Medications   acetaminophen tablet 1,000 mg (1,000 mg Oral Given 4/23/25 1322)   diphenhydrAMINE injection 50 mg (50 mg Intravenous Given 4/23/25 1444)   metoclopramide injection 10 mg (10 mg Intravenous Given 4/23/25 1450)     Medical Decision Making  44-year-old female with a history of hypertension presenting with acute onset of numbness, weakness and difficulty speaking.  Symptoms atypical for acute stroke and symptoms resolving.  Still has some numbness near the right hip.  Will still resume with neurological workup and discuss with vascular Neurology given time of onset.    Amount and/or Complexity of Data Reviewed  Labs: ordered. Decision-making details documented in ED Course.  Radiology: ordered.    Risk  OTC drugs.  Prescription drug management.               ED Course as of 04/23/25 1540   Wed Apr 23, 2025   1239 POCT Glucose: 91 [SN]   1307 CBC W/ AUTO DIFFERENTIAL(!) [SN]   1320 Comprehensive metabolic panel(!) [SN]   1343 CT head unremarkable.  Vascular neurology has evaluated patient.  They have low suspicion for vascular  etiology. [SN]   1354 On re-evaluation, patient denies any numbness.  She says now she just has her typical migraine.  Low suspicion for stroke.  Doubt subarachnoid hemorrhage or meningitis.  Symptoms may be complex migraine.  Will treat her headache and reassess. [SN]   1540 On re-evaluation, patient reports resolution of headache after Reglan and Benadryl.  I think patient can be discharged home.  Plan for outpatient Neurology referral.  Return instructions given. [SN]      ED Course User Index  [SN] Taco Messina MD                           Clinical Impression:  Final diagnoses:  [R29.818] Acute focal neurological deficit  [R20.0] Numbness (Primary)  [R51.9] Nonintractable headache, unspecified chronicity pattern, unspecified headache type          ED Disposition Condition    Discharge Good          ED Prescriptions    None       Follow-up Information       Follow up With Specialties Details Why Contact Info    Neurology  Schedule an appointment as soon as possible for a visit                    [1]   Social History  Tobacco Use    Smoking status: Never    Smokeless tobacco: Never   Substance Use Topics    Alcohol use: Yes     Alcohol/week: 6.0 standard drinks of alcohol     Types: 3 Glasses of wine, 3 Drinks containing 0.5 oz of alcohol per week     Comment: occasional    Drug use: Not Currently     Types: Marijuana     Comment: During weekends 1-2/week. Quit 3 years ago.        Taco Messina MD  04/23/25 0417

## 2025-04-23 NOTE — TELEMEDICINE CONSULT
[facility]  Vascular Neurology  Comprehensive Stroke Center  TeleVascular Neurology Acute Consultation Note        Consult Information  Consults    Consulting Provider: KEENAN SOLO   Current Providers  No providers found    Patient Location:  [facility] EMERGENCY DEPARTMENT Emergency Department    Spoke hospital nurse at bedside with patient assisting consultant.  Patient information was obtained from patient, past medical records, and primary team.       Stroke Documentation  Acute Stroke Times   Last Known Normal Date: 04/23/25  Last Known Normal Time: 1150  Symptom Onset Date: 04/23/25  Symptom Onset Time: 1150  Stroke Team Called Date: 04/23/25  Stroke Team Called Time: 1227  Stroke Team Arrival Date: 04/23/25  Stroke Team Arrival Time: 1234  CT Interpretation Time: 1234  Thrombolytic Therapy Recommended: No  Thrombectomy Recommended: No    NIH Scale:  Interval: baseline  1a. Level of Consciousness: 0-->Alert, keenly responsive  1b. LOC Questions: 0-->Answers both questions correctly  1c. LOC Commands: 0-->Performs both tasks correctly  2. Best Gaze: 0-->Normal  3. Visual: 0-->No visual loss  4. Facial Palsy: 0-->Normal symmetrical movements  5a. Motor Arm, Left: 0-->No drift, limb holds 90 (or 45) degrees for full 10 secs  5b. Motor Arm, Right: 0-->No drift, limb holds 90 (or 45) degrees for full 10 secs  6a. Motor Leg, Left: 0-->No drift, leg holds 30 degree position for full 5 secs  6b. Motor Leg, Right: 0-->No drift, leg holds 30 degree position for full 5 secs  7. Limb Ataxia: 0-->Absent  8. Sensory: 0-->Normal, no sensory loss  9. Best Language: 0-->No aphasia, normal  10. Dysarthria: 0-->Normal  11. Extinction and Inattention (formerly Neglect): 0-->No abnormality  Total (NIH Stroke Scale): 0      Modified Harney: Score: 0  [place] Coma Scale: 15   ABCD2 Score:    [id number] Score:    HAS -BLED Score:    ICH Score:    Hunt & Ashby Classification:      Blood pressure 133/80, pulse  "82, temperature 98.9 °F (37.2 °C), temperature source Oral, resp. rate 14, height 5' 9" (1.753 m), weight 83.9 kg (185 lb), last menstrual period 06/07/2021, SpO2 100%.      In my opinion, this was a: Tier 1; VAN Stroke Assessment: Not Applicable     Medical Decision Making  HPI:  44 y.o. female with HTN, depression, anxiety, presents with acute onset swallowing difficulty, language/speech impairment, and R sided numbness that subsequently moved to the L side.  Symptoms developed while driving.     Images personally reviewed and interpreted:  Study: Head CT  Study Interpretation: no acute abnormality.     Assessment and plan:  Unusual pattern of symptoms as described above.  Low index of suspicion for an acute neurovascular insult. Consider MRI brain to better elucidate her symptoms.    Lytics recommendation: Thrombolytic therapy not recommended due to Mild Non-Disabling Symptoms    Thrombectomy recommendation: No; at this time symptoms not suggestive of large vessel occlusion  Placement recommendation: pending further studies               ROS  Physical Exam  Past Medical History:   Diagnosis Date    Amenorrhea     Dyspareunia     Fibroid     Migraine headache     with aura    Mitral valve prolapse 2012    PONV (postoperative nausea and vomiting)      Past Surgical History:   Procedure Laterality Date    ARTHROSCOPIC CHONDROPLASTY OF KNEE JOINT Left 08/24/2018    Procedure: ARTHROSCOPY, KNEE, WITH CHONDROPLASTY;  Surgeon: Konrad Mckeon MD;  Location: [facility] OR;  Service: Orthopedics;  Laterality: Left;    ARTHROSCOPY OF KNEE Left 08/24/2018    Procedure: ARTHROSCOPY, KNEE;  Surgeon: Konrad Mckeon MD;  Location: [facility] OR;  Service: Orthopedics;  Laterality: Left;    Breast Reduction      BREAST SURGERY  2010    Breast Reduction    COLONOSCOPY N/A 06/01/2022    Procedure: COLONOSCOPY;  Surgeon: Didier Contreras MD;  [facility]  Service: Endoscopy;  Laterality: N/A;  fully vaccinated-GT    " COSMETIC SURGERY  Tummy tuck    CYSTOSCOPY N/A 06/23/2021    Procedure: CYSTOSCOPY;  Surgeon: Puja Hearn MD;  Location: [facility] OR;  Service: OB/GYN;  Laterality: N/A;    HYSTERECTOMY      6/2020    LATERAL RETINACULA RELEASE OF KNEE Left 08/24/2018    Procedure: RELEASE, KNEE, LATERAL RETINACULA;  Surgeon: Konrad Mckeon MD;  Location: [facility] OR;  Service: Orthopedics;  Laterality: Left;    ROBOT-ASSISTED LAPAROSCOPIC HYSTERECTOMY N/A 06/23/2021    Procedure: ROBOTIC HYSTERECTOMY;  Surgeon: Puja Hearn MD;  Location: [facility] OR;  Service: OB/GYN;  Laterality: N/A;    SALPINGECTOMY Bilateral 06/23/2021    Procedure: SALPINGECTOMY;  Surgeon: Puja Hearn MD;  Location: [facility] OR;  Service: OB/GYN;  Laterality: Bilateral;    TOTAL REDUCTION MAMMOPLASTY      2010    TUBAL LIGATION      [facility] simon       Family History   Problem Relation Name Age of Onset    Breast cancer Mother Jana Henderson 68    Hypertension Mother Jana Henderson     Hypertension Brother      Lupus Maternal Grandmother Talya Herron     Rheum arthritis Maternal Grandmother Talya Herron     Cancer Paternal Grandmother      Breast cancer Maternal Aunt Rupinder East         Great aunt    Stomach cancer Maternal Aunt Rupinder East     Breast cancer Maternal Aunt          great aunt    Breast cancer Maternal Aunt          great aunt    Alcohol abuse Father Olegario Tamez     Drug abuse Father Olegario Tamez     Alcohol abuse Brother Konrad Price        Diagnoses  Problem Noted   Paresthesias 4/23/2025       Jaden Cash MD      Emergent/Acute neurological consultation requested by spoke provider due to critical concerns for possible cerebrovascular event that could result in permanent loss of neurologic/bodily function, severe disability or death of this patient.  Immediate/timely evaluation by a highly prepared expert is paramount for optimal outcomes  High risk for neurological deterioration if not properly diagnosed  High risk for neurological deterioration  if not treated promplty/as soon as possible  Complex diagnostic evaluation may be required (advanced imaging)  High risk treatment options (thrombolytics and/or thrombectomy)    Patient care was coordinated with spoke provider, including but not limted to    Discussing likely diagnosis/etiology of symptoms  Making recommendations for further diagnostic studies  Making recommendations for intravenous thrombolytics or other advanced therapies  Making recommendations for disposition (admission/transfer for higher level of care)      Neurology consultation requested by spoke provider. Audiovisual encounter with the patient performed using a secure connection.  Results and impressions from the visit are documented on this note and were communicated to the consulting provider/team via direct communication. The note has been shared for addition to the patients electronic medical record.

## 2025-04-23 NOTE — ED NOTES
Pt. Brought from lobby with reports stroke like symptoms. Patient reports the following history of symptoms: she had just finished working out and was driving home approx. 30 minutes ago. She suddenly felt like she could not swallow and drove home to call her  to bring her to the hospital. She began to have numbness/tingling to the (R) side of her face that radiated down her lateral ribs. She reports this sensation of numbness/tingling sometimes starts on one side of her body and travels around to the other side-from her head down to her feet and across and up the other side of her body. Previous episode was followed by a sudden onset of severe headache and vomiting. Symptoms previously resolved after vomiting. Today she reports only mild headache and no nausea or vomiting. She is speaking and swallowing without difficulty presently. She c/o only numbness/tingling to the (R) lateral ribs area. Denies cp,sob. She is visibly, mildly anxious.

## 2025-04-23 NOTE — ED NOTES
En route back from ct scan pt. Reports the numbness/tingling has moved from (R) side to (L) side of face/head.

## 2025-04-23 NOTE — CONSULTS
[facility]  Consult Note    Date of Service: 4/23/2025  Patient seen at the request of: Self, Aaareferral    Reason for Consultation  Transient neurological symptoms.    Assessment:  Desire Kilpatrick is a 44 y.o. female who presents with sudden onset of language impairment, difficulty swallowing and alternate in right sided then left-sided numbness, code stroke called, NIH 0 .  CT head with no acute finding.  Patient back to baseline.     Patient reported feeling headache followed by tingling in her R face down the neck to the trunk then switched to the Left side. Reported hx of headache with numbness before similar to this one , frequency -every other year once.     Low suspicion for ischemic stroke, likely headache/migraine related   Neuro exam is unremarkable.     Plan:  - No headache now, try headache cocktail if needed   - No indications for MRI brain at this time   - No further test/medications from stroke stand point         Signed:    Caty Reich MD  Neurology/Vascular neurology   04/23/2025 2:33 PM      HPI:  Desire Kilpatrick is a 44 y.o. female with   Past Medical History:   Diagnosis Date    Amenorrhea     Dyspareunia     Fibroid     Migraine headache     with aura    Mitral valve prolapse 2012    PONV (postoperative nausea and vomiting)      With past medical history of HTN, depression and anxiety, presented to the [facility] with acute onset of swallowing difficulty, language impairment, right-sided numbness that moved to the left side while she was driving.  Code stroke called, NIH 0, CT head reported no acute abnormality.  MRI brain recommended to rule out stroke.         This is the extent of the patient's complaints at this time.    TSH   Date Value Ref Range Status   03/27/2025 1.007 0.400 - 4.000 uIU/mL Final   02/25/2025 0.057 (L) 0.400 - 4.000 uIU/mL Final   03/15/2023 0.586 0.400 - 4.000 uIU/mL Final     Vitamin B-12   Date Value Ref Range Status   03/15/2023 458 210 - 950 pg/mL  Final         Review of Systems:  ROS negative unless noted in HPI    Past Surgical History:  Past Surgical History:   Procedure Laterality Date    ARTHROSCOPIC CHONDROPLASTY OF KNEE JOINT Left 08/24/2018    Procedure: ARTHROSCOPY, KNEE, WITH CHONDROPLASTY;  Surgeon: Konrad Mckeon MD;  Location: [facility];  Service: Orthopedics;  Laterality: Left;    ARTHROSCOPY OF KNEE Left 08/24/2018    Procedure: ARTHROSCOPY, KNEE;  Surgeon: Konrad Mckeon MD;  Location: [facility] OR;  Service: Orthopedics;  Laterality: Left;    Breast Reduction      BREAST SURGERY  2010    Breast Reduction    COLONOSCOPY N/A 06/01/2022    Procedure: COLONOSCOPY;  Surgeon: Didier Contreras MD;  Location: [facility] ENDO ([address] [place] [address]);  Service: Endoscopy;  Laterality: N/A;  fully vaccinated-GT    COSMETIC SURGERY  Tummy davidck    CYSTOSCOPY N/A 06/23/2021    Procedure: CYSTOSCOPY;  Surgeon: Puja Hearn MD;  Location: [facility];  Service: OB/GYN;  Laterality: N/A;    HYSTERECTOMY      6/2020    LATERAL RETINACULA RELEASE OF KNEE Left 08/24/2018    Procedure: RELEASE, KNEE, LATERAL RETINACULA;  Surgeon: Konrad Mckeon MD;  Location: [facility];  Service: Orthopedics;  Laterality: Left;    ROBOT-ASSISTED LAPAROSCOPIC HYSTERECTOMY N/A 06/23/2021    Procedure: ROBOTIC HYSTERECTOMY;  Surgeon: Puja Hearn MD;  Location: [facility];  Service: OB/GYN;  Laterality: N/A;    SALPINGECTOMY Bilateral 06/23/2021    Procedure: SALPINGECTOMY;  Surgeon: Puja Hearn MD;  Location: [facility] OR;  Service: OB/GYN;  Laterality: Bilateral;    TOTAL REDUCTION MAMMOPLASTY      2010    TUBAL LIGATION      tummy tuck         Family History:  Family History   Problem Relation Name Age of Onset    Breast cancer Mother Jana Henderson 68    Hypertension Mother Jana Henderson     Hypertension Brother      Lupus Maternal Grandmother Talya Herron     Rheum arthritis Maternal Grandmother Talya Herron     Cancer Paternal Grandmother      Breast cancer Maternal Aunt Urpinder East         Great aunt     "Stomach cancer Maternal Aunt Rupinder Carrillo     Breast cancer Maternal Aunt          great aunt    Breast cancer Maternal Aunt          great aunt    Alcohol abuse Father Olegario Tamez     Drug abuse Father Olegario Tamez     Alcohol abuse Brother Konrad Price        Social History:  Social History[1]    Allergies:  Valsartan and Hydrochlorothiazide    Outpatient Medications:  Prior to Admission medications    Medication Sig Start Date End Date Taking? Authorizing Provider   NIFEdipine (PROCARDIA-XL) 60 MG (OSM) 24 hr tablet Take 1 tablet (60 mg total) by mouth nightly. 4/9/25 4/9/26  Kayla Barraza MD       Physical exam:    Vitals: /73   Pulse 76   Temp 98.9 °F (37.2 °C) (Oral)   Resp 19   Ht 5' 9" (1.753 m)   Wt 83.9 kg (185 lb)   LMP 06/07/2021 (Approximate)   SpO2 100%   BMI 27.32 kg/m²     Neurological Exam  Mental Status:  Alert and oriented to person, place and time, recent and remote memory intact, normal attention span and fund of knowledge; Speech is spontaneous and fluent     Cranial Nerve exam:  II: Visual fields full to confrontation; Pupils equal round and reactive about 3mm  III, IV, VI: Extraoccular movements intact with no nystagmus  V: Sensation in V1, V2, V3 intact to light-touch bilaterally,  VII:  No facial weakness,  VIII: Hearing grossly intact  IX,X: palate elevation symmetric   XI: SCM & Trapezius normal,  XII: tongue midline, normal morphology, tongue movement normal     Motor Exam: No involuntary movement. Normal tone and bulk in all 4 extremities  Strength: 5/5 throughout   Reflexes: 2+ throughout    Sensory Exam: Intact touch, pain and vibration in all 4 limbs    Cerebellar Sign: Normal Finger-to-nose,  bilaterally.  Gait:  lay in bed     Imaging:  All pertinent imaging was personally reviewed.                [1]   Social History  Tobacco Use    Smoking status: Never    Smokeless tobacco: Never   Substance Use Topics    Alcohol use: Yes     Alcohol/week: 6.0 standard " drinks of alcohol     Types: 3 Glasses of wine, 3 Drinks containing 0.5 oz of alcohol per week     Comment: occasional    Drug use: Not Currently     Types: Marijuana     Comment: During weekends 1-2/week. Quit 3 years ago.

## 2025-04-29 LAB
OHS QRS DURATION: 86 MS
OHS QTC CALCULATION: 453 MS

## 2025-05-30 ENCOUNTER — TELEPHONE (OUTPATIENT)
Dept: SURGERY | Facility: CLINIC | Age: 45
End: 2025-05-30
Payer: COMMERCIAL

## 2025-06-06 ENCOUNTER — OFFICE VISIT (OUTPATIENT)
Dept: NEUROLOGY | Facility: CLINIC | Age: 45
End: 2025-06-06
Payer: COMMERCIAL

## 2025-06-06 VITALS
SYSTOLIC BLOOD PRESSURE: 124 MMHG | HEART RATE: 71 BPM | WEIGHT: 189.63 LBS | BODY MASS INDEX: 28.08 KG/M2 | HEIGHT: 69 IN | DIASTOLIC BLOOD PRESSURE: 73 MMHG

## 2025-06-06 DIAGNOSIS — G43.109 BASILAR MIGRAINE: Primary | ICD-10-CM

## 2025-06-06 DIAGNOSIS — G43.119 INTRACTABLE MIGRAINE WITH AURA WITHOUT STATUS MIGRAINOSUS: ICD-10-CM

## 2025-06-06 DIAGNOSIS — R20.0 NUMBNESS: ICD-10-CM

## 2025-06-06 PROCEDURE — 99999 PR PBB SHADOW E&M-EST. PATIENT-LVL III: CPT | Mod: PBBFAC,,, | Performed by: STUDENT IN AN ORGANIZED HEALTH CARE EDUCATION/TRAINING PROGRAM

## 2025-06-06 RX ORDER — UBROGEPANT 100 MG/1
100 TABLET ORAL
Qty: 10 TABLET | Refills: 11 | Status: SHIPPED | OUTPATIENT
Start: 2025-06-06

## 2025-06-06 RX ORDER — AMLODIPINE BESYLATE 10 MG/1
10 TABLET ORAL DAILY
COMMUNITY

## 2025-06-07 ENCOUNTER — PATIENT MESSAGE (OUTPATIENT)
Dept: ADMINISTRATIVE | Facility: OTHER | Age: 45
End: 2025-06-07
Payer: COMMERCIAL

## 2025-06-09 ENCOUNTER — PATIENT MESSAGE (OUTPATIENT)
Dept: PRIMARY CARE CLINIC | Facility: CLINIC | Age: 45
End: 2025-06-09
Payer: COMMERCIAL

## 2025-06-09 ENCOUNTER — PATIENT MESSAGE (OUTPATIENT)
Dept: NEUROLOGY | Facility: CLINIC | Age: 45
End: 2025-06-09
Payer: COMMERCIAL

## 2025-06-09 DIAGNOSIS — I10 PRIMARY HYPERTENSION: Primary | ICD-10-CM

## 2025-06-09 NOTE — TELEPHONE ENCOUNTER
No care due was identified.  [facility] Embedded Care Due Messages. Reference number: [id number].   6/09/2025 3:59:18 PM CDT

## 2025-06-09 NOTE — TELEPHONE ENCOUNTER
" LOV with Kayla Barraza MD , 3/27/2025 (Annual)  Pt reports did not take nifedipine. Pt "feels" her BP "has been good," and would like to continue amlodipine. She has two tablets left.    Refill amlodipine pended, if appropriate.      "

## 2025-06-10 RX ORDER — AMLODIPINE BESYLATE 10 MG/1
10 TABLET ORAL DAILY
Qty: 30 TABLET | Refills: 11 | Status: SHIPPED | OUTPATIENT
Start: 2025-06-10

## 2025-07-11 ENCOUNTER — TELEPHONE (OUTPATIENT)
Dept: SURGERY | Facility: CLINIC | Age: 45
End: 2025-07-11
Payer: COMMERCIAL

## 2025-07-14 ENCOUNTER — TELEPHONE (OUTPATIENT)
Dept: SURGERY | Facility: CLINIC | Age: 45
End: 2025-07-14

## 2025-07-14 NOTE — TELEPHONE ENCOUNTER
Attempted to contact pt regarding appt today with Dr. Velazquez. No answer. Unable to leave voicemail to discuss further.

## (undated) DEVICE — TRAY FOLEY 16FR INFECTION CONT

## (undated) DEVICE — SEE MEDLINE ITEM 157181

## (undated) DEVICE — NDL INSUF ULTRA VERESS 120MM

## (undated) DEVICE — DRAPE LAVH LAPAROSCOPY W/FLUID

## (undated) DEVICE — GAUZE SPONGE 4X4 12PLY

## (undated) DEVICE — JELLY LUBRICANT STERILE 4 OZ

## (undated) DEVICE — SEAL UNIVERSAL 5MM-8MM XI

## (undated) DEVICE — BLADE GATOR 3.5 6 EACH/BOX

## (undated) DEVICE — DRAPE STERI U-SHAPED 47X51IN

## (undated) DEVICE — PAD COLD THERAPY KNEE WRAP ON

## (undated) DEVICE — DRAPE ARM DAVINCI XI

## (undated) DEVICE — UNDERWEAR ADULT LARGE PULL ON

## (undated) DEVICE — BELLOW CANN HEMOBLAST 1.65GR

## (undated) DEVICE — SEE MEDLINE ITEM 157131

## (undated) DEVICE — DRESSING XEROFORM FOIL PK 1X8

## (undated) DEVICE — Device

## (undated) DEVICE — GLOVE BIOGEL SKINSENSE PI 7.5

## (undated) DEVICE — ADHESIVE DERMABOND ADVANCED

## (undated) DEVICE — KIT WING PAD POSITIONING

## (undated) DEVICE — GLOVE BIOGEL SKINSENSE PI 8.0

## (undated) DEVICE — PAD CAST SPECIALIST STRL 6

## (undated) DEVICE — PANTIES FEMININE NAPKIN LG/XLG

## (undated) DEVICE — SEE MEDLINE ITEM 146308

## (undated) DEVICE — SUT 0 VICRYL / UR6 (J603)

## (undated) DEVICE — SOL IRR STRL WATER 500ML

## (undated) DEVICE — CONTAINER PATHOLOGY W/LID 32OZ

## (undated) DEVICE — KIT MENISCECTOMY ELCTRD

## (undated) DEVICE — SEE MEDLINE ITEM 146298

## (undated) DEVICE — GLOVE BIOGEL SKINSENSE PI 7.0

## (undated) DEVICE — SOL 9P NACL IRR PIC IL

## (undated) DEVICE — SEE MEDLINE ITEM 157117

## (undated) DEVICE — SEE MEDLINE ITEM 154981

## (undated) DEVICE — SEE MEDLINE ITEM 156952

## (undated) DEVICE — SYR 10CC LUER LOCK

## (undated) DEVICE — OCCLUDER COLPO-PNEUMO STERILE

## (undated) DEVICE — NDL HYPO REG 25G X 1 1/2

## (undated) DEVICE — SEE MEDLINE ITEM 146355

## (undated) DEVICE — SEE MEDLINE ITEM 152523

## (undated) DEVICE — UNDERGLOVES BIOGEL PI SZ 7 LF

## (undated) DEVICE — CLOSURE SKIN STERI STRIP 1/2X4

## (undated) DEVICE — APPLICATOR CHLORAPREP ORN 26ML

## (undated) DEVICE — SOL NACL IRR 3000ML

## (undated) DEVICE — ELECTRODE REM PLYHSV RETURN 9

## (undated) DEVICE — SEE MEDLINE ITEM 146231

## (undated) DEVICE — PAD PREP 50/CA

## (undated) DEVICE — SUT PROLENE 3-0 FS-1 MONO18

## (undated) DEVICE — SUT 0 VICRYL / CT-1

## (undated) DEVICE — ALCOHOL 70% ISOP W/GREEN 16OZ

## (undated) DEVICE — IRRIGATOR ENDOSCOPY DISP.

## (undated) DEVICE — TOURNIQUET SB QC SP 34X4IN

## (undated) DEVICE — TUBE SET INFLOW/OUTFLOW

## (undated) DEVICE — SYR 50CC LL

## (undated) DEVICE — OBTURATOR BLADELESS 8MM XI CLR

## (undated) DEVICE — SUT MCRYL PLUS 4-0 PS2 27IN

## (undated) DEVICE — COVER TIP CURVED SCISSORS XI

## (undated) DEVICE — GLOVE SURGICAL LATEX SZ 6.5

## (undated) DEVICE — KIT EVACUATOR 3-SPRING 1/8 DRN

## (undated) DEVICE — MANIPULATOR TIP RUMI ORANGE

## (undated) DEVICE — APPLICATOR HEMOBLAST LAPSCP